# Patient Record
Sex: MALE | Race: WHITE | NOT HISPANIC OR LATINO
[De-identification: names, ages, dates, MRNs, and addresses within clinical notes are randomized per-mention and may not be internally consistent; named-entity substitution may affect disease eponyms.]

---

## 2017-03-20 ENCOUNTER — APPOINTMENT (OUTPATIENT)
Dept: PULMONOLOGY | Facility: CLINIC | Age: 72
End: 2017-03-20

## 2017-03-20 VITALS
WEIGHT: 226 LBS | BODY MASS INDEX: 31.64 KG/M2 | SYSTOLIC BLOOD PRESSURE: 138 MMHG | HEART RATE: 98 BPM | DIASTOLIC BLOOD PRESSURE: 78 MMHG | OXYGEN SATURATION: 97 % | HEIGHT: 71 IN

## 2017-03-20 DIAGNOSIS — R05 COUGH: ICD-10-CM

## 2017-03-20 DIAGNOSIS — Z86.59 PERSONAL HISTORY OF OTHER MENTAL AND BEHAVIORAL DISORDERS: ICD-10-CM

## 2017-03-20 DIAGNOSIS — Z86.39 PERSONAL HISTORY OF OTHER ENDOCRINE, NUTRITIONAL AND METABOLIC DISEASE: ICD-10-CM

## 2017-03-20 DIAGNOSIS — F25.9 SCHIZOAFFECTIVE DISORDER, UNSPECIFIED: ICD-10-CM

## 2017-03-20 DIAGNOSIS — I25.10 ATHEROSCLEROTIC HEART DISEASE OF NATIVE CORONARY ARTERY W/OUT ANGINA PECTORIS: ICD-10-CM

## 2017-03-20 DIAGNOSIS — I10 ESSENTIAL (PRIMARY) HYPERTENSION: ICD-10-CM

## 2017-03-20 DIAGNOSIS — G47.33 OBSTRUCTIVE SLEEP APNEA (ADULT) (PEDIATRIC): ICD-10-CM

## 2017-05-11 PROBLEM — I10 HTN (HYPERTENSION), BENIGN: Status: RESOLVED | Noted: 2017-05-11 | Resolved: 2017-05-11

## 2017-05-11 PROBLEM — F25.9 SCHIZOAFFECTIVE DISORDER: Status: RESOLVED | Noted: 2017-05-11 | Resolved: 2017-05-11

## 2017-05-11 PROBLEM — G47.33 SLEEP APNEA, OBSTRUCTIVE: Status: RESOLVED | Noted: 2017-05-11 | Resolved: 2017-05-11

## 2017-05-11 PROBLEM — Z86.39 HISTORY OF TYPE 2 DIABETES MELLITUS: Status: RESOLVED | Noted: 2017-05-11 | Resolved: 2017-05-11

## 2017-05-11 PROBLEM — I25.10 CAD (CORONARY ARTERY DISEASE): Status: RESOLVED | Noted: 2017-05-11 | Resolved: 2017-05-11

## 2017-05-11 PROBLEM — Z86.59 HISTORY OF DEPRESSION: Status: RESOLVED | Noted: 2017-05-11 | Resolved: 2017-05-11

## 2017-05-11 PROBLEM — Z86.39 HISTORY OF HYPERCHOLESTEROLEMIA: Status: RESOLVED | Noted: 2017-05-11 | Resolved: 2017-05-11

## 2017-05-11 RX ORDER — AMLODIPINE BESYLATE 5 MG/1
5 TABLET ORAL
Qty: 30 | Refills: 0 | Status: ACTIVE | COMMUNITY
Start: 2016-10-17

## 2017-05-11 RX ORDER — LEVOFLOXACIN 500 MG/1
500 TABLET, FILM COATED ORAL
Qty: 10 | Refills: 0 | Status: DISCONTINUED | COMMUNITY
Start: 2017-01-20

## 2017-05-11 RX ORDER — PREDNISOLONE ACETATE 10 MG/ML
1 SUSPENSION/ DROPS OPHTHALMIC
Qty: 5 | Refills: 0 | Status: DISCONTINUED | COMMUNITY
Start: 2016-10-04

## 2017-05-11 RX ORDER — CARVEDILOL 25 MG/1
25 TABLET, FILM COATED ORAL
Qty: 60 | Refills: 0 | Status: ACTIVE | COMMUNITY
Start: 2016-10-05

## 2017-05-11 RX ORDER — LIDOCAINE 5 G/100G
5 OINTMENT TOPICAL
Qty: 50 | Refills: 0 | Status: COMPLETED | COMMUNITY
Start: 2017-02-18

## 2017-05-11 RX ORDER — AZITHROMYCIN 250 MG/1
250 TABLET, FILM COATED ORAL
Qty: 6 | Refills: 0 | Status: COMPLETED | COMMUNITY
Start: 2016-11-03

## 2017-05-11 RX ORDER — METFORMIN HYDROCHLORIDE 1000 MG/1
1000 TABLET, COATED ORAL
Qty: 60 | Refills: 0 | Status: ACTIVE | COMMUNITY
Start: 2016-10-17

## 2017-05-11 RX ORDER — MECLIZINE HYDROCHLORIDE 25 MG/1
25 TABLET ORAL
Qty: 90 | Refills: 0 | Status: DISCONTINUED | COMMUNITY
Start: 2016-10-24

## 2017-05-11 RX ORDER — SERTRALINE HYDROCHLORIDE 100 MG/1
100 TABLET, FILM COATED ORAL
Qty: 30 | Refills: 0 | Status: ACTIVE | COMMUNITY
Start: 2016-09-20

## 2017-05-11 RX ORDER — TRAMADOL HYDROCHLORIDE 50 MG/1
50 TABLET, COATED ORAL
Qty: 120 | Refills: 0 | Status: DISCONTINUED | COMMUNITY
Start: 2016-10-18

## 2017-06-26 ENCOUNTER — APPOINTMENT (OUTPATIENT)
Dept: PULMONOLOGY | Facility: CLINIC | Age: 72
End: 2017-06-26

## 2018-09-24 ENCOUNTER — APPOINTMENT (OUTPATIENT)
Dept: PULMONOLOGY | Facility: CLINIC | Age: 73
End: 2018-09-24
Payer: MEDICARE

## 2018-09-24 VITALS
HEART RATE: 84 BPM | SYSTOLIC BLOOD PRESSURE: 119 MMHG | RESPIRATION RATE: 18 BRPM | BODY MASS INDEX: 30.8 KG/M2 | HEIGHT: 71 IN | DIASTOLIC BLOOD PRESSURE: 70 MMHG | OXYGEN SATURATION: 96 % | WEIGHT: 220 LBS

## 2018-09-24 DIAGNOSIS — G47.30 SLEEP APNEA, UNSPECIFIED: ICD-10-CM

## 2018-09-24 DIAGNOSIS — R53.83 OTHER FATIGUE: ICD-10-CM

## 2018-09-24 PROCEDURE — 94727 GAS DIL/WSHOT DETER LNG VOL: CPT

## 2018-09-24 PROCEDURE — 94729 DIFFUSING CAPACITY: CPT

## 2018-09-24 PROCEDURE — 94060 EVALUATION OF WHEEZING: CPT

## 2018-09-24 PROCEDURE — 99214 OFFICE O/P EST MOD 30 MIN: CPT | Mod: 25

## 2018-09-24 RX ORDER — FLUTICASONE PROPIONATE AND SALMETEROL 50; 250 UG/1; UG/1
250-50 POWDER RESPIRATORY (INHALATION)
Qty: 3 | Refills: 3 | Status: DISCONTINUED | COMMUNITY
Start: 2017-03-20 | End: 2018-09-24

## 2018-09-24 RX ORDER — FLUOXETINE HYDROCHLORIDE 40 MG/1
40 CAPSULE ORAL
Qty: 90 | Refills: 0 | Status: ACTIVE | COMMUNITY
Start: 2018-06-29

## 2018-09-24 RX ORDER — ASPIRIN 81 MG
81 TABLET, DELAYED RELEASE (ENTERIC COATED) ORAL
Refills: 0 | Status: ACTIVE | COMMUNITY

## 2018-09-24 RX ORDER — METOPROLOL SUCCINATE 50 MG/1
50 TABLET, EXTENDED RELEASE ORAL
Qty: 30 | Refills: 0 | Status: ACTIVE | COMMUNITY
Start: 2018-04-18

## 2018-09-24 RX ORDER — VALSARTAN 160 MG/1
160 TABLET, COATED ORAL
Qty: 30 | Refills: 0 | Status: DISCONTINUED | COMMUNITY
Start: 2016-10-28 | End: 2018-09-24

## 2018-09-27 PROBLEM — G47.30 APNEA, SLEEP: Status: ACTIVE | Noted: 2018-09-27

## 2018-09-27 PROBLEM — R53.83 FATIGUE: Status: ACTIVE | Noted: 2018-09-27

## 2018-12-24 ENCOUNTER — APPOINTMENT (OUTPATIENT)
Dept: PULMONOLOGY | Facility: CLINIC | Age: 73
End: 2018-12-24

## 2019-09-27 ENCOUNTER — INPATIENT (INPATIENT)
Facility: HOSPITAL | Age: 74
LOS: 3 days | Discharge: HOME | DRG: 880 | End: 2019-10-01
Attending: STUDENT IN AN ORGANIZED HEALTH CARE EDUCATION/TRAINING PROGRAM | Admitting: STUDENT IN AN ORGANIZED HEALTH CARE EDUCATION/TRAINING PROGRAM
Payer: MEDICARE

## 2019-09-27 VITALS
HEIGHT: 73 IN | HEART RATE: 88 BPM | SYSTOLIC BLOOD PRESSURE: 176 MMHG | OXYGEN SATURATION: 100 % | WEIGHT: 199.96 LBS | RESPIRATION RATE: 16 BRPM | DIASTOLIC BLOOD PRESSURE: 101 MMHG | TEMPERATURE: 98 F

## 2019-09-27 DIAGNOSIS — I25.10 ATHEROSCLEROTIC HEART DISEASE OF NATIVE CORONARY ARTERY WITHOUT ANGINA PECTORIS: ICD-10-CM

## 2019-09-27 DIAGNOSIS — R46.89 OTHER SYMPTOMS AND SIGNS INVOLVING APPEARANCE AND BEHAVIOR: ICD-10-CM

## 2019-09-27 DIAGNOSIS — R63.8 OTHER SYMPTOMS AND SIGNS CONCERNING FOOD AND FLUID INTAKE: ICD-10-CM

## 2019-09-27 DIAGNOSIS — R53.1 WEAKNESS: ICD-10-CM

## 2019-09-27 DIAGNOSIS — G62.9 POLYNEUROPATHY, UNSPECIFIED: ICD-10-CM

## 2019-09-27 DIAGNOSIS — I10 ESSENTIAL (PRIMARY) HYPERTENSION: ICD-10-CM

## 2019-09-27 DIAGNOSIS — C91.90 LYMPHOID LEUKEMIA, UNSPECIFIED NOT HAVING ACHIEVED REMISSION: ICD-10-CM

## 2019-09-27 DIAGNOSIS — E78.5 HYPERLIPIDEMIA, UNSPECIFIED: ICD-10-CM

## 2019-09-27 DIAGNOSIS — Z95.1 PRESENCE OF AORTOCORONARY BYPASS GRAFT: Chronic | ICD-10-CM

## 2019-09-27 DIAGNOSIS — Z91.89 OTHER SPECIFIED PERSONAL RISK FACTORS, NOT ELSEWHERE CLASSIFIED: ICD-10-CM

## 2019-09-27 DIAGNOSIS — E11.9 TYPE 2 DIABETES MELLITUS WITHOUT COMPLICATIONS: ICD-10-CM

## 2019-09-27 LAB
ALBUMIN SERPL ELPH-MCNC: 4.1 G/DL — SIGNIFICANT CHANGE UP (ref 3.3–5)
ALP SERPL-CCNC: 93 U/L — SIGNIFICANT CHANGE UP (ref 40–120)
ALT FLD-CCNC: 12 U/L — SIGNIFICANT CHANGE UP (ref 10–45)
ANION GAP SERPL CALC-SCNC: 11 MMOL/L — SIGNIFICANT CHANGE UP (ref 5–17)
ANISOCYTOSIS BLD QL: SIGNIFICANT CHANGE UP
AST SERPL-CCNC: 14 U/L — SIGNIFICANT CHANGE UP (ref 10–40)
BASOPHILS # BLD AUTO: 0.08 K/UL — SIGNIFICANT CHANGE UP (ref 0–0.2)
BASOPHILS NFR BLD AUTO: 0.7 % — SIGNIFICANT CHANGE UP (ref 0–2)
BILIRUB SERPL-MCNC: 0.7 MG/DL — SIGNIFICANT CHANGE UP (ref 0.2–1.2)
BUN SERPL-MCNC: 12 MG/DL — SIGNIFICANT CHANGE UP (ref 7–23)
CALCIUM SERPL-MCNC: 9.3 MG/DL — SIGNIFICANT CHANGE UP (ref 8.4–10.5)
CHLORIDE SERPL-SCNC: 105 MMOL/L — SIGNIFICANT CHANGE UP (ref 96–108)
CO2 SERPL-SCNC: 25 MMOL/L — SIGNIFICANT CHANGE UP (ref 22–31)
CREAT SERPL-MCNC: 0.67 MG/DL — SIGNIFICANT CHANGE UP (ref 0.5–1.3)
EOSINOPHIL # BLD AUTO: 0.1 K/UL — SIGNIFICANT CHANGE UP (ref 0–0.5)
EOSINOPHIL NFR BLD AUTO: 0.9 % — SIGNIFICANT CHANGE UP (ref 0–6)
ETHANOL SERPL-MCNC: <10 MG/DL — SIGNIFICANT CHANGE UP (ref 0–10)
GLUCOSE SERPL-MCNC: 213 MG/DL — HIGH (ref 70–99)
HCT VFR BLD CALC: 48.3 % — SIGNIFICANT CHANGE UP (ref 39–50)
HGB BLD-MCNC: 15 G/DL — SIGNIFICANT CHANGE UP (ref 13–17)
IMM GRANULOCYTES NFR BLD AUTO: 0.6 % — SIGNIFICANT CHANGE UP (ref 0–1.5)
LG PLATELETS BLD QL AUTO: SLIGHT — SIGNIFICANT CHANGE UP
LYMPHOCYTES # BLD AUTO: 2.16 K/UL — SIGNIFICANT CHANGE UP (ref 1–3.3)
LYMPHOCYTES # BLD AUTO: 20 % — SIGNIFICANT CHANGE UP (ref 13–44)
MACROCYTES BLD QL: SLIGHT — SIGNIFICANT CHANGE UP
MANUAL SMEAR VERIFICATION: SIGNIFICANT CHANGE UP
MCHC RBC-ENTMCNC: 24.8 PG — LOW (ref 27–34)
MCHC RBC-ENTMCNC: 31.1 GM/DL — LOW (ref 32–36)
MCV RBC AUTO: 80 FL — SIGNIFICANT CHANGE UP (ref 80–100)
MICROCYTES BLD QL: SIGNIFICANT CHANGE UP
MONOCYTES # BLD AUTO: 0.75 K/UL — SIGNIFICANT CHANGE UP (ref 0–0.9)
MONOCYTES NFR BLD AUTO: 6.9 % — SIGNIFICANT CHANGE UP (ref 2–14)
NEUTROPHILS # BLD AUTO: 7.64 K/UL — HIGH (ref 1.8–7.4)
NEUTROPHILS NFR BLD AUTO: 70.9 % — SIGNIFICANT CHANGE UP (ref 43–77)
NRBC # BLD: 0 /100 WBCS — SIGNIFICANT CHANGE UP (ref 0–0)
OVALOCYTES BLD QL SMEAR: SLIGHT — SIGNIFICANT CHANGE UP
PLAT MORPH BLD: ABNORMAL
PLATELET # BLD AUTO: 228 K/UL — SIGNIFICANT CHANGE UP (ref 150–400)
POIKILOCYTOSIS BLD QL AUTO: SLIGHT — SIGNIFICANT CHANGE UP
POLYCHROMASIA BLD QL SMEAR: SLIGHT — SIGNIFICANT CHANGE UP
POTASSIUM SERPL-MCNC: 4.1 MMOL/L — SIGNIFICANT CHANGE UP (ref 3.5–5.3)
POTASSIUM SERPL-SCNC: 4.1 MMOL/L — SIGNIFICANT CHANGE UP (ref 3.5–5.3)
PROT SERPL-MCNC: 7.3 G/DL — SIGNIFICANT CHANGE UP (ref 6–8.3)
RBC # BLD: 6.04 M/UL — HIGH (ref 4.2–5.8)
RBC # FLD: 22.1 % — HIGH (ref 10.3–14.5)
RBC BLD AUTO: ABNORMAL
SODIUM SERPL-SCNC: 141 MMOL/L — SIGNIFICANT CHANGE UP (ref 135–145)
SPHEROCYTES BLD QL SMEAR: SLIGHT — SIGNIFICANT CHANGE UP
TSH SERPL-MCNC: 1.25 UIU/ML — SIGNIFICANT CHANGE UP (ref 0.35–4.94)
WBC # BLD: 10.8 K/UL — HIGH (ref 3.8–10.5)
WBC # FLD AUTO: 10.8 K/UL — HIGH (ref 3.8–10.5)

## 2019-09-27 PROCEDURE — 99222 1ST HOSP IP/OBS MODERATE 55: CPT | Mod: GC

## 2019-09-27 PROCEDURE — 93010 ELECTROCARDIOGRAM REPORT: CPT

## 2019-09-27 PROCEDURE — 90792 PSYCH DIAG EVAL W/MED SRVCS: CPT

## 2019-09-27 PROCEDURE — 99285 EMERGENCY DEPT VISIT HI MDM: CPT

## 2019-09-27 RX ORDER — DEXTROSE 50 % IN WATER 50 %
25 SYRINGE (ML) INTRAVENOUS ONCE
Refills: 0 | Status: DISCONTINUED | OUTPATIENT
Start: 2019-09-27 | End: 2019-10-01

## 2019-09-27 RX ORDER — INSULIN LISPRO 100/ML
VIAL (ML) SUBCUTANEOUS
Refills: 0 | Status: DISCONTINUED | OUTPATIENT
Start: 2019-09-27 | End: 2019-10-01

## 2019-09-27 RX ORDER — ASPIRIN/CALCIUM CARB/MAGNESIUM 324 MG
81 TABLET ORAL DAILY
Refills: 0 | Status: DISCONTINUED | OUTPATIENT
Start: 2019-09-27 | End: 2019-10-01

## 2019-09-27 RX ORDER — GLUCAGON INJECTION, SOLUTION 0.5 MG/.1ML
1 INJECTION, SOLUTION SUBCUTANEOUS ONCE
Refills: 0 | Status: DISCONTINUED | OUTPATIENT
Start: 2019-09-27 | End: 2019-10-01

## 2019-09-27 RX ORDER — METOPROLOL TARTRATE 50 MG
100 TABLET ORAL DAILY
Refills: 0 | Status: DISCONTINUED | OUTPATIENT
Start: 2019-09-28 | End: 2019-10-01

## 2019-09-27 RX ORDER — CLOPIDOGREL BISULFATE 75 MG/1
75 TABLET, FILM COATED ORAL DAILY
Refills: 0 | Status: DISCONTINUED | OUTPATIENT
Start: 2019-09-27 | End: 2019-10-01

## 2019-09-27 RX ORDER — DEXTROSE 50 % IN WATER 50 %
12.5 SYRINGE (ML) INTRAVENOUS ONCE
Refills: 0 | Status: DISCONTINUED | OUTPATIENT
Start: 2019-09-27 | End: 2019-10-01

## 2019-09-27 RX ORDER — ATORVASTATIN CALCIUM 80 MG/1
80 TABLET, FILM COATED ORAL AT BEDTIME
Refills: 0 | Status: DISCONTINUED | OUTPATIENT
Start: 2019-09-27 | End: 2019-10-01

## 2019-09-27 RX ORDER — AMLODIPINE BESYLATE 2.5 MG/1
5 TABLET ORAL DAILY
Refills: 0 | Status: DISCONTINUED | OUTPATIENT
Start: 2019-09-27 | End: 2019-10-01

## 2019-09-27 RX ORDER — QUETIAPINE FUMARATE 200 MG/1
25 TABLET, FILM COATED ORAL ONCE
Refills: 0 | Status: COMPLETED | OUTPATIENT
Start: 2019-09-27 | End: 2019-09-27

## 2019-09-27 RX ORDER — SODIUM CHLORIDE 9 MG/ML
1000 INJECTION, SOLUTION INTRAVENOUS
Refills: 0 | Status: DISCONTINUED | OUTPATIENT
Start: 2019-09-27 | End: 2019-10-01

## 2019-09-27 RX ORDER — DEXTROSE 50 % IN WATER 50 %
15 SYRINGE (ML) INTRAVENOUS ONCE
Refills: 0 | Status: DISCONTINUED | OUTPATIENT
Start: 2019-09-27 | End: 2019-10-01

## 2019-09-27 RX ORDER — QUETIAPINE FUMARATE 200 MG/1
50 TABLET, FILM COATED ORAL DAILY
Refills: 0 | Status: DISCONTINUED | OUTPATIENT
Start: 2019-09-28 | End: 2019-09-28

## 2019-09-27 RX ORDER — QUETIAPINE FUMARATE 200 MG/1
50 TABLET, FILM COATED ORAL ONCE
Refills: 0 | Status: COMPLETED | OUTPATIENT
Start: 2019-09-27 | End: 2019-09-27

## 2019-09-27 RX ORDER — GABAPENTIN 400 MG/1
600 CAPSULE ORAL THREE TIMES A DAY
Refills: 0 | Status: DISCONTINUED | OUTPATIENT
Start: 2019-09-27 | End: 2019-10-01

## 2019-09-27 RX ORDER — ACETAMINOPHEN 500 MG
650 TABLET ORAL ONCE
Refills: 0 | Status: COMPLETED | OUTPATIENT
Start: 2019-09-27 | End: 2019-09-27

## 2019-09-27 RX ADMIN — GABAPENTIN 600 MILLIGRAM(S): 400 CAPSULE ORAL at 22:53

## 2019-09-27 RX ADMIN — QUETIAPINE FUMARATE 25 MILLIGRAM(S): 200 TABLET, FILM COATED ORAL at 20:41

## 2019-09-27 RX ADMIN — Medication 650 MILLIGRAM(S): at 11:45

## 2019-09-27 RX ADMIN — Medication 4: at 22:52

## 2019-09-27 RX ADMIN — Medication 650 MILLIGRAM(S): at 13:08

## 2019-09-27 RX ADMIN — QUETIAPINE FUMARATE 50 MILLIGRAM(S): 200 TABLET, FILM COATED ORAL at 11:45

## 2019-09-27 RX ADMIN — Medication 81 MILLIGRAM(S): at 17:52

## 2019-09-27 RX ADMIN — CLOPIDOGREL BISULFATE 75 MILLIGRAM(S): 75 TABLET, FILM COATED ORAL at 17:53

## 2019-09-27 RX ADMIN — ATORVASTATIN CALCIUM 80 MILLIGRAM(S): 80 TABLET, FILM COATED ORAL at 22:53

## 2019-09-27 NOTE — ED ADULT TRIAGE NOTE - CHIEF COMPLAINT QUOTE
Patient presents to the ED with c/o SI, states he will miss chemo therapy on purpose and has already "stopped eating." Also states " I live alone, no one takes care of me, I need to go to a nursing home, because I am going to die." Speaking in complete sentences without difficulty. NAD Noted

## 2019-09-27 NOTE — PHYSICAL THERAPY INITIAL EVALUATION ADULT - FOLLOWS COMMANDS/ANSWERS QUESTIONS, REHAB EVAL
able to follow single-step instructions/100% of the time/patient has difficulty with multi step commands, requiring VCing for safety/sequencing and repetition for task

## 2019-09-27 NOTE — OCCUPATIONAL THERAPY INITIAL EVALUATION ADULT - LEVEL OF INDEPENDENCE: DRESS LOWER BODY, OT EVAL
maximum assist (25% patients effort)/seated on EOB to attempt to doff socks, patient noted throwing self back to bed, however able to self correct balance. Patient catastrophizing, stating he just cannot do LB dressing.

## 2019-09-27 NOTE — H&P ADULT - NSHPLABSRESULTS_GEN_ALL_CORE
OVERNIGHT EVENTS:    SUBJECTIVE / INTERVAL HPI: Patient seen and examined at bedside.     VITAL SIGNS:  Vital Signs Last 24 Hrs  T(C): 36.6 (27 Sep 2019 12:47), Max: 36.8 (27 Sep 2019 08:30)  T(F): 97.9 (27 Sep 2019 12:47), Max: 98.2 (27 Sep 2019 08:30)  HR: 80 (27 Sep 2019 12:47) (79 - 88)  BP: 179/85 (27 Sep 2019 12:47) (133/77 - 179/85)  BP(mean): --  RR: 18 (27 Sep 2019 12:47) (16 - 18)  SpO2: 98% (27 Sep 2019 12:47) (97% - 100%)    LABS:                        15.0   10.80 )-----------( 228      ( 27 Sep 2019 08:16 )             48.3     09-27    141  |  105  |  12  ----------------------------<  213<H>  4.1   |  25  |  0.67    Ca    9.3      27 Sep 2019 08:16    TPro  7.3  /  Alb  4.1  /  TBili  0.7  /  DBili  x   /  AST  14  /  ALT  12  /  AlkPhos  93  09-27        CAPILLARY BLOOD GLUCOSE          RADIOLOGY & ADDITIONAL TESTS: Reviewed.

## 2019-09-27 NOTE — ED ADULT NURSE NOTE - OBJECTIVE STATEMENT
Pt states he does not want to go back where he lives. He lies in bed and does not eat, or takes his medications. Pt states he has been having suicidal thoughts with no specific plan for "couple months now." Hearing voices, forgot what voices told him. No HI. Pt states "I want to be with people that I can interact with."

## 2019-09-27 NOTE — PHYSICAL THERAPY INITIAL EVALUATION ADULT - LEVEL OF INDEPENDENCE: GAIT, REHAB EVAL
minx 1 with SC 5 ft bedside with ncrease swaying and other UE in mid guard position ready seeking for support. Amb 30 ft with RW CG-Min x 1.

## 2019-09-27 NOTE — PHYSICAL THERAPY INITIAL EVALUATION ADULT - ADDITIONAL COMMENTS
Pt lives at home alone in what appears t be a senior living type of facility though SUZIE Rios and CM Artie aware of patient report. Pt has elevator access, denies LICHA. PTA: pt states he has had weight loss (unclear #lbs) 2/2 unable to care for himself and has no help with meal prep. Pt reports amb with SC though had multiple falls (reports 3 falls though unclear time frames). States he did not want to use a RW previously. Pt reprots wearing eyeglasses corrective. States has neuropathy bilat LE below knee and finger tips

## 2019-09-27 NOTE — ED BEHAVIORAL HEALTH ASSESSMENT NOTE - PAST PSYCHOTROPIC MEDICATION
As of 2016 note pt was on   Ativan 0.5 mg po prn anxiety  Abilify 30 mg po qdaily  Trilafon 4 mg po qAM (Of note: Pt has been on higher doses in the past i.e. 4 mg po qdaily and 8 mg po qhs.)

## 2019-09-27 NOTE — H&P ADULT - PROBLEM SELECTOR PLAN 3
- Pt w/ CLL reportedly dx in 2012. Pt states he follows with Dr. Gallo, oncologist who started in him on Rituxan last week.   - Spoke with Dr. Woodson who is covering for Dr. Gallo who reports that pt is due for his next infusion of Rituxan on 10/1 and does not require inpatient therapy. Also, notes that there were plans to start pt on ibrutinib 420 mg but it is not clear if pt has started it yet

## 2019-09-27 NOTE — H&P ADULT - NSICDXPASTMEDICALHX_GEN_ALL_CORE_FT
PAST MEDICAL HISTORY:  Chronic leukemia in remission     DM (diabetes mellitus) Type 2    Hypertension     Sleep apnea, unspecified type

## 2019-09-27 NOTE — OCCUPATIONAL THERAPY INITIAL EVALUATION ADULT - BATHING, PREVIOUS LEVEL OF FUNCTION, OT EVAL
Called patient and spoke with patient wife whom is on HIPPA and informed she verbalized understanding shower chair/needs device

## 2019-09-27 NOTE — PHYSICAL THERAPY INITIAL EVALUATION ADULT - IMPAIRMENTS CONTRIBUTING TO GAIT DEVIATIONS, PT EVAL
decreased sensation/+fall risk, pt requires assist with initiating task./decreased strength/impaired balance/cognition

## 2019-09-27 NOTE — OCCUPATIONAL THERAPY INITIAL EVALUATION ADULT - GENERAL OBSERVATIONS, REHAB EVAL
R hand dominant, patient cleared for OT by SATNAM Mcnair. Patient received semi-supine, +1:1 supervision, +heplock.

## 2019-09-27 NOTE — PHYSICAL THERAPY INITIAL EVALUATION ADULT - GENERAL OBSERVATIONS, REHAB EVAL
Rec'd pt supine in bed, in no acute distress, though expressed feeling anxious and that is why he is talk loud, pt denies POOJA REID and Blanca LARSEN/MARGIE lara

## 2019-09-27 NOTE — ED BEHAVIORAL HEALTH ASSESSMENT NOTE - OTHER PAST PSYCHIATRIC HISTORY (INCLUDE DETAILS REGARDING ONSET, COURSE OF ILLNESS, INPATIENT/OUTPATIENT TREATMENT)
Multiple prior inpt psychiatric admissions since he was in his 20's.  Last inpt admits ([facility]): 11/18/13-12/3/13; 10/30/14-11/6/14.  Last outpt tx/rx: no recent treatment   No hx of SI/SA, HI/HA, drugs/EtOH or cigarettes.

## 2019-09-27 NOTE — OCCUPATIONAL THERAPY INITIAL EVALUATION ADULT - MD ORDER
Per chart, Per chart, 75 y/o M w/ pmhx of CLL (receiving rituxan started about 1 week ago next tx due 10/1), schizoaffective disorder?, HTN, DM, CAD s/p CABG in 2004 and 2 stents placed 6 months ago, HLD, peripheral neuropathy presenting with complaints feelings of increased depression and states he is unable to take care of himself anymore. Pt reports he has been feeling lonely, not wanting to get out of bed and has had decreased appetite stating he has lost 20 lbs.

## 2019-09-27 NOTE — PHYSICAL THERAPY INITIAL EVALUATION ADULT - MODALITIES TREATMENT COMMENTS
smile symmetrical, eyes open/close intact and raise eyebrows intact. Vision testing deferred by pt. smile symmetrical, eyes open/close intact and raise eyebrows intact. Vision testing deferred by pt. LTG (4-7 days): 1) increase bed mobility to CG, 2) increase transfers to CG. 3) increase amb with approp AD 50 ft with CG.

## 2019-09-27 NOTE — ED BEHAVIORAL HEALTH ASSESSMENT NOTE - OTHER
focused on inability to care for himself. pt does not appear internally preoccupied. Possibly over endorsing symptoms to gain admission No current outpt psychiatric provider unable to assess On stretcher somewhat childlike Dramatic

## 2019-09-27 NOTE — H&P ADULT - HISTORY OF PRESENT ILLNESS
Pt is a 75 y/o M w/ pmhx of CLL (receiving rituxan started about 1 week ago next tx due 10/1), schizoaffective disorder?, HTN, DM, CAD s/p CABG in 2004 and 2 stents placed 6 months ago, HLD, peripheral neuropathy presenting with complaints feelings of increased depression and states he is unable to take care of himself anymore. Pt reports he has been feeling lonely, not wanting to get out of bed and has had decreased appetite stating he has lost 20 lbs. He states he does not want to go back to his home because there is no one there to help take care of him. He reports he has no family or friends and is requesting to be placed in an assisted living facility. At baseline, he states he uses a cane to get around, but has been having more difficulty getting around. He denies any chest pain, SOB, fever, chills, abd pain, N/V/D. He does mention he has some chronic left shoulder pain that's due to a rotator cuff tear years ago.    In ED, Pt is a 75 y/o M w/ pmhx of CLL (receiving rituxan started about 1 week ago next tx due 10/1), schizoaffective disorder?, HTN, DM, CAD s/p CABG in 2004 and 2 stents placed 6 months ago, HLD, peripheral neuropathy presenting with complaints feelings of increased depression and states he is unable to take care of himself anymore. Pt reports he has been feeling lonely, not wanting to get out of bed and has had decreased appetite stating he has lost 20 lbs. He states he does not want to go back to his home because there is no one there to help take care of him. He reports he has no family or friends and is requesting to be placed in an assisted living facility. At baseline, he states he uses a cane to get around, but has been having more difficulty getting around. He denies any chest pain, SOB, fever, chills, abd pain, N/V/D. He does mention he has some chronic left shoulder pain that's due to a rotator cuff tear years ago.    In ED, T 98.2, HR 79, /98, RR 18, O2 100% on RA. Labs wbc 10.8, EKG NSR w/ T wave abnormalities in Pt is a 75 y/o M w/ pmhx of CLL (receiving rituxan started about 1 week ago next tx due 10/1), schizoaffective disorder?, HTN, DM, CAD s/p CABG in 2004 and 2 stents placed 6 months ago, HLD, peripheral neuropathy presenting with complaints feelings of increased depression and states he is unable to take care of himself anymore. Pt reports he has been feeling lonely, not wanting to get out of bed and has had decreased appetite stating he has lost 20 lbs. Pt had called EMS to bring him into the hospital. He states he does not want to go back to his home because there is no one there to help take care of him. He reports he has no family or friends and is requesting to be placed in an assisted living facility or nursing home. At baseline, he states he uses a cane to get around, but has been having more difficulty getting around. He denies any chest pain, SOB, fever, chills, abd pain, N/V/D. He does mention he has some chronic left shoulder pain that's due to a rotator cuff tear years ago.     In ED, T 98.2, HR 79, /98, RR 18, O2 100% on RA. Labs wbc 10.8, EKG NSR w/ T wave abnormalities in lateral leads. Pt assessed by psych and given Seroquel 50 mg.

## 2019-09-27 NOTE — PHYSICAL THERAPY INITIAL EVALUATION ADULT - GROSSLY INTACT, SENSORY
except numbness finger tips bilat UE and below feet decreae sensaton and plantar of feet. states not new h/o neuropathy and c/o falls Jaxon PA aware./Grossly Intact/Left LE/Right LE/Right UE/Left UE

## 2019-09-27 NOTE — OCCUPATIONAL THERAPY INITIAL EVALUATION ADULT - VISUAL ASSESSMENT: TRACKING
Despite patient reports of being unable to see the therapist, patient able to complete visual tracking in all planes w/ no nystagmus noted .

## 2019-09-27 NOTE — OCCUPATIONAL THERAPY INITIAL EVALUATION ADULT - STANDING BALANCE: DYNAMIC, REHAB EVAL
Patient ambulated approximately 15 ft w/ SC for support. No LOB noted. Patient agitated throughout stating he is unable to complete tasks, and is "horrible" at walking./fair balance

## 2019-09-27 NOTE — ED PROVIDER NOTE - OBJECTIVE STATEMENT
73 y/o male with hx of DM, HTN, CLL, SUSY, schizoaffective d/o c/o depression with SI. pt states he has been feeling this way for a while. pt notes he does not like his current living situation and feels all alone. Pt admits to hearing voices but states he does not know what they are saying. pt requesting admission or placement in nursing home. Pt notes decrease appetite and insomnia. pt reports lost 20lbs but does not known over how long of a period. no ha or dizziness. no cp, sob, abd pain, n/v/d. no further complaints. Pt states last chemo for CLL was either monday or tuesday.

## 2019-09-27 NOTE — ED PROVIDER NOTE - CLINICAL SUMMARY MEDICAL DECISION MAKING FREE TEXT BOX
depression and SI and requesting placement in nursing facility. VSS. ecg no ischemic changes, labs noted. pt evaluated in ED by psych. given pt currently being tx for CLL and ? gait issues requesting medicine admission. pt evaluated in ED by PT and recommend AURY. pt evaluated by  and case mgt. will admit to medicine

## 2019-09-27 NOTE — ED ADULT NURSE NOTE - NSIMPLEMENTINTERV_GEN_ALL_ED
Implemented All Fall with Harm Risk Interventions:  [place] to call system. Call bell, personal items and telephone within reach. Instruct patient to call for assistance. Room bathroom lighting operational. Non-slip footwear when patient is off stretcher. Physically safe environment: no spills, clutter or unnecessary equipment. Stretcher in lowest position, wheels locked, appropriate side rails in place. Provide visual cue, wrist band, yellow gown, etc. Monitor gait and stability. Monitor for mental status changes and reorient to person, place, and time. Review medications for side effects contributing to fall risk. Reinforce activity limits and safety measures with patient and family. Provide visual clues: red socks.

## 2019-09-27 NOTE — H&P ADULT - ASSESSMENT
Pt is a 73 y/o M w/ pmhx of CLL (receiving rituxan started about 1 week ago next tx due 10/1), schizoaffective disorder?, HTN, DM, CAD s/p CABG in 2004 and 2 stents placed 6 months ago, HLD, peripheral neuropathy presenting with complaints feelings of increased depression and states he is unable to take care of himself anymore. Pt admitted for AURY placement.

## 2019-09-27 NOTE — ED BEHAVIORAL HEALTH ASSESSMENT NOTE - RISK ASSESSMENT
Pt with no history of suicidal ideation or attempts, has no firearms at home, thus is at low risk for self harm if discharged.

## 2019-09-27 NOTE — ED BEHAVIORAL HEALTH ASSESSMENT NOTE - SUMMARY
Pt is a 75yo SWM, domiciled alone with dx of Chronic Schizophrenia (vs Schizoaffective D/O?), multiple prior inpt psych admits incl [facility] (none since 2014), not currently in outpatient psychiatric treatment, hx/o SUSY, CAD s/p CABG, Type 2 DM, HTN, diabetic neuropathy, obesity, spinal stenosis, CLL (undergoing chemo therapy with Dr. Gallo [phone]), was BIBEMS after pt called 911 asking to be taken to the hospital. Pt is focused on inability to care for himself, requesting NH placement. He is possibly over endorsing psychiatric symptoms to gain admission. Pt reports ongoing depression, passive SI, and AH. Pt does not appear internally preoccupied. He is agreeable to restart Seroquel. Pt presents with primary concern of seeking placement.  psychiatry will continue to follow pt while he is admitted to medicine.   Currently he contracts for safety.

## 2019-09-27 NOTE — OCCUPATIONAL THERAPY INITIAL EVALUATION ADULT - GROSSLY INTACT, SENSORY
Patient reports history of neuropathy in BLE, radiating throughout LE. patient reports "pins and needles" throughout BUE.

## 2019-09-27 NOTE — OCCUPATIONAL THERAPY INITIAL EVALUATION ADULT - MANUAL MUSCLE TESTING RESULTS, REHAB EVAL
Patient unable to maintain BUE shoulder flexion/extension, however based on functional observation patient BUE shoulder flexion >3+/5. BUE elbows 5/5,  >3/5 patient noted w/ decreased effort. Patient refusing to smile, or complete cheek puff, however patient tongue protrusion in midline and eyebrow elevation grossly intact.

## 2019-09-27 NOTE — H&P ADULT - NSHPPHYSICALEXAM_GEN_ALL_CORE
VITAL SIGNS:  T(C): 36.6 (09-27-19 @ 12:47), Max: 36.8 (09-27-19 @ 08:30)  T(F): 97.9 (09-27-19 @ 12:47), Max: 98.2 (09-27-19 @ 08:30)  HR: 80 (09-27-19 @ 12:47) (79 - 88)  BP: 179/85 (09-27-19 @ 12:47) (133/77 - 179/85)  BP(mean): --  RR: 18 (09-27-19 @ 12:47) (16 - 18)  SpO2: 98% (09-27-19 @ 12:47) (97% - 100%)  Wt(kg): --    PHYSICAL EXAM:    Constitutional: WDWN resting comfortably in bed; NAD  Head: NC/AT  Eyes: PERRL, EOMI, anicteric sclera  ENT: no nasal discharge; uvula midline, no oropharyngeal erythema or exudates; MMM  Neck: supple; no JVD or thyromegaly  Respiratory: CTA B/L; no W/R/R, no retractions  Cardiac: +S1/S2; RRR; no M/R/G; PMI non-displaced  Gastrointestinal: abdomen soft, NT/ND; no rebound or guarding; +BSx4  Back: spine midline, no bony tenderness or step-offs; no CVAT B/L  Extremities: WWP, no clubbing or cyanosis; no peripheral edema  Musculoskeletal: NROM x4; no joint swelling, tenderness or erythema  Vascular: 2+ radial, DP/PT pulses B/L  Dermatologic: skin warm, dry and intact; no rashes, wounds. Multiple scars on b/l LE.  Lymphatic: no submandibular or cervical LAD  Neurologic: AAOx3; CNII-XII grossly intact; no focal deficits  Psychiatric: affect and characteristics of appearance, verbalizations, behaviors are appropriate

## 2019-09-27 NOTE — H&P ADULT - PROBLEM SELECTOR PLAN 10
1) PCP Contacted on Admission: (Y/N) --> Name & Phone #: Dr. Gallo, oncologist  2) Date of Contact with PCP: 9/27/19  3) PCP Contacted at Discharge: (Y/N, N/A)  4) Summary of Handoff Given to PCP:   5) Post-Discharge Appointment Date and Location:

## 2019-09-27 NOTE — ED ADULT NURSE REASSESSMENT NOTE - NS ED NURSE REASSESS COMMENT FT1
Pt remains on constant observation. Pt currently calm and cooperative but repeatedly asks to be admitted or be placed in a nursing home or assisted living facility.
Patient awaiting bed at this time, denies medical complaints. Yelling out that he wants his clothes to go with him upstairs. Medication administered to assist with agitation. NAD noted.

## 2019-09-27 NOTE — OCCUPATIONAL THERAPY INITIAL EVALUATION ADULT - ADDITIONAL COMMENTS
Patient resistant to answer questions, reports not functioning well at this time, difficulty walking w/ SC. Patient reports owning a shower chair, however reports he does not shower and use the chair. Patient reports he has difficulty w/ all tasks and would like to move to assistive living at this time.

## 2019-09-27 NOTE — ED BEHAVIORAL HEALTH ASSESSMENT NOTE - DESCRIPTION
Never , no children. 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 Cooperative and in good behavioral control. Speaking in a loud voice. No agitation however SUSY, CLL, CAD s/p CABG, Type 2 DM, HTN, diabetic neuropathy, obesity, spinal stenosis

## 2019-09-27 NOTE — H&P ADULT - PROBLEM SELECTOR PLAN 8
F: none  E: replete prn  N: diabetic diet    PPx: F: none  E: replete prn  N: diabetic diet    PPx:  Dispo: RMF then AURY - on Crestor 40 mg QD

## 2019-09-27 NOTE — ED ADULT NURSE NOTE - ACTIVATING EVENTS/STRESSORS
Current or pending social isolation/Non-compliant or not receiving treatment/Hopeless about or dissatisfied with provider or treatment/Inadequate social supports

## 2019-09-27 NOTE — PATIENT PROFILE ADULT - VISION (WITH CORRECTIVE LENSES IF THE PATIENT USUALLY WEARS THEM):
Partially impaired: cannot see medication labels or newsprint, but can see obstacles in path, and the surrounding layout; can count fingers at arm's length/wears glasses,eyes watery,burning,itching

## 2019-09-27 NOTE — OCCUPATIONAL THERAPY INITIAL EVALUATION ADULT - VISUAL ACUITY
Patient reports his eyes are watery, extremely affecting his vision, states he has a hard time seeing the therapist. Patient wears glasses.

## 2019-09-27 NOTE — H&P ADULT - PROBLEM SELECTOR PLAN 6
- On Toprol  mg QD, norvasc 5 mg QD - Pt reports hx of b/l peripheral neuropathy  - on gabapentin 600 mg TID - Pt reports hx of b/l peripheral neuropathy likely 2/2 to diabetes  - on gabapentin 600 mg TID

## 2019-09-27 NOTE — H&P ADULT - PROBLEM SELECTOR PLAN 1
Pt had initially presented to hospital due to thoughts of hurting himself, fe Pt had initially presented to hospital due to thoughts of hurting himself, feelings of depression, not wanting to get out of bed or eat. - Pt w/ hx of schizoaffective disorder? had initially presented to hospital due to thoughts of hurting himself, feelings of depression, not wanting to get out of bed or eat  - He states he wants to be placed in an assisted living or nursing home  - f/u psych recs but most likely pt can follow up with [facility] outpatient after being placed in [facility]  - Pt on seroquel 200 mg BID - Pt w/ hx of schizoaffective disorder? had initially presented to hospital due to thoughts of hurting himself, feelings of depression, not wanting to get out of bed or eat. He called EMS to bring him to hospital  - He states he is unable to take care of himself, does not want to go back home and wants to be placed in an assisted living or nursing home  - f/u psych recs but most likely pt can follow up with [facility] outpatient after being placed in [facility]  - Pt on seroquel 200 mg BID - Pt w/ hx of schizoaffective disorder? had initially presented to hospital due to thoughts of hurting himself, feelings of depression, not wanting to get out of bed or eat. He called EMS to bring him to hospital  - He states he is unable to take care of himself, does not want to go back home and wants to be placed in an assisted living or nursing home  - f/u psych recs but most likely pt can follow up with [facility] outpatient after being placed in [facility]  - Pt on seroquel 200 mg BID at home. Will start on seroquel 50 mg here per psych

## 2019-09-27 NOTE — OCCUPATIONAL THERAPY INITIAL EVALUATION ADULT - NS ASR FOLLOW COMMAND OT EVAL
Patient resistant to follow commands, able to follow 1 step tasks however required encouragement throughout to participate in evaluation./able to follow single-step instructions/75% of the time

## 2019-09-27 NOTE — ED PROVIDER NOTE - PHYSICAL EXAMINATION
mild swelling to LUE with bruising present to bicep region. no bony tenderness. no erythema. FROM. distal NVI. mild swelling to LUE (pt reports chronic) with bruising present to bicep region. no bony tenderness. no erythema. FROM. distal NVI.

## 2019-09-27 NOTE — OCCUPATIONAL THERAPY INITIAL EVALUATION ADULT - SITTING BALANCE: DYNAMIC
poor plus/Patient w/ decreased balance to complete seated ADLs, noted throwing self back onto bed, however patient able to correct posture w/ cues and inconsistently demonstrating decreased dynamic seated balance.

## 2019-09-27 NOTE — H&P ADULT - PROBLEM SELECTOR PLAN 2
- Pt reports generalized weakness with complaints of not being able to take care of himself  - States it is hard for him to get around with a cane  - Likely due to overall deconditioning, CLL, no evidence of any infection, strength is good on exam.  - PT recommends AURY, appreciate OT recs.   - Plan for pt to go to AURY

## 2019-09-27 NOTE — ED BEHAVIORAL HEALTH ASSESSMENT NOTE - HPI (INCLUDE ILLNESS QUALITY, SEVERITY, DURATION, TIMING, CONTEXT, MODIFYING FACTORS, ASSOCIATED SIGNS AND SYMPTOMS)
Pt is a 73yo SWM, domiciled alone with dx of Chronic Schizophrenia (vs Schizoaffective D/O?), multiple prior inpt psych admits incl [facility]ris (none since 2014), not currently in outpatient psychiatric treatment, hx/o SUSY,  CAD s/p CABG, Type 2 DM, HTN, diabetic neuropathy, obesity, spinal stenosis, CLL (undergoing chemo therapy with Dr. Gallo [phone]), was BIBEMS after pt called 911 asking to be taken to the hospital. He spoke loudly and c/o of conditions in his living situation stating that "they don’t help him", he "can’t take care of himself", and he "needs to be in a nursing home". Pt did not want writer to contact the home because “then you will make me go back there.” Pt stated that he is depressed and hasn’t eaten in days, has difficulty sleeping, and is lonely. Pt stated he has been having suicidal thoughts but reported that he does not have a plan or intent. He denies prior SA's. Pt appears fearful of potential discharge and is possibly over endorsing psychiatric symptoms. Pt denies HI and past or current [place]. Pt stated “I am hearing voices” however he did not answer any follow up questions about the  and instead returned to c/o about his living situation and his need for care. Pt stated that because he has neuropathy he can’t get to the grocery store or to his medical appointments. Pt stated that he doesn’t have a psychiatrist and that his , Dr. Corbett, gives him medication. He stated he can’t remember what he medication he is on and that he does not take his medications regularly. Pt thought he might be on Seroquel and Klonopin but stated “I don’t remember. You’ll have to try things out and see what works.” Pt stated he needs help to feel “calm.”  Pt is receiving chemotherapy with Dr. Gallo [phone] (this was confirmed. Pt was last seen there for Ritaxin treatment and was scheduled to return on 10/1.   Pt is perseverative on the need to go to the nursing home. "doctor, I can not take care of myself, please admit me".

## 2019-09-27 NOTE — OCCUPATIONAL THERAPY INITIAL EVALUATION ADULT - PLANNED THERAPY INTERVENTIONS, OT EVAL
ADL retraining/balance training/bed mobility training/motor coordination training/strengthening/cognitive, visual perceptual/fine motor coordination training/neuromuscular re-education/ROM/transfer training

## 2019-09-27 NOTE — OCCUPATIONAL THERAPY INITIAL EVALUATION ADULT - FINE MOTOR COORDINATION EXAM
Patient BUE fine motor coordination intact, however patient reports he is unable to do anything at this time, demonstrates poor effort./Right UE/Left UE

## 2019-09-27 NOTE — ED PROVIDER NOTE - CARE PLAN
Principal Discharge DX:	Suicidal behavior Principal Discharge DX:	Suicidal behavior  Secondary Diagnosis:	Weakness

## 2019-09-27 NOTE — PHYSICAL THERAPY INITIAL EVALUATION ADULT - TRANSFER SAFETY CONCERNS NOTED: SIT/STAND, REHAB EVAL
decreased sequencing ability/with SC pt min x 1 requiring increased assist with VCing for sequencing and initiating task.

## 2019-09-27 NOTE — H&P ADULT - PROBLEM SELECTOR PLAN 5
- On metformin 1000 mg BID, glimepiride 4 mg, Januvia 100 mg, and Toujeo insulin - On metformin 1000 mg BID, glimepiride 4 mg, Januvia 100 mg, and Toujeo insulin  - on sliding scale while in hospital

## 2019-09-27 NOTE — PHYSICAL THERAPY INITIAL EVALUATION ADULT - PATIENT/FAMILY/SIGNIFICANT OTHER GOALS STATEMENT, PT EVAL
did not offer PT/functional mobility related goal. Though expressed he would like a "friend" and "talk to someone" SUZIE Vicente and MARGIE lara

## 2019-09-27 NOTE — ED BEHAVIORAL HEALTH ASSESSMENT NOTE - DETAILS
undergoing chemo will sign out to weekend team Discussed with POOJA Coates Pt states that he can not live independently and will die if sent back home. No SI or intent to harm self however can't get out of bed

## 2019-09-27 NOTE — ED ADULT NURSE NOTE - PMH
Chronic leukemia in remission    DM (diabetes mellitus)  Type 2  Hypertension    Sleep apnea, unspecified type

## 2019-09-27 NOTE — ED PROVIDER NOTE - PROGRESS NOTE DETAILS
[place]: labs grossly wnl, failed PT eval. evaluated by psych. given multiple comorbidities and PT failure, will admit medicine for further care.

## 2019-09-27 NOTE — H&P ADULT - PROBLEM SELECTOR PLAN 9
1) PCP Contacted on Admission: (Y/N) --> Name & Phone #: Dr. Gallo, oncologist  2) Date of Contact with PCP: 9/27/19  3) PCP Contacted at Discharge: (Y/N, N/A)  4) Summary of Handoff Given to PCP:   5) Post-Discharge Appointment Date and Location: F: none  E: replete prn  N: diabetic diet    PPx:  Dispo: [facility] then [facility]

## 2019-09-27 NOTE — ED PROVIDER NOTE - ATTENDING CONTRIBUTION TO CARE
74M PMH DM, HTN, CLL (chemo 3-4d ago), SUSY, CABG, schizoaffective p/w feeling sad. Lives at home. States he has been feeling sad for a long time and came today to the ER bec "I couldn't tae it anymore." Also feels suicidal, that "I don't want to live anymore." HAS no specific plan. States he is not eating or sleeping and just feels alone. Denies HA, SOB/CP, URI symptoms, vision changes, weakness/numbness, abd pain, urinary complaints. Denies SI/HI, toxic ingestions. Vitals wnl, exam as above.  ddx: Likely psych related.   Labs, psych consult, reassess.

## 2019-09-27 NOTE — ED BEHAVIORAL HEALTH ASSESSMENT NOTE - VIOLENCE PROTECTIVE FACTORS:
Date of Discharge:  04/04/2018



Consultants:  

1.Andres Rucker MD, with General Surgery.

2.Jamia Barragan MD, with ENT.



Procedures:  On 04/04/2018, endoscopy by Dr. Rucker.



Findings:  Diverticula found in the biliopancreatic portion of the duodenum.  Moderate gastritis at t
he anastomosis.  Marginal ulceration found at the anastomosis, duodenitis, esophagitis, jejunostomy u
lcer.



Admitting Diagnoses:  

1.Syncopal episode, likely vasovagal.

2.Epistaxis.

3.Essential hypertension.

4.Generalized anxiety disorder.

5.History of breast cancer.

6.Gastroesophageal reflux disease.

7.Hypothyroidism.

8.Failure to thrive.



Discharge Diagnoses:  

1.Syncopal episode.  Vasovagal.  Now recurrent.

2.Epistaxis, resolved.  Continue nasal spray.  The patient will follow up with Dr. Barragan, ENT, for
 scope.

3.Essential hypertension, stable.

4.Metabolic acidosis, improved.

5.Hypokalemia, replaced.

6.Hypomagnesemia, replaced.

7.Diarrhea, resolved.

8.Acute kidney injury, resolved.

9.Acute blood loss anemia.

10.Acute colitis, rectosigmoid.

11.Esophagitis.

12.Gastrointestinal bleed with melenic stools, status post EGD.

13.Malnutrition, failure to thrive.  BMI 19.

14.Hypothyroidism.

15.Gastroesophageal reflux disease.

16.Generalized anxiety disorder.



Hospital Course:  The patient is an 88-year-old female, who came in with syncopal episode.  The patie
nt had been taking more of her blood pressure medications than prescribed.  The patient lives alone a
nd does not have Home Health.  The patient actually does have home health and was found in her bed wi
th nosebleed.  The patient was brought into the ER.  She denies any trauma.  The patient's syncopal e
pisode was thought to be vasovagal.  Orthostatic vital signs were negative.  Carotid ultrasound did n
ot show any hemodynamically significant stenosis.  The patient worked well with physical therapy and 
did not have any further epistaxis.  Dr. Barragan was consulted, who evaluated the patient and recomme
nded a scope in her office.  She did not have any recurrence of her epistaxis and nasal saline spray 
was used.  The patient did have some mild electrolyte abnormalities, which were corrected.  She also 
did have a drop in her hemoglobin, and therefore, Dr. Rucker with Surgery was consulted.  GI was brandon
vailable.  The patient went for scope, as mentioned above.  She was found to have some esophagitis an
d the patient was continued on PPI.  She was also found to have some UTI with urine culture growing K
lebsiella and Escherichia coli.  Her CT scan showed some colitis in the rectosigmoid area.  Her antib
iotics were adjusted.  The patient did well overall.  Her white count normalized.  She was able to im
prove on her diet.  The patient was then cleared for discharge from consultants standpoint and was re
ferred to nursing home by Social Work upon family request.  The patient was then [facility] to [facility] in a fair condition.



Activity:  Fall precautions.



Medications:  As per medication reconciliation list.



Diet:  Heart healthy.



Followup:  Follow up with primary care physician in 1 week.  Follow up with Dr. Rucker, surgeon, in 
2 weeks.  Follow up with ENT, Dr. Barragan, if nosebleed recurs, return to ER for worsening condition.




Time Spent:  Total time spent discharging the patient was 37 minutes.



Discharge Physical Examination:  General:  Awake, alert, and oriented x3.  No acute distress.  Elderl
y female, cachectic, BMI 19. 

CV:  S1, S2.  No murmurs. 

Respiratory:  Moving air well bilaterally. 

Gastrointestinal:  Abdomen is soft, nontender, nondistended.  Positive bowel sounds. 

Extremities:  No clubbing, cyanosis, or edema. 

Neurologic:  Nonfocal.





SA/MODL

DD:  04/04/2018 13:29:18Voice ID:  [id number]

DT:  04/05/2018 02:41:18Report ID:  [id number]
Sobriety/Residential stability/Engagement in treatment

## 2019-09-28 DIAGNOSIS — F20.9 SCHIZOPHRENIA, UNSPECIFIED: ICD-10-CM

## 2019-09-28 PROCEDURE — 99233 SBSQ HOSP IP/OBS HIGH 50: CPT | Mod: GC

## 2019-09-28 RX ORDER — QUETIAPINE FUMARATE 200 MG/1
75 TABLET, FILM COATED ORAL DAILY
Refills: 0 | Status: DISCONTINUED | OUTPATIENT
Start: 2019-09-29 | End: 2019-10-01

## 2019-09-28 RX ORDER — MECLIZINE HCL 12.5 MG
25 TABLET ORAL ONCE
Refills: 0 | Status: COMPLETED | OUTPATIENT
Start: 2019-09-28 | End: 2019-09-28

## 2019-09-28 RX ADMIN — Medication 2: at 17:51

## 2019-09-28 RX ADMIN — CLOPIDOGREL BISULFATE 75 MILLIGRAM(S): 75 TABLET, FILM COATED ORAL at 11:10

## 2019-09-28 RX ADMIN — AMLODIPINE BESYLATE 5 MILLIGRAM(S): 2.5 TABLET ORAL at 07:12

## 2019-09-28 RX ADMIN — GABAPENTIN 600 MILLIGRAM(S): 400 CAPSULE ORAL at 22:35

## 2019-09-28 RX ADMIN — Medication 100 MILLIGRAM(S): at 07:11

## 2019-09-28 RX ADMIN — Medication 4: at 09:18

## 2019-09-28 RX ADMIN — Medication 2: at 12:20

## 2019-09-28 RX ADMIN — QUETIAPINE FUMARATE 50 MILLIGRAM(S): 200 TABLET, FILM COATED ORAL at 11:10

## 2019-09-28 RX ADMIN — ATORVASTATIN CALCIUM 80 MILLIGRAM(S): 80 TABLET, FILM COATED ORAL at 22:35

## 2019-09-28 RX ADMIN — GABAPENTIN 600 MILLIGRAM(S): 400 CAPSULE ORAL at 13:46

## 2019-09-28 RX ADMIN — Medication 4: at 22:35

## 2019-09-28 RX ADMIN — Medication 25 MILLIGRAM(S): at 23:54

## 2019-09-28 RX ADMIN — GABAPENTIN 600 MILLIGRAM(S): 400 CAPSULE ORAL at 07:26

## 2019-09-28 RX ADMIN — Medication 81 MILLIGRAM(S): at 11:10

## 2019-09-28 NOTE — DIETITIAN INITIAL EVALUATION ADULT. - PROBLEM SELECTOR PLAN 1
- Pt w/ hx of schizoaffective disorder? had initially presented to hospital due to thoughts of hurting himself, feelings of depression, not wanting to get out of bed or eat. He called EMS to bring him to hospital  - He states he is unable to take care of himself, does not want to go back home and wants to be placed in an assisted living or nursing home  - f/u psych recs but most likely pt can follow up with lexi outpatient after being placed in [facility]  - Pt on seroquel 200 mg BID at home. Will start on seroquel 50 mg here per psych

## 2019-09-28 NOTE — PROGRESS NOTE BEHAVIORAL HEALTH - OTHER
unable to assess On stretcher somewhat childlike Dramatic focused on inability to care for himself. pt does not appear internally preoccupied. Possibly over endorsing symptoms to gain admission

## 2019-09-28 NOTE — DIETITIAN INITIAL EVALUATION ADULT. - MALNUTRITION
Pt does not currently meet criteria for malnutrition, though at risk. Pt presents w/suspected moderate malnutrition, please see chart note

## 2019-09-28 NOTE — DIETITIAN INITIAL EVALUATION ADULT. - OTHER INFO
73 yo/male with PMHx CLL (currently on chemo), schizoaffective disorder, HTN, DM, CAD s/p CABG, HLD, neuropathy, presented from home w/inability to care for self, depression, and failure to thrive. Pt seen in room, awake, alert. He reports poor intake at home recently 2/2 inability to get to the store to buy food 2/2 weakness. Was only eating peanut butter and chips. Unable to gauge compliance w/DM meds. Reports not drinking Glucerna 2/2 diarrhea. Reports having a much better appetite currently w/intake of >50% of breakfast. No further complaints of N/V but did have loose stool on 9/27, pt believes possibly 2/2 chemo side effect. NKFA. Denies difficulty chewing or swallowing. Skin intact pressure-wise. Reports pain in B/L shoulders. He does endorse 20# weight loss x 1 month 2/2 poor PO intake, though no physical s/s malnutrition appreciated, and unclear of accuracy. Pt very focused on getting further chemo treatment while in hospital and would like to speak w/oncologist. Encouraged PO intake; not appropriate for diet counseling at this time. 73 yo/male with PMHx CLL (currently on chemo), schizoaffective disorder, HTN, DM, CAD s/p CABG, HLD, neuropathy, presented from home w/inability to care for self, depression, and failure to thrive. Pt seen in room, awake, alert. He reports poor intake at home recently 2/2 inability to get to the store to buy food 2/2 weakness. Was only eating peanut butter and chips. Unable to gauge compliance w/DM meds. Reports not drinking Glucerna 2/2 diarrhea. Reports having a much better appetite currently w/intake of >50% of breakfast. No further complaints of N/V but did have loose stool on 9/27, pt believes possibly 2/2 chemo side effect. NKFA. Denies difficulty chewing or swallowing. Skin intact pressure-wise. Reports pain in B/L shoulders. He does endorse 20# weight loss x 1 month 2/2 poor PO intake (unclear complete accuracy), though no physical s/s malnutrition appreciated. Pt very focused on getting further chemo treatment while in hospital and would like to speak w/oncologist. Encouraged PO intake; not appropriate for diet counseling at this time.

## 2019-09-28 NOTE — DIETITIAN INITIAL EVALUATION ADULT. - ADD RECOMMEND
1. Recommend addition of DASH diet restriction 2. Monitor lytes and replete prn. POC BG q6hrs 3. Pain and bowel regimens per team 4. Provide nutrition education as appropriate

## 2019-09-28 NOTE — DIETITIAN INITIAL EVALUATION ADULT. - PROBLEM SELECTOR PLAN 5
- On metformin 1000 mg BID, glimepiride 4 mg, Januvia 100 mg, and Toujeo insulin  - on sliding scale while in hospital

## 2019-09-28 NOTE — DIETITIAN INITIAL EVALUATION ADULT. - ENERGY NEEDS
Height 73"; #; #; 109%IBW  BMI 26.4  ActualBW used for calculations as pt between % of IBW. Needs estimated for maintenance in older adults; increased for suspected unintentional wt loss, at risk for malnutrition Height 73"; #; #; 109%IBW  BMI 26.4  ActualBW used for calculations as pt between % of IBW. Needs estimated for maintenance in older adults; increased for suspected unintentional wt loss, suspected moderate malnutrition

## 2019-09-28 NOTE — DIETITIAN INITIAL EVALUATION ADULT. - SIGNS/SYMPTOMS
AEB possibly unintentional wt loss, oncology nutrition guidelines AEB reported unintentional wt loss, oncology nutrition guidelines

## 2019-09-28 NOTE — PROGRESS NOTE BEHAVIORAL HEALTH - SUMMARY
Pt is a 75yo SWM, domiciled alone with dx of Chronic Schizophrenia (vs Schizoaffective D/O?), multiple prior inpt psych admits incl [facility] (none since 2014), not currently in outpatient psychiatric treatment, hx/o SUSY, CAD s/p CABG, Type 2 DM, HTN, diabetic neuropathy, obesity, spinal stenosis, CLL (undergoing chemo therapy with Dr. Gallo [phone]), was BIBEMS after pt called 911 asking to be taken to the hospital. Pt is focused on inability to care for himself, requesting NH placement.   He is possibly over endorsing psychiatric symptoms to gain admission. Pt reports ongoing depression, passive SI, and AH at admission.    No major mood episode or acute psychosis noted at this time.  No evidence of acute risk to self or others.   psychiatry will continue to follow pt.     Chronic Paranoid Schizophrenia  Schizoaffective Disorder    Recommendations:  1. Observation status as per primary team  2. Social intervention for not being able to care for self  3. Increase seroquel 50mg daily to 75mg daily

## 2019-09-29 LAB
ANION GAP SERPL CALC-SCNC: 11 MMOL/L — SIGNIFICANT CHANGE UP (ref 5–17)
BUN SERPL-MCNC: 9 MG/DL — SIGNIFICANT CHANGE UP (ref 7–23)
CALCIUM SERPL-MCNC: 9.2 MG/DL — SIGNIFICANT CHANGE UP (ref 8.4–10.5)
CHLORIDE SERPL-SCNC: 104 MMOL/L — SIGNIFICANT CHANGE UP (ref 96–108)
CO2 SERPL-SCNC: 24 MMOL/L — SIGNIFICANT CHANGE UP (ref 22–31)
CREAT SERPL-MCNC: 0.69 MG/DL — SIGNIFICANT CHANGE UP (ref 0.5–1.3)
GLUCOSE SERPL-MCNC: 223 MG/DL — HIGH (ref 70–99)
HCT VFR BLD CALC: 51.8 % — HIGH (ref 39–50)
HGB BLD-MCNC: 15.6 G/DL — SIGNIFICANT CHANGE UP (ref 13–17)
MAGNESIUM SERPL-MCNC: 1.6 MG/DL — SIGNIFICANT CHANGE UP (ref 1.6–2.6)
MCHC RBC-ENTMCNC: 24.9 PG — LOW (ref 27–34)
MCHC RBC-ENTMCNC: 30.1 GM/DL — LOW (ref 32–36)
MCV RBC AUTO: 82.7 FL — SIGNIFICANT CHANGE UP (ref 80–100)
NRBC # BLD: 0 /100 WBCS — SIGNIFICANT CHANGE UP (ref 0–0)
PLATELET # BLD AUTO: 221 K/UL — SIGNIFICANT CHANGE UP (ref 150–400)
POTASSIUM SERPL-MCNC: 4.3 MMOL/L — SIGNIFICANT CHANGE UP (ref 3.5–5.3)
POTASSIUM SERPL-SCNC: 4.3 MMOL/L — SIGNIFICANT CHANGE UP (ref 3.5–5.3)
RBC # BLD: 6.26 M/UL — HIGH (ref 4.2–5.8)
RBC # FLD: 22 % — HIGH (ref 10.3–14.5)
SODIUM SERPL-SCNC: 139 MMOL/L — SIGNIFICANT CHANGE UP (ref 135–145)
WBC # BLD: 11.96 K/UL — HIGH (ref 3.8–10.5)
WBC # FLD AUTO: 11.96 K/UL — HIGH (ref 3.8–10.5)

## 2019-09-29 PROCEDURE — 99232 SBSQ HOSP IP/OBS MODERATE 35: CPT | Mod: GC

## 2019-09-29 RX ORDER — MAGNESIUM OXIDE 400 MG ORAL TABLET 241.3 MG
400 TABLET ORAL ONCE
Refills: 0 | Status: COMPLETED | OUTPATIENT
Start: 2019-09-29 | End: 2019-09-29

## 2019-09-29 RX ORDER — MAGNESIUM SULFATE 500 MG/ML
1 VIAL (ML) INJECTION ONCE
Refills: 0 | Status: DISCONTINUED | OUTPATIENT
Start: 2019-09-29 | End: 2019-09-29

## 2019-09-29 RX ADMIN — Medication 100 MILLIGRAM(S): at 06:01

## 2019-09-29 RX ADMIN — Medication 2: at 12:50

## 2019-09-29 RX ADMIN — QUETIAPINE FUMARATE 75 MILLIGRAM(S): 200 TABLET, FILM COATED ORAL at 11:08

## 2019-09-29 RX ADMIN — CLOPIDOGREL BISULFATE 75 MILLIGRAM(S): 75 TABLET, FILM COATED ORAL at 11:08

## 2019-09-29 RX ADMIN — Medication 4: at 08:45

## 2019-09-29 RX ADMIN — GABAPENTIN 600 MILLIGRAM(S): 400 CAPSULE ORAL at 13:33

## 2019-09-29 RX ADMIN — Medication 8: at 22:35

## 2019-09-29 RX ADMIN — GABAPENTIN 600 MILLIGRAM(S): 400 CAPSULE ORAL at 22:35

## 2019-09-29 RX ADMIN — ATORVASTATIN CALCIUM 80 MILLIGRAM(S): 80 TABLET, FILM COATED ORAL at 22:35

## 2019-09-29 RX ADMIN — MAGNESIUM OXIDE 400 MG ORAL TABLET 400 MILLIGRAM(S): 241.3 TABLET ORAL at 09:26

## 2019-09-29 RX ADMIN — Medication 81 MILLIGRAM(S): at 11:08

## 2019-09-29 RX ADMIN — Medication 6: at 17:27

## 2019-09-29 RX ADMIN — AMLODIPINE BESYLATE 5 MILLIGRAM(S): 2.5 TABLET ORAL at 06:01

## 2019-09-29 RX ADMIN — GABAPENTIN 600 MILLIGRAM(S): 400 CAPSULE ORAL at 06:01

## 2019-09-29 NOTE — PROGRESS NOTE BEHAVIORAL HEALTH - SUMMARY
Pt is a 75yo SWM, domiciled alone with dx of Chronic Schizophrenia (vs Schizoaffective D/O?), multiple prior inpt psych admits incl [facility] (none since 2014), not currently in outpatient psychiatric treatment, hx/o SUSY, CAD s/p CABG, Type 2 DM, HTN, diabetic neuropathy, obesity, spinal stenosis, CLL (undergoing chemo therapy with Dr. Gallo [phone]), was BIBEMS after pt called 911 asking to be taken to the hospital. Pt is focused on inability to care for himself, requesting NH placement.   He is possibly over endorsing psychiatric symptoms to gain admission. Pt reports ongoing depression, passive SI, and AH at admission.    No major mood episode or acute psychosis noted at this time.  No evidence of acute risk to self or others.   psychiatry will continue to follow pt.     Chronic Paranoid Schizophrenia  Schizoaffective Disorder    Recommendations:  1. Observation status as per primary team  2. Social intervention for not being able to care for self  3. Continue Seroquel 75mg daily

## 2019-09-29 NOTE — PROGRESS NOTE BEHAVIORAL HEALTH - OTHER
somewhat childlike focused on inability to care for himself. pt does not appear internally preoccupied. Possibly over endorsing symptoms to gain admission unable to assess On stretcher

## 2019-09-30 ENCOUNTER — TRANSCRIPTION ENCOUNTER (OUTPATIENT)
Age: 74
End: 2019-09-30

## 2019-09-30 LAB
ANION GAP SERPL CALC-SCNC: 12 MMOL/L — SIGNIFICANT CHANGE UP (ref 5–17)
BUN SERPL-MCNC: 13 MG/DL — SIGNIFICANT CHANGE UP (ref 7–23)
CALCIUM SERPL-MCNC: 8.9 MG/DL — SIGNIFICANT CHANGE UP (ref 8.4–10.5)
CHLORIDE SERPL-SCNC: 103 MMOL/L — SIGNIFICANT CHANGE UP (ref 96–108)
CO2 SERPL-SCNC: 23 MMOL/L — SIGNIFICANT CHANGE UP (ref 22–31)
CREAT SERPL-MCNC: 0.65 MG/DL — SIGNIFICANT CHANGE UP (ref 0.5–1.3)
GLUCOSE SERPL-MCNC: 201 MG/DL — HIGH (ref 70–99)
HCT VFR BLD CALC: 47.5 % — SIGNIFICANT CHANGE UP (ref 39–50)
HGB BLD-MCNC: 14.4 G/DL — SIGNIFICANT CHANGE UP (ref 13–17)
MAGNESIUM SERPL-MCNC: 1.7 MG/DL — SIGNIFICANT CHANGE UP (ref 1.6–2.6)
MCHC RBC-ENTMCNC: 25.2 PG — LOW (ref 27–34)
MCHC RBC-ENTMCNC: 30.3 GM/DL — LOW (ref 32–36)
MCV RBC AUTO: 83.2 FL — SIGNIFICANT CHANGE UP (ref 80–100)
NRBC # BLD: 0 /100 WBCS — SIGNIFICANT CHANGE UP (ref 0–0)
PLATELET # BLD AUTO: 196 K/UL — SIGNIFICANT CHANGE UP (ref 150–400)
POTASSIUM SERPL-MCNC: 4.3 MMOL/L — SIGNIFICANT CHANGE UP (ref 3.5–5.3)
POTASSIUM SERPL-SCNC: 4.3 MMOL/L — SIGNIFICANT CHANGE UP (ref 3.5–5.3)
RBC # BLD: 5.71 M/UL — SIGNIFICANT CHANGE UP (ref 4.2–5.8)
RBC # FLD: 21.7 % — HIGH (ref 10.3–14.5)
SODIUM SERPL-SCNC: 138 MMOL/L — SIGNIFICANT CHANGE UP (ref 135–145)
WBC # BLD: 11.44 K/UL — HIGH (ref 3.8–10.5)
WBC # FLD AUTO: 11.44 K/UL — HIGH (ref 3.8–10.5)

## 2019-09-30 PROCEDURE — 99233 SBSQ HOSP IP/OBS HIGH 50: CPT | Mod: GC

## 2019-09-30 PROCEDURE — 99232 SBSQ HOSP IP/OBS MODERATE 35: CPT

## 2019-09-30 RX ORDER — QUETIAPINE FUMARATE 200 MG/1
100 TABLET, FILM COATED ORAL ONCE
Refills: 0 | Status: COMPLETED | OUTPATIENT
Start: 2019-09-30 | End: 2019-09-30

## 2019-09-30 RX ORDER — ENOXAPARIN SODIUM 100 MG/ML
40 INJECTION SUBCUTANEOUS ONCE
Refills: 0 | Status: COMPLETED | OUTPATIENT
Start: 2019-09-30 | End: 2019-09-30

## 2019-09-30 RX ADMIN — ATORVASTATIN CALCIUM 80 MILLIGRAM(S): 80 TABLET, FILM COATED ORAL at 21:31

## 2019-09-30 RX ADMIN — Medication 4: at 17:52

## 2019-09-30 RX ADMIN — AMLODIPINE BESYLATE 5 MILLIGRAM(S): 2.5 TABLET ORAL at 06:17

## 2019-09-30 RX ADMIN — CLOPIDOGREL BISULFATE 75 MILLIGRAM(S): 75 TABLET, FILM COATED ORAL at 12:13

## 2019-09-30 RX ADMIN — GABAPENTIN 600 MILLIGRAM(S): 400 CAPSULE ORAL at 06:17

## 2019-09-30 RX ADMIN — GABAPENTIN 600 MILLIGRAM(S): 400 CAPSULE ORAL at 13:31

## 2019-09-30 RX ADMIN — QUETIAPINE FUMARATE 75 MILLIGRAM(S): 200 TABLET, FILM COATED ORAL at 12:12

## 2019-09-30 RX ADMIN — Medication 81 MILLIGRAM(S): at 12:09

## 2019-09-30 RX ADMIN — Medication 100 MILLIGRAM(S): at 06:17

## 2019-09-30 RX ADMIN — Medication 4: at 08:54

## 2019-09-30 RX ADMIN — Medication 6: at 12:24

## 2019-09-30 RX ADMIN — GABAPENTIN 600 MILLIGRAM(S): 400 CAPSULE ORAL at 21:31

## 2019-09-30 RX ADMIN — ENOXAPARIN SODIUM 40 MILLIGRAM(S): 100 INJECTION SUBCUTANEOUS at 12:24

## 2019-09-30 NOTE — PROGRESS NOTE BEHAVIORAL HEALTH - NSBHCHARTREVIEWIMAGING_PSY_A_CORE FT
09-30    138  |  103  |  13  ----------------------------<  201<H>  4.3   |  23  |  0.65    Ca    8.9      30 Sep 2019 06:23  Mg     1.7     09-30 CBC Full  -  ( 30 Sep 2019 06:23 )  WBC Count : 11.44 K/uL  RBC Count : 5.71 M/uL  Hemoglobin : 14.4 g/dL  Hematocrit : 47.5 %  Platelet Count - Automated : 196 K/uL  Mean Cell Volume : 83.2 fl  Mean Cell Hemoglobin : 25.2 pg  Mean Cell Hemoglobin Concentration : 30.3 gm/dL  Auto Neutrophil # : x  Auto Lymphocyte # : x  Auto Monocyte # : x  Auto Eosinophil # : x  Auto Basophil # : x  Auto Neutrophil % : x  Auto Lymphocyte % : x  Auto Monocyte % : x  Auto Eosinophil % : x  Auto Basophil % : x

## 2019-09-30 NOTE — DISCHARGE NOTE PROVIDER - NSDCCPCAREPLAN_GEN_ALL_CORE_FT
PRINCIPAL DISCHARGE DIAGNOSIS  Diagnosis: Suicidal behavior  Assessment and Plan of Treatment: You came in with feelings of hopelessness and feeling down. You were evaluated by psychiatry that determined that inpatient psychiatry was not needed. We treated you with a medicine called seroquel while here, that you take at home as well. You should follow up with your outpatient PCP and psychiatrist for more assistance.

## 2019-09-30 NOTE — DISCHARGE NOTE PROVIDER - CARE PROVIDER_API CALL
Abhi Gallo (MD)  Hematology; Internal Medicine; Medical Oncology  [address]  [place] [address]  Phone: [phone]  Fax: [phone]  Follow Up Time: 1 week

## 2019-09-30 NOTE — CONSULT NOTE ADULT - SUBJECTIVE AND OBJECTIVE BOX
Patient is a 74y old  Male who presents with a chief complaint of Psych (29 Sep 2019 09:32)       HPI:  Pt is a 73 y/o M w/ pmhx of CLL (receiving rituxan started about 1 week ago next tx due 10/1), schizoaffective disorder?, HTN, DM, CAD s/p CABG in 2004 and 2 stents placed 6 months ago, HLD, peripheral neuropathy presenting with complaints feelings of increased depression and states he is unable to take care of himself anymore. Pt reports he has been feeling lonely, not wanting to get out of bed and has had decreased appetite stating he has lost 20 lbs. Pt had called EMS to bring him into the hospital. He states he does not want to go back to his home because there is no one there to help take care of him. He reports he has no family or friends and is requesting to be placed in an assisted living facility or nursing home. At baseline, he states he uses a cane to get around, but has been having more difficulty getting around. He denies any chest pain, SOB, fever, chills, abd pain, N/V/D. He does mention he has some chronic left shoulder pain that's due to a rotator cuff tear years ago.     In ED, T 98.2, HR 79, /98, RR 18, O2 100% on RA. Labs wbc 10.8, EKG NSR w/ T wave abnormalities in lateral leads. Pt assessed by psych and given Seroquel 50 mg. (27 Sep 2019 13:16)      PAST MEDICAL & SURGICAL HISTORY:  DM (diabetes mellitus): Type 2  Chronic leukemia in remission  Sleep apnea, unspecified type  Hypertension  S/P CABG (coronary artery bypass graft)      MEDICATIONS  (STANDING):  amLODIPine   Tablet 5 milliGRAM(s) Oral daily  aspirin enteric coated 81 milliGRAM(s) Oral daily  atorvastatin 80 milliGRAM(s) Oral at bedtime  clopidogrel Tablet 75 milliGRAM(s) Oral daily  dextrose 5%. 1000 milliLiter(s) (50 mL/Hr) IV Continuous <Continuous>  dextrose 50% Injectable 12.5 Gram(s) IV Push once  dextrose 50% Injectable 25 Gram(s) IV Push once  dextrose 50% Injectable 25 Gram(s) IV Push once  gabapentin 600 milliGRAM(s) Oral three times a day  insulin lispro (HumaLOG) corrective regimen sliding scale   SubCutaneous Before meals and at bedtime  metoprolol succinate  milliGRAM(s) Oral daily  QUEtiapine 75 milliGRAM(s) Oral daily    MEDICATIONS  (PRN):  dextrose 40% Gel 15 Gram(s) Oral once PRN Blood Glucose LESS THAN 70 milliGRAM(s)/deciliter  glucagon  Injectable 1 milliGRAM(s) IntraMuscular once PRN Glucose LESS THAN 70 milligrams/deciliter      Social History:            -  Occupation: X           -  Home Living Status: lives alone in [facility] in [place], elevator accessible apartment building           -  Prior Home Care Services:  X    Functional Level Prior to Admission:           - ADL's:  admits to difficulty with self care           - ambulatory status: walked with a cane, h/o mechanical falls    FAMILY HISTORY:  No pertinent family history in first degree relatives      CBC Full  -  ( 30 Sep 2019 06:23 )  WBC Count : 11.44 K/uL  RBC Count : 5.71 M/uL  Hemoglobin : 14.4 g/dL  Hematocrit : 47.5 %  Platelet Count - Automated : 196 K/uL  Mean Cell Volume : 83.2 fl  Mean Cell Hemoglobin : 25.2 pg  Mean Cell Hemoglobin Concentration : 30.3 gm/dL  Auto Neutrophil # : x  Auto Lymphocyte # : x  Auto Monocyte # : x  Auto Eosinophil # : x  Auto Basophil # : x  Auto Neutrophil % : x  Auto Lymphocyte % : x  Auto Monocyte % : x  Auto Eosinophil % : x  Auto Basophil % : x      09-30    138  |  103  |  13  ----------------------------<  201<H>  4.3   |  23  |  0.65    Ca    8.9      30 Sep 2019 06:23  Mg     1.7     09-30              Radiology:              Vital Signs Last 24 Hrs  T(C): 36.4 (30 Sep 2019 08:45), Max: 36.7 (29 Sep 2019 16:32)  T(F): 97.5 (30 Sep 2019 08:45), Max: 98.1 (29 Sep 2019 16:32)  HR: 73 (30 Sep 2019 08:45) (72 - 78)  BP: 125/68 (30 Sep 2019 08:45) (121/66 - 133/73)  BP(mean): --  RR: 18 (30 Sep 2019 08:45) (16 - 18)  SpO2: 98% (30 Sep 2019 08:45) (95% - 98%)    REVIEW OF SYSTEMS:    CONSTITUTIONAL:  fatigue  EYES: No eye pain, visual disturbances, or discharge  ENMT:  No difficulty hearing, tinnitus, vertigo; No sinus or throat pain  NECK: No pain or stiffness  BREASTS: No pain, masses, or nipple discharge  RESPIRATORY: No cough, wheezing, chills or hemoptysis; No shortness of breath  CARDIOVASCULAR: No chest pain, palpitations, dizziness, or leg swelling  GASTROINTESTINAL: No abdominal or epigastric pain. No nausea, vomiting, or hematemesis; No diarrhea or constipation. No melena or hematochezia.  GENITOURINARY: No dysuria, frequency, hematuria, or incontinence  NEUROLOGICAL: No headaches, memory loss, loss of strength, numbness, or tremors  SKIN: No itching, burning, rashes, or lesions   LYMPH NODES: No enlarged glands  ENDOCRINE: No heat or cold intolerance; No hair loss  MUSCULOSKELETAL: No joint pain or swelling; No muscle, back, or extremity pain  PSYCHIATRIC: No depression, anxiety, mood swings, or difficulty sleeping  HEME/LYMPH: No easy bruising, or bleeding gums  ALLERGY AND IMMUNOLOGIC: No hives or eczema  VASCULAR: no swelling, erythema      Physical Exam: obese 75 yo  gentleman lying in semi Yousif's position, "mood is better"    Head: normocephalic, atraumatic    Eyes: PERRLA, EOMI, no nystagmus, sclera anicteric    ENT: nasal discharge, uvula midline, no oropharyngeal erythema/exudate    Neck: supple, negative JVD, negative carotid bruits, no thyromegaly    Chest: CTA bilaterally, neg wheeze/ rhonchi/ rales/ crackles/ egophany    Cardiovascular: regular rate and rhythm, neg murmurs/rubs/gallops    Abdomen: soft, obese, non tender, negative rebound/guarding, normal bowel sounds    Extremities: WWP, neg cyanosis/clubbing/edema, negative calf tenderness to palpation, negative Idalia's sign, well healed LE scars ( h/o falls)    :     Neurologic Exam:    Alert and oriented to person, place, date/year, speech fluent w/o dysarthria, recent and remote memory intact, repetition intact, comprehension intact    Cranial Nerves:     II:                       pupils equal, round and reactive to light, visual fields intact   III/ IV/VI:             extraocular movements intact, neg nystagmus, neg ptosis  V:                       facial sensation intact, V1-3 normal  VII:                     face symmetric, no droop, normal eye closure and smile  VIII:                    hearing intact to finger rub bilaterally  IX/ X:                 soft palate rise symmetrical  XI:                      head turning, shoulder shrug normal  XII:                     tongue midline    Motor Exam:    Upper Extremities:     RIght:    no focal weakness               negative drift    Left :      no focal weakness               negative drift    Lower Extremities:                 Right:      no focal weakness                 Left:       no focal weakness                   Sensory:   dec adrian MAGANA's    DTR:            = biceps/     triceps/     brachioradialis                      = patella/   medial hamstring/ankle                      neg clonus                      neg Babinski                        Gait:  not tested        PM&R Impression:    1) deconditioned  2) no focal weakness  3) DM peripheral neuropathy        Recommendations:    1) Physical therapy focusing on therapeutic exercises, bed mobility/transfer out of bed evaluation, progressive ambulation with assistive devices prn.    2) Anticipated Disposition Plan/Recs: subacute rehab placement

## 2019-09-30 NOTE — PROGRESS NOTE BEHAVIORAL HEALTH - NSBHCHARTREVIEWLAB_PSY_A_CORE FT
CBC Full  -  ( 29 Sep 2019 06:14 )  WBC Count : 11.96 K/uL  RBC Count : 6.26 M/uL  Hemoglobin : 15.6 g/dL  Hematocrit : 51.8 %  Platelet Count - Automated : 221 K/uL  Mean Cell Volume : 82.7 fl  Mean Cell Hemoglobin : 24.9 pg  Mean Cell Hemoglobin Concentration : 30.1 gm/dL  Auto Neutrophil # : x  Auto Lymphocyte # : x  Auto Monocyte # : x  Auto Eosinophil # : x  Auto Basophil # : x  Auto Neutrophil % : x  Auto Lymphocyte % : x  Auto Monocyte % : x  Auto Eosinophil % : x  Auto Basophil % : x
CBC Full  -  ( 28 Sep 2019 10:48 )  WBC Count : 10.43 K/uL  RBC Count : 5.69 M/uL  Hemoglobin : 14.3 g/dL  Hematocrit : 46.1 %  Platelet Count - Automated : 244 K/uL  Mean Cell Volume : 81.0 fl  Mean Cell Hemoglobin : 25.1 pg  Mean Cell Hemoglobin Concentration : 31.0 gm/dL  Auto Neutrophil # : x    09-28    134<L>  |  100  |  8   ----------------------------<  276<H>  4.0   |  25  |  0.67    Ca    9.0      28 Sep 2019 10:48  Phos  4.0     09-28  Mg     1.6     09-28    TPro  7.3  /  Alb  4.1  /  TBili  0.7  /  DBili  x   /  AST  14  /  ALT  12  /  AlkPhos  93  09-27
CBC Full  -  ( 30 Sep 2019 06:23 )  WBC Count : 11.44 K/uL  RBC Count : 5.71 M/uL  Hemoglobin : 14.4 g/dL  Hematocrit : 47.5 %  Platelet Count - Automated : 196 K/uL  Mean Cell Volume : 83.2 fl  Mean Cell Hemoglobin : 25.2 pg  Mean Cell Hemoglobin Concentration : 30.3 gm/dL  Auto Neutrophil # : x  Auto Lymphocyte # : x  Auto Monocyte # : x  Auto Eosinophil # : x  Auto Basophil # : x  Auto Neutrophil % : x  Auto Lymphocyte % : x  Auto Monocyte % : x  Auto Eosinophil % : x  Auto Basophil % : x

## 2019-09-30 NOTE — PROGRESS NOTE BEHAVIORAL HEALTH - SUMMARY
Pt is a 73yo SWM, domiciled alone with dx of Chronic Schizophrenia (vs Schizoaffective D/O?), multiple prior inpt psych admits incl [facility] (none since 2014), not currently in outpatient psychiatric treatment, hx/o SUSY, CAD s/p CABG, Type 2 DM, HTN, diabetic neuropathy, obesity, spinal stenosis, CLL (undergoing chemo therapy with Dr. Gallo [phone]), was BIBEMS after pt called 911 asking to be taken to the hospital. Pt is focused on inability to care for himself, requesting NH placement.   He is possibly over endorsing psychiatric symptoms to gain admission. Pt reports ongoing depression, passive SI, and AH at admission.    No major mood episode or acute psychosis noted at this time.  No evidence of acute risk to self or others.    Chronic Paranoid Schizophrenia  Schizoaffective Disorder    Recommendations:  1. Observation status as per primary team  2. Social intervention for not being able to care for self  3. Continue Seroquel 75mg daily Pt is a 73yo SWM, domiciled alone with dx of Chronic Schizophrenia (vs Schizoaffective D/O?), multiple prior inpt psych admits incl [facility] (none since 2014), not currently in outpatient psychiatric treatment, hx/o SUSY, CAD s/p CABG, Type 2 DM, HTN, diabetic neuropathy, obesity, spinal stenosis, CLL (undergoing chemo therapy with Dr. Gallo [phone]), was BIBEMS after pt called 911 asking to be taken to the hospital. Patient currently presents with baseline irritability, no acute psychiatric symptoms. He is requesting placing to assisted living or a nursing home. He is not appropriate for a psychiatric admission.   Plan:  -patient is not appropriate for inpatient psychiatric hospitalization Pt is a 73yo SWM, domiciled alone with dx of Chronic Schizophrenia (vs Schizoaffective D/O?), multiple prior inpt psych admits incl [facility] (none since 2014), not currently in outpatient psychiatric treatment, hx/o SUSY, CAD s/p CABG, Type 2 DM, HTN, diabetic neuropathy, obesity, spinal stenosis, CLL (undergoing chemo therapy with Dr. Gallo [phone]), was BIBEMS after pt called 911 asking to be taken to the hospital. Patient currently presents with baseline irritability, no acute psychiatric symptoms. He is requesting placing to assisted living or a nursing home. He is not appropriate for a psychiatric admission.   Plan:  -patient is not appropriate for inpatient psychiatric hospitalization  -continue seroquel 75mg po daily

## 2019-09-30 NOTE — DISCHARGE NOTE PROVIDER - NSFOLLOWUPCLINICS_GEN_ALL_ED_FT
[facility]  Psychiatry  [address] [place] [id number]  Phone: [phone]  Fax:   Follow Up Time:

## 2019-09-30 NOTE — DISCHARGE NOTE PROVIDER - HOSPITAL COURSE
75 y/o M w/ pmhx of CLL,  schizoaffective disorder, HTN, DM, CAD s/p CABG in 2004 and 2 stents placed 6 months ago, HLD, peripheral neuropathy presenting with complaints feelings of increased depression and states he is unable to take care of himself anymore and admitted for suicidal ideations.         1. Suicidal behavior: hx of schizoaffective disorder and was evaluated by psych in the ED and said either SAD or schizophrenia. He had initially presented to hospital due to thoughts of hurting himself, feelings of depression, not wanting to get out of bed or eat. He called EMS to bring him to hospital 73 y/o M w/ pmhx of CLL,  schizoaffective disorder, HTN, DM, CAD s/p CABG in 2004 and 2 stents placed 6 months ago, HLD, peripheral neuropathy presenting with complaints feelings of increased depression and states he is unable to take care of himself anymore and admitted for suicidal ideations.         1. Suicidal behavior: hx of schizoaffective disorder and was evaluated by psych in the ED and said either SAD or schizophrenia. He had initially presented to hospital due to thoughts of hurting himself, feelings of depression, not wanting to get out of bed or eat. He called EMS to bring him to hospital. He was evaluated by PT who said that he does not need AURY.         2. Weakness: Patient reports generalized weakness, with complaints of not being able to take care of himself. Patient is likely deconditioned, no evidence of infection, no evidence of infection, strength intact.     3. CLL- Patient follows up with oncologist Dr. Gallo, who is on rituxan for treatment. He has his chemotherapy scheduled for outpatient on Friday, October 4.     4. CAD- continued with home medications     5. Diabetes: Sliding scale while inpatient         Inpatient Treatment Course: Patient was brought in by EMS after feeling hopeless and not being able to take care of himself. He wishes to be placed in a facility. Jose Manuel saw him and cleared him for no inpatient services, and PT cleared him to go home.         New Medications: N/A     New Exams to follow: N/ A     New Labs to follow: N/A

## 2019-09-30 NOTE — CONSULT NOTE ADULT - ASSESSMENT
per Internal Medicine    Pt is a 75 y/o M w/ pmhx of CLL (receiving rituxan started about 1 week ago next tx due 10/1), schizoaffective disorder?, HTN, DM, CAD s/p CABG in 2004 and 2 stents placed 6 months ago, HLD, peripheral neuropathy presenting with complaints feelings of increased depression and states he is unable to take care of himself anymore.     Problem/Plan - 1:  ·  Problem: Suicidal behavior.  Plan: - Pt w/ hx of schizoaffective disorder and was evaluated by psych in the ED and said either SAD or schizophrenia. He had initially presented to hospital due to thoughts of hurting himself, feelings of depression, not wanting to get out of bed or eat. He called EMS to bring him to hospital  - He states he is unable to take care of himself, does not want to go back home and wants to be placed in an assisted living or nursing home  - psych recs but most likely pt can follow up with [facility] outpatient after being placed in [facility]  - Pt on seroquel 200 mg BID at home. Will start on seroquel 75 mg here per psych.     Problem/Plan - 2:  ·  Problem: Weakness.  Plan: - Pt reports generalized weakness with complaints of not being able to take care of himself  - States it is hard for him to get around with a cane  - Likely due to overall deconditioning, CLL, no evidence of any infection, strength is good on exam.  - PT recommends AURY, appreciate OT recs.   - re-evaluation by PT as pt seems to be able to walk around.     Problem/Plan - 3:  ·  Problem: CLL (chronic lymphocytic leukemia).  Plan: - Pt w/ CLL reportedly dx in 2012. Pt states he follows with Dr. Gallo, oncologist who started in him on Rituxan last week.   - Spoke with Dr. Woodson who is covering for Dr. Gallo who reports that pt is due for his next infusion of Rituxan on 10/1 and does not require inpatient therapy. Also, notes that there were plans to start pt on ibrutinib 420 mg but it is not clear if pt has started it yet  - f/u outpatient for Rituxan thearpy.     Problem/Plan - 4:  ·  Problem: CAD (coronary artery disease).  Plan: - Pt s/p CABG in 2004 and 2 stents 6 months ago  - Pt on asa 81 mg and plavix 75 mg.     Problem/Plan - 5:  ·  Problem: DM (diabetes mellitus).  Plan: - On metformin 1000 mg BID, glimepiride 4 mg, Januvia 100 mg, and Toujeo insulin  - on sliding scale while in hospital.     Problem/Plan - 6:  Problem: Peripheral neuropathy. Plan: - Pt reports hx of b/l peripheral neuropathy likely 2/2 to diabetes  - on gabapentin 600 mg TID.    Problem/Plan - 7:  ·  Problem: Hypertension.  Plan: - On Toprol  mg QD, norvasc 5 mg QD.     Problem/Plan - 8:  ·  Problem: Hyperlipidemia.  Plan: - on Crestor 40 mg QD.     Problem/Plan - 9:  ·  Problem: Nutrition, metabolism, and development symptoms.  Plan: F: none  E: replete prn  N: diabetic diet    PPx:

## 2019-09-30 NOTE — PROGRESS NOTE BEHAVIORAL HEALTH - OTHER
unable to assess On stretcher somewhat childlike focused on inability to care for himself. pt does not appear internally preoccupied. Possibly over endorsing symptoms to gain admission

## 2019-09-30 NOTE — DISCHARGE NOTE NURSING/CASE MANAGEMENT/SOCIAL WORK - PATIENT PORTAL LINK FT
You can access the FollowMyHealth Patient Portal offered by [facility] by registering at the following website: [email or web address]. By joining [employer]’s FollowMyHealth portal, you will also be able to view your health information using other applications (apps) compatible with our system.

## 2019-10-01 VITALS
HEART RATE: 83 BPM | SYSTOLIC BLOOD PRESSURE: 125 MMHG | DIASTOLIC BLOOD PRESSURE: 71 MMHG | OXYGEN SATURATION: 96 % | RESPIRATION RATE: 18 BRPM | TEMPERATURE: 98 F

## 2019-10-01 PROCEDURE — 99232 SBSQ HOSP IP/OBS MODERATE 35: CPT

## 2019-10-01 PROCEDURE — 84100 ASSAY OF PHOSPHORUS: CPT

## 2019-10-01 PROCEDURE — 80307 DRUG TEST PRSMV CHEM ANLYZR: CPT

## 2019-10-01 PROCEDURE — 97116 GAIT TRAINING THERAPY: CPT

## 2019-10-01 PROCEDURE — 83036 HEMOGLOBIN GLYCOSYLATED A1C: CPT

## 2019-10-01 PROCEDURE — 82962 GLUCOSE BLOOD TEST: CPT

## 2019-10-01 PROCEDURE — 36415 COLL VENOUS BLD VENIPUNCTURE: CPT

## 2019-10-01 PROCEDURE — 99285 EMERGENCY DEPT VISIT HI MDM: CPT | Mod: 25

## 2019-10-01 PROCEDURE — 97162 PT EVAL MOD COMPLEX 30 MIN: CPT

## 2019-10-01 PROCEDURE — 85025 COMPLETE CBC W/AUTO DIFF WBC: CPT

## 2019-10-01 PROCEDURE — 80053 COMPREHEN METABOLIC PANEL: CPT

## 2019-10-01 PROCEDURE — 93005 ELECTROCARDIOGRAM TRACING: CPT

## 2019-10-01 PROCEDURE — 84443 ASSAY THYROID STIM HORMONE: CPT

## 2019-10-01 PROCEDURE — 80048 BASIC METABOLIC PNL TOTAL CA: CPT

## 2019-10-01 PROCEDURE — 99239 HOSP IP/OBS DSCHRG MGMT >30: CPT

## 2019-10-01 PROCEDURE — 83735 ASSAY OF MAGNESIUM: CPT

## 2019-10-01 PROCEDURE — 85027 COMPLETE CBC AUTOMATED: CPT

## 2019-10-01 PROCEDURE — 86803 HEPATITIS C AB TEST: CPT

## 2019-10-01 RX ADMIN — Medication 100 MILLIGRAM(S): at 05:00

## 2019-10-01 RX ADMIN — AMLODIPINE BESYLATE 5 MILLIGRAM(S): 2.5 TABLET ORAL at 05:00

## 2019-10-01 RX ADMIN — Medication 81 MILLIGRAM(S): at 12:33

## 2019-10-01 RX ADMIN — GABAPENTIN 600 MILLIGRAM(S): 400 CAPSULE ORAL at 12:33

## 2019-10-01 RX ADMIN — Medication 2: at 08:52

## 2019-10-01 RX ADMIN — CLOPIDOGREL BISULFATE 75 MILLIGRAM(S): 75 TABLET, FILM COATED ORAL at 12:33

## 2019-10-01 RX ADMIN — GABAPENTIN 600 MILLIGRAM(S): 400 CAPSULE ORAL at 05:00

## 2019-10-01 RX ADMIN — QUETIAPINE FUMARATE 100 MILLIGRAM(S): 200 TABLET, FILM COATED ORAL at 04:50

## 2019-10-01 RX ADMIN — QUETIAPINE FUMARATE 75 MILLIGRAM(S): 200 TABLET, FILM COATED ORAL at 12:33

## 2019-10-01 NOTE — PROGRESS NOTE ADULT - PROBLEM SELECTOR PLAN 9
F: none  E: replete prn  N: diabetic diet    PPx:  Dispo: [facility] then [facility]

## 2019-10-01 NOTE — PROGRESS NOTE BEHAVIORAL HEALTH - AXIS III
HTN, CAD s/p CABG, DM2, SUSY, CLL, spinal stenosis, obesity, diabetic neuropathy

## 2019-10-01 NOTE — PROGRESS NOTE BEHAVIORAL HEALTH - NSBHFUPINTERVALCCFT_PSY_A_CORE
"I need to be in an adult home"
"I feel better with the Seroquel"
"I feel better"
"I want to go to assisted living or a nursing home"

## 2019-10-01 NOTE — PROGRESS NOTE ADULT - PROBLEM SELECTOR PLAN 7
- On Toprol  mg QD, norvasc 5 mg QD

## 2019-10-01 NOTE — PROGRESS NOTE ADULT - SUBJECTIVE AND OBJECTIVE BOX
Physical Medicine and Rehabilitation Progress Note:    Patient is a 74y old  Male who presents with a chief complaint of Psych (30 Sep 2019 15:24)      HPI:  Pt is a 73 y/o M w/ pmhx of CLL (receiving rituxan started about 1 week ago next tx due 10/1), schizoaffective disorder?, HTN, DM, CAD s/p CABG in 2004 and 2 stents placed 6 months ago, HLD, peripheral neuropathy presenting with complaints feelings of increased depression and states he is unable to take care of himself anymore. Pt reports he has been feeling lonely, not wanting to get out of bed and has had decreased appetite stating he has lost 20 lbs. Pt had called EMS to bring him into the hospital. He states he does not want to go back to his home because there is no one there to help take care of him. He reports he has no family or friends and is requesting to be placed in an assisted living facility or nursing home. At baseline, he states he uses a cane to get around, but has been having more difficulty getting around. He denies any chest pain, SOB, fever, chills, abd pain, N/V/D. He does mention he has some chronic left shoulder pain that's due to a rotator cuff tear years ago.     In ED, T 98.2, HR 79, /98, RR 18, O2 100% on RA. Labs wbc 10.8, EKG NSR w/ T wave abnormalities in lateral leads. Pt assessed by psych and given Seroquel 50 mg. (27 Sep 2019 13:16)                            14.4   11.44 )-----------( 196      ( 30 Sep 2019 06:23 )             47.5       09-30    138  |  103  |  13  ----------------------------<  201<H>  4.3   |  23  |  0.65    Ca    8.9      30 Sep 2019 06:23  Mg     1.7     09-30      Vital Signs Last 24 Hrs  T(C): 36.7 (01 Oct 2019 08:22), Max: 36.8 (01 Oct 2019 04:56)  T(F): 98 (01 Oct 2019 08:22), Max: 98.3 (01 Oct 2019 04:56)  HR: 83 (01 Oct 2019 08:22) (74 - 88)  BP: 125/71 (01 Oct 2019 08:22) (125/71 - 126/70)  BP(mean): --  RR: 18 (01 Oct 2019 08:22) (18 - 18)  SpO2: 96% (01 Oct 2019 08:22) (95% - 96%)    MEDICATIONS  (STANDING):  amLODIPine   Tablet 5 milliGRAM(s) Oral daily  aspirin enteric coated 81 milliGRAM(s) Oral daily  atorvastatin 80 milliGRAM(s) Oral at bedtime  clopidogrel Tablet 75 milliGRAM(s) Oral daily  dextrose 5%. 1000 milliLiter(s) (50 mL/Hr) IV Continuous <Continuous>  dextrose 50% Injectable 12.5 Gram(s) IV Push once  dextrose 50% Injectable 25 Gram(s) IV Push once  dextrose 50% Injectable 25 Gram(s) IV Push once  gabapentin 600 milliGRAM(s) Oral three times a day  insulin lispro (HumaLOG) corrective regimen sliding scale   SubCutaneous Before meals and at bedtime  metoprolol succinate  milliGRAM(s) Oral daily  QUEtiapine 75 milliGRAM(s) Oral daily    MEDICATIONS  (PRN):  dextrose 40% Gel 15 Gram(s) Oral once PRN Blood Glucose LESS THAN 70 milliGRAM(s)/deciliter  glucagon  Injectable 1 milliGRAM(s) IntraMuscular once PRN Glucose LESS THAN 70 milligrams/deciliter    Currently Undergoing Physical Therapy at bedside.    Functional Status Assessment:      Therapeutic Interventions      Bed Mobility  Bed Mobility Training Rolling/Turning: independent  Bed Mobility Training Scooting: independent  Bed Mobility Training Supine-to-Sit: independent  Bed Mobility Training Limitations: cognitive, decreased safety awareness    Sit-Stand Transfer Training  Transfer Training Sit-to-Stand Transfer: supervision  Transfer Training Stand-to-Sit Transfer: supervision  Sit-to-Stand Transfer Training Transfer Safety Analysis: cognitive, decreased safety awareness    Toilet Transfer Training  Toilet Transfer Training Rehab Potential: good, to achieve stated therapy goals  Toilet Transfer Training Symptoms Noted During/After Treatment: none  Transfer Training Toilet Transfer: supervision  Toilet Transfer Training Transfer Safety Analysis: cognitive, decreased safety awareness    Gait Training  Gait Training Rehab Potential: good, to achieve stated therapy goals  Gait Training Symptoms Noted During/After Treatment: none  Gait Training: supervsion;  no AD;  25 feet  Gait Analysis: steady, shuffling gait, patient refused to participate further in therapy, screaming "go away" and slammed door, however upon observation to ambulating to bathroom, fairly steady, dec step clearance;  cognitive, decreased safety awareness            PM&R Impression: as above    Disposition Plan Recommendations: d/c home
SUBJECTIVE / INTERVAL HPI: Patient seen and examined at bedside. No acute events overnight. Patient was agigtated and walking the hallway screaming he wants to talk to social work. This afternoon also extremely agitated that no one understand his situation, and was asking repeatedly for social work to appeal his discharge. Patient denies any chest pain or SOB. Says his only problem is being able to take care of himself at home.     VITAL SIGNS:  Vital Signs Last 24 Hrs  T(C): 36.7 (01 Oct 2019 08:22), Max: 36.8 (01 Oct 2019 04:56)  T(F): 98 (01 Oct 2019 08:22), Max: 98.3 (01 Oct 2019 04:56)  HR: 83 (01 Oct 2019 08:22) (74 - 88)  BP: 125/71 (01 Oct 2019 08:22) (125/71 - 126/70)  BP(mean): --  RR: 18 (01 Oct 2019 08:22) (18 - 18)  SpO2: 96% (01 Oct 2019 08:22) (95% - 96%)    REVIEW OF SYSTEMS:  Negative unless indicated above.     PHYSICAL EXAM:  When asked for physical exam, "patient refused and said to come again later."    MEDICATIONS:  MEDICATIONS  (STANDING):  amLODIPine   Tablet 5 milliGRAM(s) Oral daily  aspirin enteric coated 81 milliGRAM(s) Oral daily  atorvastatin 80 milliGRAM(s) Oral at bedtime  clopidogrel Tablet 75 milliGRAM(s) Oral daily  dextrose 5%. 1000 milliLiter(s) (50 mL/Hr) IV Continuous <Continuous>  dextrose 50% Injectable 12.5 Gram(s) IV Push once  dextrose 50% Injectable 25 Gram(s) IV Push once  dextrose 50% Injectable 25 Gram(s) IV Push once  gabapentin 600 milliGRAM(s) Oral three times a day  insulin lispro (HumaLOG) corrective regimen sliding scale   SubCutaneous Before meals and at bedtime  metoprolol succinate  milliGRAM(s) Oral daily  QUEtiapine 75 milliGRAM(s) Oral daily    MEDICATIONS  (PRN):  dextrose 40% Gel 15 Gram(s) Oral once PRN Blood Glucose LESS THAN 70 milliGRAM(s)/deciliter  glucagon  Injectable 1 milliGRAM(s) IntraMuscular once PRN Glucose LESS THAN 70 milligrams/deciliter      ALLERGIES:  Allergies    No Known Allergies    Intolerances        LABS:                        14.4   11.44 )-----------( 196      ( 30 Sep 2019 06:23 )             47.5     09-30    138  |  103  |  13  ----------------------------<  201<H>  4.3   |  23  |  0.65    Ca    8.9      30 Sep 2019 06:23  Mg     1.7     09-30          CAPILLARY BLOOD GLUCOSE      POCT Blood Glucose.: 148 mg/dL (01 Oct 2019 12:36)      RADIOLOGY & ADDITIONAL TESTS: Reviewed.
SUBJECTIVE / INTERVAL HPI: Patient seen and examined at bedside. No acute events overnight. Patient was eating breakfast this morning in no acute distress, states that he feels better since coming into the hospital. Denies any suicidal or homicidal ideation.     VITAL SIGNS:  Vital Signs Last 24 Hrs  T(C): 36.4 (28 Sep 2019 05:02), Max: 37 (27 Sep 2019 20:33)  T(F): 97.5 (28 Sep 2019 05:02), Max: 98.6 (27 Sep 2019 20:33)  HR: 79 (28 Sep 2019 05:02) (79 - 99)  BP: 161/79 (28 Sep 2019 05:02) (133/77 - 179/85)  BP(mean): --  RR: 16 (28 Sep 2019 05:02) (16 - 18)  SpO2: 96% (28 Sep 2019 05:02) (96% - 98%)    REVIEW OF SYSTEMS:    CONSTITUTIONAL: No weakness, fevers or chills.   EYES/ENT: No visual changes;  No vertigo or throat pain   NECK: No pain or stiffness  RESPIRATORY: No cough, wheezing, hemoptysis; No shortness of breath  CARDIOVASCULAR: No chest pain or palpitations  GASTROINTESTINAL: No abdominal or epigastric pain. No nausea, vomiting, or hematemesis; No diarrhea or constipation. No melena or hematochezia.  GENITOURINARY: No dysuria, frequency or hematuria  NEUROLOGICAL: No numbness or weakness  SKIN: No itching, burning, rashes, or lesions   All other review of systems is negative unless indicated above.    PHYSICAL EXAM:  General: WDWN  HEENT: NC/AT; PERRL, clear conjunctiva  Neck: supple, no JVD,   Cardiovascular: +S1/S2; RRR, no M/R/G  Respiratory: CTA b/l; no W/R/R  Gastrointestinal: soft, NT/ND; +BSx4  Extremities: WWP; 2+ peripheral pulses; no edema   Neurological: AAOx3; no focal deficits, loud speech     MEDICATIONS:  MEDICATIONS  (STANDING):  amLODIPine   Tablet 5 milliGRAM(s) Oral daily  aspirin enteric coated 81 milliGRAM(s) Oral daily  atorvastatin 80 milliGRAM(s) Oral at bedtime  clopidogrel Tablet 75 milliGRAM(s) Oral daily  dextrose 5%. 1000 milliLiter(s) (50 mL/Hr) IV Continuous <Continuous>  dextrose 50% Injectable 12.5 Gram(s) IV Push once  dextrose 50% Injectable 25 Gram(s) IV Push once  dextrose 50% Injectable 25 Gram(s) IV Push once  gabapentin 600 milliGRAM(s) Oral three times a day  insulin lispro (HumaLOG) corrective regimen sliding scale   SubCutaneous Before meals and at bedtime  metoprolol succinate  milliGRAM(s) Oral daily  QUEtiapine 50 milliGRAM(s) Oral daily    MEDICATIONS  (PRN):  dextrose 40% Gel 15 Gram(s) Oral once PRN Blood Glucose LESS THAN 70 milliGRAM(s)/deciliter  glucagon  Injectable 1 milliGRAM(s) IntraMuscular once PRN Glucose LESS THAN 70 milligrams/deciliter      ALLERGIES:  Allergies    No Known Allergies    Intolerances        LABS:                        15.0   10.80 )-----------( 228      ( 27 Sep 2019 08:16 )             48.3     09-27    141  |  105  |  12  ----------------------------<  213<H>  4.1   |  25  |  0.67    Ca    9.3      27 Sep 2019 08:16    TPro  7.3  /  Alb  4.1  /  TBili  0.7  /  DBili  x   /  AST  14  /  ALT  12  /  AlkPhos  93  09-27        CAPILLARY BLOOD GLUCOSE      POCT Blood Glucose.: 203 mg/dL (28 Sep 2019 09:15)      RADIOLOGY & ADDITIONAL TESTS: Reviewed.
SUBJECTIVE / INTERVAL HPI: Patient seen and examined at bedside. No acute events overnight. This morning, patient was extremely agitated, pacing up and down the hallway saying that he does not want to leave the hospital, he wants to stay here and get chemotherapy done. He does not want to go to rehab either. He was very upset.     VITAL SIGNS:  Vital Signs Last 24 Hrs  T(C): 36.4 (30 Sep 2019 08:45), Max: 36.7 (29 Sep 2019 16:32)  T(F): 97.5 (30 Sep 2019 08:45), Max: 98.1 (29 Sep 2019 16:32)  HR: 73 (30 Sep 2019 08:45) (72 - 78)  BP: 125/68 (30 Sep 2019 08:45) (121/66 - 133/73)  BP(mean): --  RR: 18 (30 Sep 2019 08:45) (16 - 18)  SpO2: 98% (30 Sep 2019 08:45) (95% - 98%)    REVIEW OF SYSTEMS:  Negative unless indicated above.     PHYSICAL EXAM:  Did not participate for a physical exam     MEDICATIONS:  MEDICATIONS  (STANDING):  amLODIPine   Tablet 5 milliGRAM(s) Oral daily  aspirin enteric coated 81 milliGRAM(s) Oral daily  atorvastatin 80 milliGRAM(s) Oral at bedtime  clopidogrel Tablet 75 milliGRAM(s) Oral daily  dextrose 5%. 1000 milliLiter(s) (50 mL/Hr) IV Continuous <Continuous>  dextrose 50% Injectable 12.5 Gram(s) IV Push once  dextrose 50% Injectable 25 Gram(s) IV Push once  dextrose 50% Injectable 25 Gram(s) IV Push once  enoxaparin Injectable 40 milliGRAM(s) SubCutaneous once  gabapentin 600 milliGRAM(s) Oral three times a day  insulin lispro (HumaLOG) corrective regimen sliding scale   SubCutaneous Before meals and at bedtime  metoprolol succinate  milliGRAM(s) Oral daily  QUEtiapine 75 milliGRAM(s) Oral daily    MEDICATIONS  (PRN):  dextrose 40% Gel 15 Gram(s) Oral once PRN Blood Glucose LESS THAN 70 milliGRAM(s)/deciliter  glucagon  Injectable 1 milliGRAM(s) IntraMuscular once PRN Glucose LESS THAN 70 milligrams/deciliter      ALLERGIES:  Allergies    No Known Allergies    Intolerances        LABS:                        14.4   11.44 )-----------( 196      ( 30 Sep 2019 06:23 )             47.5     09-30    138  |  103  |  13  ----------------------------<  201<H>  4.3   |  23  |  0.65    Ca    8.9      30 Sep 2019 06:23  Mg     1.7     09-30          CAPILLARY BLOOD GLUCOSE      POCT Blood Glucose.: 202 mg/dL (30 Sep 2019 08:23)      RADIOLOGY & ADDITIONAL TESTS: Reviewed.
OVERNIGHT EVENTS: NAEO    SUBJECTIVE / INTERVAL HPI: Patient seen and examined at bedside. Pt reports that he is doing well and wants to be placed at nursing home. Asks about his chemotherapy treatments. Otherwise, pt is doing well and is without complaints.     VITAL SIGNS:  Vital Signs Last 24 Hrs  T(C): 36.3 (29 Sep 2019 09:27), Max: 36.7 (28 Sep 2019 16:36)  T(F): 97.3 (29 Sep 2019 09:27), Max: 98.1 (28 Sep 2019 16:36)  HR: 74 (29 Sep 2019 09:27) (69 - 82)  BP: 155/80 (29 Sep 2019 09:27) (146/74 - 163/82)  BP(mean): --  RR: 15 (29 Sep 2019 09:27) (14 - 18)  SpO2: 98% (29 Sep 2019 09:27) (96% - 99%)    PHYSICAL EXAM:    General: WDWN  HEENT: NC/AT; PERRL, anicteric sclera; MMM  Neck: supple  Cardiovascular: +S1/S2; RRR  Respiratory: CTA B/L; no W/R/R  Gastrointestinal: soft, NT, mildly distended, +BSx4  Extremities: WWP; no edema, clubbing or cyanosis  Vascular: 2+ radial, DP/PT pulses B/L  Neurological: AAOx3; no focal deficits, loud speech    MEDICATIONS:  MEDICATIONS  (STANDING):  amLODIPine   Tablet 5 milliGRAM(s) Oral daily  aspirin enteric coated 81 milliGRAM(s) Oral daily  atorvastatin 80 milliGRAM(s) Oral at bedtime  clopidogrel Tablet 75 milliGRAM(s) Oral daily  dextrose 5%. 1000 milliLiter(s) (50 mL/Hr) IV Continuous <Continuous>  dextrose 50% Injectable 12.5 Gram(s) IV Push once  dextrose 50% Injectable 25 Gram(s) IV Push once  dextrose 50% Injectable 25 Gram(s) IV Push once  gabapentin 600 milliGRAM(s) Oral three times a day  insulin lispro (HumaLOG) corrective regimen sliding scale   SubCutaneous Before meals and at bedtime  metoprolol succinate  milliGRAM(s) Oral daily  QUEtiapine 75 milliGRAM(s) Oral daily    MEDICATIONS  (PRN):  dextrose 40% Gel 15 Gram(s) Oral once PRN Blood Glucose LESS THAN 70 milliGRAM(s)/deciliter  glucagon  Injectable 1 milliGRAM(s) IntraMuscular once PRN Glucose LESS THAN 70 milligrams/deciliter      ALLERGIES:  Allergies    No Known Allergies    Intolerances        LABS:                        15.6   11.96 )-----------( 221      ( 29 Sep 2019 06:14 )             51.8     09-29    139  |  104  |  9   ----------------------------<  223<H>  4.3   |  24  |  0.69    Ca    9.2      29 Sep 2019 06:14  Phos  4.0     09-28  Mg     1.6     09-29          CAPILLARY BLOOD GLUCOSE      POCT Blood Glucose.: 211 mg/dL (29 Sep 2019 08:28)      RADIOLOGY & ADDITIONAL TESTS: Reviewed.

## 2019-10-01 NOTE — PROGRESS NOTE ADULT - PROBLEM SELECTOR PLAN 1
- Pt w/ hx of schizoaffective disorder and was evaluated by psych in the ED and said either SAD or schizophrenia. He had initially presented to hospital due to thoughts of hurting himself, feelings of depression, not wanting to get out of bed or eat. He called EMS to bring him to hospital  - He states he is unable to take care of himself, does not want to go back home and wants to be placed in an assisted living or nursing home  - psych recs but most likely pt can follow up with lexi outpatient after being placed in [facility]  - Pt on seroquel 200 mg BID at home. Will start on seroquel 75 mg here per psych
- Pt w/ hx of schizoaffective disorder and was evaluated by psych in the ED and said either SAD or schizophrenia. He had initially presented to hospital due to thoughts of hurting himself, feelings of depression, not wanting to get out of bed or eat. He called EMS to bring him to hospital  - He states he is unable to take care of himself, does not want to go back home and wants to be placed in an assisted living or nursing home  - psych said pt can follow up with lexi   - Pt on seroquel 200 mg BID at home. Will start on seroquel 75 mg here per psych  - Psych states that suicidal statements are within the secondary gain of wanting to stay in the hospital
- Pt w/ hx of schizoaffective disorder and was evauluated by psych in the ED and said either SAD or schizophrenia. He had initially presented to hospital due to thoughts of hurting himself, feelings of depression, not wanting to get out of bed or eat. He called EMS to bring him to hospital  - He states he is unable to take care of himself, does not want to go back home and wants to be placed in an assisted living or nursing home  - psych recs but most likely pt can follow up with lexi outpatient after being placed in [facility]  - Pt on seroquel 200 mg BID at home. Will start on seroquel 50 mg here per psych
- Pt w/ hx of schizoaffective disorder and was evaluated by psych in the ED and said either SAD or schizophrenia. He had initially presented to hospital due to thoughts of hurting himself, feelings of depression, not wanting to get out of bed or eat. He called EMS to bring him to hospital  - He states he is unable to take care of himself, does not want to go back home and wants to be placed in an assisted living or nursing home  - psych recs but most likely pt can follow up with lexi outpatient after being placed in [facility]  - Pt on seroquel 200 mg BID at home. Will start on seroquel 50 mg here per psych

## 2019-10-01 NOTE — PROGRESS NOTE ADULT - PROBLEM SELECTOR PLAN 2
- Pt reports generalized weakness with complaints of not being able to take care of himself  - States it is hard for him to get around with a cane  - Likely due to overall deconditioning, CLL, no evidence of any infection, strength is good on exam.  - PT recommends [facility]URY, appreciate OT recs.   - re-evaluation by PT as pt seems to be able to walk around
- Pt reports generalized weakness with complaints of not being able to take care of himself  - States it is hard for him to get around with a cane  - Likely due to overall deconditioning, CLL, no evidence of any infection, strength is good on exam.  - PT recommends [facility], appreciate OT recs.   - Plan for pt to go to [facility]
- Pt reports generalized weakness with complaints of not being able to take care of himself  - States it is hard for him to get around with a cane  - Likely due to overall deconditioning, CLL, no evidence of any infection, strength is good on exam.  - PT says safe for home discharge
- Pt reports generalized weakness with complaints of not being able to take care of himself  - States it is hard for him to get around with a cane  - Likely due to overall deconditioning, CLL, no evidence of any infection, strength is good on exam.  - PT recommends AURY, appreciate OT recs.   - re-evaluation by PT as pt seems to be able to walk around  - Plan for pt to go to [facility]

## 2019-10-01 NOTE — PROGRESS NOTE ADULT - ASSESSMENT
Pt is a 73 y/o M w/ pmhx of CLL (receiving rituxan started about 1 week ago next tx due 10/1), schizoaffective disorder?, HTN, DM, CAD s/p CABG in 2004 and 2 stents placed 6 months ago, HLD, peripheral neuropathy presenting with complaints feelings of increased depression and states he is unable to take care of himself anymore. Pt admitted for AURY placement.
Pt is a 75 y/o M w/ pmhx of CLL (receiving rituxan started about 1 week ago next tx due 10/1), schizoaffective disorder?, HTN, DM, CAD s/p CABG in 2004 and 2 stents placed 6 months ago, HLD, peripheral neuropathy presenting with complaints feelings of increased depression and states he is unable to take care of himself anymore. PT determined that he can go home.
per Internal Medicine    Pt is a 73 y/o M w/ pmhx of CLL (receiving rituxan started about 1 week ago next tx due 10/1), schizoaffective disorder?, HTN, DM, CAD s/p CABG in 2004 and 2 stents placed 6 months ago, HLD, peripheral neuropathy presenting with complaints feelings of increased depression and states he is unable to take care of himself anymore.     Problem/Plan - 1:  ·  Problem: Suicidal behavior.  Plan: - Pt w/ hx of schizoaffective disorder and was evaluated by psych in the ED and said either SAD or schizophrenia. He had initially presented to hospital due to thoughts of hurting himself, feelings of depression, not wanting to get out of bed or eat. He called EMS to bring him to hospital  - He states he is unable to take care of himself, does not want to go back home and wants to be placed in an assisted living or nursing home  - psych said pt can follow up with pysch   - Pt on seroquel 200 mg BID at home. Will start on seroquel 75 mg here per psych  - Psych states that suicidal statements are within the secondary gain of wanting to stay in the hospital.     Problem/Plan - 2:  ·  Problem: Weakness.  Plan: - Pt reports generalized weakness with complaints of not being able to take care of himself  - States it is hard for him to get around with a cane  - Likely due to overall deconditioning, CLL, no evidence of any infection, strength is good on exam.  - PT says safe for home discharge.     Problem/Plan - 3:  ·  Problem: CLL (chronic lymphocytic leukemia).  Plan: - Pt w/ CLL reportedly dx in 2012. Pt states he follows with Dr. Gallo, oncologist who started in him on Rituxan last week.    - f/u outpatient for Rituxan therapy, next dose is Friday, October 4th.     Problem/Plan - 4:  ·  Problem: CAD (coronary artery disease).  Plan: - Pt s/p CABG in 2004 and 2 stents 6 months ago  - Pt on asa 81 mg and plavix 75 mg.     Problem/Plan - 5:  ·  Problem: DM (diabetes mellitus).  Plan: - On metformin 1000 mg BID, glimepiride 4 mg, Januvia 100 mg, and Toujeo insulin  - on sliding scale while in hospital.     Problem/Plan - 6:  Problem: Peripheral neuropathy. Plan: - Pt reports hx of b/l peripheral neuropathy likely 2/2 to diabetes  - on gabapentin 600 mg TID.    Problem/Plan - 7:  ·  Problem: Hypertension.  Plan: - On Toprol  mg QD, norvasc 5 mg QD.     Problem/Plan - 8:  ·  Problem: Hyperlipidemia.  Plan: - on Crestor 40 mg QD.     Problem/Plan - 9:  ·  Problem: Nutrition, metabolism, and development symptoms.  Plan: F: none  E: replete prn  N: diabetic diet
Pt is a 75 y/o M w/ pmhx of CLL (receiving rituxan started about 1 week ago next tx due 10/1), schizoaffective disorder?, HTN, DM, CAD s/p CABG in 2004 and 2 stents placed 6 months ago, HLD, peripheral neuropathy presenting with complaints feelings of increased depression and states he is unable to take care of himself anymore. Pt admitted for AURY placement.
Pt is a 75 y/o M w/ pmhx of CLL (receiving rituxan started about 1 week ago next tx due 10/1), schizoaffective disorder?, HTN, DM, CAD s/p CABG in 2004 and 2 stents placed 6 months ago, HLD, peripheral neuropathy presenting with complaints feelings of increased depression and states he is unable to take care of himself anymore. Pt admitted for AURY placement.

## 2019-10-01 NOTE — PROGRESS NOTE BEHAVIORAL HEALTH - RISK ASSESSMENT
low: no history of SI/SA, future oriented  His prior reports of SI were in context of the secondary gain of staying in the hospital
low: no history of SI/SA, future oriented  His prior reports of SI were in context of the secondary gain of staying in the hospital

## 2019-10-01 NOTE — PROGRESS NOTE BEHAVIORAL HEALTH - NSBHATTESTSEENBY_PSY_A_CORE
attending Psychiatrist without NP/Trainee
Trainee with telephonic supervision from Attending Psychiatrist

## 2019-10-01 NOTE — PROGRESS NOTE BEHAVIORAL HEALTH - NSBHADMITCOUNSEL_PSY_A_CORE
importance of adherence to chosen treatment/diagnostic results/impressions and/or recommended studies
diagnostic results/impressions and/or recommended studies/importance of adherence to chosen treatment

## 2019-10-01 NOTE — PROGRESS NOTE ADULT - PROBLEM SELECTOR PLAN 3
- Pt w/ CLL reportedly dx in 2012. Pt states he follows with Dr. Gallo, oncologist who started in him on Rituxan last week.   - Spoke with Dr. Woodson who is covering for Dr. Gallo who reports that pt is due for his next infusion of Rituxan on 10/1 and does not require inpatient therapy. Also, notes that there were plans to start pt on ibrutinib 420 mg but it is not clear if pt has started it yet  - f/u outpatient for Rituxan thearpy
- Pt w/ CLL reportedly dx in 2012. Pt states he follows with Dr. Gallo, oncologist who started in him on Rituxan last week.    - f/u outpatient for Rituxan therapy, next dose is Friday, October 4th
- Pt w/ CLL reportedly dx in 2012. Pt states he follows with Dr. Gallo, oncologist who started in him on Rituxan last week.   - Spoke with Dr. Woodson who is covering for Dr. Gallo who reports that pt is due for his next infusion of Rituxan on 10/1 and does not require inpatient therapy. Also, notes that there were plans to start pt on ibrutinib 420 mg but it is not clear if pt has started it yet
- Pt w/ CLL reportedly dx in 2012. Pt states he follows with Dr. Gallo, oncologist who started in him on Rituxan last week.   - Spoke with Dr. Woodson who is covering for Dr. Gallo who reports that pt is due for his next infusion of Rituxan on 10/1 and does not require inpatient therapy. Also, notes that there were plans to start pt on ibrutinib 420 mg but it is not clear if pt has started it yet  - f/u outpatient for Rituxan thearpy

## 2019-10-01 NOTE — PROGRESS NOTE ADULT - PROBLEM SELECTOR PLAN 6
- Pt reports hx of b/l peripheral neuropathy likely 2/2 to diabetes  - on gabapentin 600 mg TID

## 2019-10-01 NOTE — PROGRESS NOTE BEHAVIORAL HEALTH - NSBHCHARTREVIEWVS_PSY_A_CORE FT
Vital Signs Last 24 Hrs  T(C): 36.7 (01 Oct 2019 08:22), Max: 36.8 (01 Oct 2019 04:56)  T(F): 98 (01 Oct 2019 08:22), Max: 98.3 (01 Oct 2019 04:56)  HR: 83 (01 Oct 2019 08:22) (74 - 88)  BP: 125/71 (01 Oct 2019 08:22) (125/71 - 126/70)  BP(mean): --  RR: 18 (01 Oct 2019 08:22) (18 - 18)  SpO2: 96% (01 Oct 2019 08:22) (95% - 96%)
Vital Signs Last 24 Hrs  T(C): 36.3 (29 Sep 2019 09:27), Max: 36.7 (28 Sep 2019 16:36)  T(F): 97.3 (29 Sep 2019 09:27), Max: 98.1 (28 Sep 2019 16:36)  HR: 74 (29 Sep 2019 09:27) (69 - 77)  BP: 155/80 (29 Sep 2019 09:27) (155/80 - 163/82)  BP(mean): --  RR: 15 (29 Sep 2019 09:27) (15 - 18)  SpO2: 98% (29 Sep 2019 09:27) (96% - 99%)
Vital Signs Last 24 Hrs  T(C): 36.6 (28 Sep 2019 10:10), Max: 37 (27 Sep 2019 20:33)  T(F): 97.9 (28 Sep 2019 10:10), Max: 98.6 (27 Sep 2019 20:33)  HR: 82 (28 Sep 2019 10:10) (79 - 99)  BP: 146/74 (28 Sep 2019 10:10) (146/74 - 179/85)  BP(mean): --  RR: 14 (28 Sep 2019 10:10) (14 - 18)  SpO2: 97% (28 Sep 2019 10:10) (96% - 98%)
Vital Signs Last 24 Hrs  T(C): 36.4 (30 Sep 2019 08:45), Max: 36.7 (29 Sep 2019 16:32)  T(F): 97.5 (30 Sep 2019 08:45), Max: 98.1 (29 Sep 2019 16:32)  HR: 73 (30 Sep 2019 08:45) (72 - 78)  BP: 125/68 (30 Sep 2019 08:45) (121/66 - 133/73)  BP(mean): --  RR: 18 (30 Sep 2019 08:45) (16 - 18)  SpO2: 98% (30 Sep 2019 08:45) (95% - 98%)

## 2019-10-01 NOTE — PROGRESS NOTE BEHAVIORAL HEALTH - SUMMARY
Pt is a 73yo SWM, domiciled alone with dx of Chronic Schizophrenia (vs Schizoaffective D/O?), multiple prior inpt psych admits incl [facility] (none since 2014), not currently in outpatient psychiatric treatment, hx/o SUSY, CAD s/p CABG, Type 2 DM, HTN, diabetic neuropathy, obesity, spinal stenosis, CLL (undergoing chemo therapy with Dr. Gallo [phone]), was BIBEMS after pt called 911 asking to be taken to the hospital. Patient currently presents with baseline irritability, no acute psychiatric symptoms. He is requesting placing to assisted living or a nursing home. He is not appropriate for a psychiatric admission.   Plan:  -patient is not appropriate for inpatient psychiatric hospitalization  -Increase Seroquel to 100mg po hs to address irritability

## 2019-10-01 NOTE — PROGRESS NOTE BEHAVIORAL HEALTH - PROBLEM SELECTOR PLAN 1
Increase Seroquel to 100 mg qhs  SW to set up SW in the community. Pt will benefit from supportive living placement as he often presents to ED with c/o being lonely and needing more support.

## 2019-10-01 NOTE — PROGRESS NOTE ADULT - PROBLEM SELECTOR PLAN 4
- Pt s/p CABG in 2004 and 2 stents 6 months ago  - Pt on asa 81 mg and plavix 75 mg

## 2019-10-01 NOTE — PROGRESS NOTE ADULT - PROBLEM SELECTOR PROBLEM 3
CLL (chronic lymphocytic leukemia)

## 2019-10-01 NOTE — PROGRESS NOTE BEHAVIORAL HEALTH - NSBHFUPINTERVALHXFT_PSY_A_CORE
Patient seen and bedside. Pt continues to perseverate on wish to be placed in supportive housing. "I need to be in a group home". Pt requests to speak with SW about placement options.   There is no evidence of acute psychotic or mood symptoms. No SI/HI
On interview patient reports that he is feeling better with addition of Seroquel to his med regimen, no longer having anxiety. Does note some anxiety about upcoming discussion with social work team about his disposition from the hospital. Otherwise denying any SI/HI/AVH. Reports having "hopeful thoughts." Sleeping and eating well.
Pt seen ,chart reviewed, case discussed with team.    As long as writer introduced herself, patient announced loudly "I feel better! comfortable! and safe!"  He reports that he was on Seroquel 100mg and now he is on a lower dose.  Although he is feeling better, he doesn't want to go back to where he were, meaning he felt like he was dying.      He requests to stay in the hospital for chemo, not being sent to his home because he can't take care of himself.  He requests assisted living or nursing home placement.
Patient seen and bedside. He presents at baseline, irritable but redirectible.  He reports feeling frustrated that he cannot be placed to assisted living or a nursing home as he wants. He states that he is tired of living by himself. He feels lonely as his neighbors don't speak to him and he does not have any family. He feels frustrated with his HHA who don't help him with his grocery shopping and cooking/cleaning ("they only sit on the couch and play on the phone all day long"). He reports normal sleep and appetite in the hospital. He denies any AVH. He denies Si. He states that he would like to go to [facility] where "there are better social workers and can place me in a nursing home".

## 2019-10-03 DIAGNOSIS — C91.10 CHRONIC LYMPHOCYTIC LEUKEMIA OF B-CELL TYPE NOT HAVING ACHIEVED REMISSION: ICD-10-CM

## 2019-10-03 DIAGNOSIS — F25.9 SCHIZOAFFECTIVE DISORDER, UNSPECIFIED: ICD-10-CM

## 2019-10-03 DIAGNOSIS — E11.40 TYPE 2 DIABETES MELLITUS WITH DIABETIC NEUROPATHY, UNSPECIFIED: ICD-10-CM

## 2019-10-03 DIAGNOSIS — Z79.82 LONG TERM (CURRENT) USE OF ASPIRIN: ICD-10-CM

## 2019-10-03 DIAGNOSIS — R45.851 SUICIDAL IDEATIONS: ICD-10-CM

## 2019-10-03 DIAGNOSIS — Z95.1 PRESENCE OF AORTOCORONARY BYPASS GRAFT: ICD-10-CM

## 2019-10-03 DIAGNOSIS — I10 ESSENTIAL (PRIMARY) HYPERTENSION: ICD-10-CM

## 2019-10-03 DIAGNOSIS — E44.0 MODERATE PROTEIN-CALORIE MALNUTRITION: ICD-10-CM

## 2019-10-03 DIAGNOSIS — E78.5 HYPERLIPIDEMIA, UNSPECIFIED: ICD-10-CM

## 2019-10-03 DIAGNOSIS — G47.33 OBSTRUCTIVE SLEEP APNEA (ADULT) (PEDIATRIC): ICD-10-CM

## 2019-10-07 RX ORDER — ACYCLOVIR SODIUM 500 MG
1 VIAL (EA) INTRAVENOUS
Qty: 0 | Refills: 0 | DISCHARGE
Start: 2019-10-07

## 2019-10-08 ENCOUNTER — INPATIENT (INPATIENT)
Facility: HOSPITAL | Age: 74
LOS: 2 days | Discharge: PSYCHIATRIC OR SUBSTANCE USE FACILITY | End: 2019-10-11
Attending: INTERNAL MEDICINE | Admitting: INTERNAL MEDICINE
Payer: MEDICARE

## 2019-10-08 VITALS
DIASTOLIC BLOOD PRESSURE: 81 MMHG | HEART RATE: 94 BPM | OXYGEN SATURATION: 98 % | TEMPERATURE: 98 F | RESPIRATION RATE: 16 BRPM | SYSTOLIC BLOOD PRESSURE: 179 MMHG

## 2019-10-08 DIAGNOSIS — F20.9 SCHIZOPHRENIA, UNSPECIFIED: ICD-10-CM

## 2019-10-08 DIAGNOSIS — F43.22 ADJUSTMENT DISORDER WITH ANXIETY: ICD-10-CM

## 2019-10-08 DIAGNOSIS — C91.10 CHRONIC LYMPHOCYTIC LEUKEMIA OF B-CELL TYPE NOT HAVING ACHIEVED REMISSION: ICD-10-CM

## 2019-10-08 DIAGNOSIS — I25.10 ATHEROSCLEROTIC HEART DISEASE OF NATIVE CORONARY ARTERY WITHOUT ANGINA PECTORIS: ICD-10-CM

## 2019-10-08 DIAGNOSIS — Z95.1 PRESENCE OF AORTOCORONARY BYPASS GRAFT: Chronic | ICD-10-CM

## 2019-10-08 DIAGNOSIS — E11.9 TYPE 2 DIABETES MELLITUS WITHOUT COMPLICATIONS: ICD-10-CM

## 2019-10-08 DIAGNOSIS — Z29.9 ENCOUNTER FOR PROPHYLACTIC MEASURES, UNSPECIFIED: ICD-10-CM

## 2019-10-08 DIAGNOSIS — I10 ESSENTIAL (PRIMARY) HYPERTENSION: ICD-10-CM

## 2019-10-08 LAB
ALBUMIN SERPL ELPH-MCNC: 4.2 G/DL — SIGNIFICANT CHANGE UP (ref 3.3–5)
ALP SERPL-CCNC: 106 U/L — SIGNIFICANT CHANGE UP (ref 40–120)
ALT FLD-CCNC: 11 U/L — SIGNIFICANT CHANGE UP (ref 4–41)
AMPHET UR-MCNC: NEGATIVE — SIGNIFICANT CHANGE UP
ANION GAP SERPL CALC-SCNC: 16 MMO/L — HIGH (ref 7–14)
ANISOCYTOSIS BLD QL: SLIGHT — SIGNIFICANT CHANGE UP
APAP SERPL-MCNC: < 15 UG/ML — LOW (ref 15–25)
APPEARANCE UR: CLEAR — SIGNIFICANT CHANGE UP
AST SERPL-CCNC: 15 U/L — SIGNIFICANT CHANGE UP (ref 4–40)
BARBITURATES UR SCN-MCNC: NEGATIVE — SIGNIFICANT CHANGE UP
BASOPHILS # BLD AUTO: 0.04 K/UL — SIGNIFICANT CHANGE UP (ref 0–0.2)
BASOPHILS NFR BLD AUTO: 0.4 % — SIGNIFICANT CHANGE UP (ref 0–2)
BASOPHILS NFR SPEC: 0 % — SIGNIFICANT CHANGE UP (ref 0–2)
BENZODIAZ UR-MCNC: NEGATIVE — SIGNIFICANT CHANGE UP
BILIRUB SERPL-MCNC: 0.8 MG/DL — SIGNIFICANT CHANGE UP (ref 0.2–1.2)
BILIRUB UR-MCNC: NEGATIVE — SIGNIFICANT CHANGE UP
BLASTS # FLD: 0 % — SIGNIFICANT CHANGE UP (ref 0–0)
BLOOD UR QL VISUAL: NEGATIVE — SIGNIFICANT CHANGE UP
BUN SERPL-MCNC: 11 MG/DL — SIGNIFICANT CHANGE UP (ref 7–23)
CALCIUM SERPL-MCNC: 9.3 MG/DL — SIGNIFICANT CHANGE UP (ref 8.4–10.5)
CANNABINOIDS UR-MCNC: NEGATIVE — SIGNIFICANT CHANGE UP
CHLORIDE SERPL-SCNC: 103 MMOL/L — SIGNIFICANT CHANGE UP (ref 98–107)
CO2 SERPL-SCNC: 19 MMOL/L — LOW (ref 22–31)
COCAINE METAB.OTHER UR-MCNC: NEGATIVE — SIGNIFICANT CHANGE UP
COLOR SPEC: YELLOW — SIGNIFICANT CHANGE UP
CREAT SERPL-MCNC: 0.72 MG/DL — SIGNIFICANT CHANGE UP (ref 0.5–1.3)
EOSINOPHIL # BLD AUTO: 0 K/UL — SIGNIFICANT CHANGE UP (ref 0–0.5)
EOSINOPHIL NFR BLD AUTO: 0 % — SIGNIFICANT CHANGE UP (ref 0–6)
EOSINOPHIL NFR FLD: 0 % — SIGNIFICANT CHANGE UP (ref 0–6)
ETHANOL BLD-MCNC: < 10 MG/DL — SIGNIFICANT CHANGE UP
GIANT PLATELETS BLD QL SMEAR: PRESENT — SIGNIFICANT CHANGE UP
GLUCOSE SERPL-MCNC: 311 MG/DL — HIGH (ref 70–99)
GLUCOSE UR-MCNC: >1000 — HIGH
HCT VFR BLD CALC: 51.5 % — HIGH (ref 39–50)
HGB BLD-MCNC: 15.5 G/DL — SIGNIFICANT CHANGE UP (ref 13–17)
HYPOCHROMIA BLD QL: SLIGHT — SIGNIFICANT CHANGE UP
IMM GRANULOCYTES NFR BLD AUTO: 0.4 % — SIGNIFICANT CHANGE UP (ref 0–1.5)
KETONES UR-MCNC: NEGATIVE — SIGNIFICANT CHANGE UP
LEUKOCYTE ESTERASE UR-ACNC: NEGATIVE — SIGNIFICANT CHANGE UP
LYMPHOCYTES # BLD AUTO: 0.44 K/UL — LOW (ref 1–3.3)
LYMPHOCYTES # BLD AUTO: 3.9 % — LOW (ref 13–44)
LYMPHOCYTES NFR SPEC AUTO: 0.9 % — LOW (ref 13–44)
MCHC RBC-ENTMCNC: 24.6 PG — LOW (ref 27–34)
MCHC RBC-ENTMCNC: 30.1 % — LOW (ref 32–36)
MCV RBC AUTO: 81.9 FL — SIGNIFICANT CHANGE UP (ref 80–100)
METAMYELOCYTES # FLD: 0 % — SIGNIFICANT CHANGE UP (ref 0–1)
METHADONE UR-MCNC: NEGATIVE — SIGNIFICANT CHANGE UP
MICROCYTES BLD QL: SLIGHT — SIGNIFICANT CHANGE UP
MONOCYTES # BLD AUTO: 0.32 K/UL — SIGNIFICANT CHANGE UP (ref 0–0.9)
MONOCYTES NFR BLD AUTO: 2.8 % — SIGNIFICANT CHANGE UP (ref 2–14)
MONOCYTES NFR BLD: 0 % — LOW (ref 2–9)
MYELOCYTES NFR BLD: 0 % — SIGNIFICANT CHANGE UP (ref 0–0)
NEUTROPHIL AB SER-ACNC: 96.5 % — HIGH (ref 43–77)
NEUTROPHILS # BLD AUTO: 10.48 K/UL — HIGH (ref 1.8–7.4)
NEUTROPHILS NFR BLD AUTO: 92.5 % — HIGH (ref 43–77)
NEUTS BAND # BLD: 0 % — SIGNIFICANT CHANGE UP (ref 0–6)
NITRITE UR-MCNC: NEGATIVE — SIGNIFICANT CHANGE UP
NRBC # FLD: 0 K/UL — SIGNIFICANT CHANGE UP (ref 0–0)
OPIATES UR-MCNC: NEGATIVE — SIGNIFICANT CHANGE UP
OTHER - HEMATOLOGY %: 0 — SIGNIFICANT CHANGE UP
OXYCODONE UR-MCNC: NEGATIVE — SIGNIFICANT CHANGE UP
PCP UR-MCNC: NEGATIVE — SIGNIFICANT CHANGE UP
PH UR: 6 — SIGNIFICANT CHANGE UP (ref 5–8)
PLATELET # BLD AUTO: 270 K/UL — SIGNIFICANT CHANGE UP (ref 150–400)
PLATELET COUNT - ESTIMATE: NORMAL — SIGNIFICANT CHANGE UP
PMV BLD: 9.7 FL — SIGNIFICANT CHANGE UP (ref 7–13)
POIKILOCYTOSIS BLD QL AUTO: SLIGHT — SIGNIFICANT CHANGE UP
POLYCHROMASIA BLD QL SMEAR: SLIGHT — SIGNIFICANT CHANGE UP
POTASSIUM SERPL-MCNC: 4.2 MMOL/L — SIGNIFICANT CHANGE UP (ref 3.5–5.3)
POTASSIUM SERPL-SCNC: 4.2 MMOL/L — SIGNIFICANT CHANGE UP (ref 3.5–5.3)
PROMYELOCYTES # FLD: 0 % — SIGNIFICANT CHANGE UP (ref 0–0)
PROT SERPL-MCNC: 7.4 G/DL — SIGNIFICANT CHANGE UP (ref 6–8.3)
PROT UR-MCNC: 20 — SIGNIFICANT CHANGE UP
RBC # BLD: 6.29 M/UL — HIGH (ref 4.2–5.8)
RBC # FLD: 21.4 % — HIGH (ref 10.3–14.5)
RBC CASTS # UR COMP ASSIST: SIGNIFICANT CHANGE UP (ref 0–?)
SALICYLATES SERPL-MCNC: < 5 MG/DL — LOW (ref 15–30)
SODIUM SERPL-SCNC: 138 MMOL/L — SIGNIFICANT CHANGE UP (ref 135–145)
SP GR SPEC: 1.01 — SIGNIFICANT CHANGE UP (ref 1–1.04)
TSH SERPL-MCNC: 1.12 UIU/ML — SIGNIFICANT CHANGE UP (ref 0.27–4.2)
UROBILINOGEN FLD QL: NORMAL — SIGNIFICANT CHANGE UP
VARIANT LYMPHS # BLD: 2.6 % — SIGNIFICANT CHANGE UP
WBC # BLD: 11.32 K/UL — HIGH (ref 3.8–10.5)
WBC # FLD AUTO: 11.32 K/UL — HIGH (ref 3.8–10.5)
WBC UR QL: SIGNIFICANT CHANGE UP (ref 0–?)

## 2019-10-08 PROCEDURE — 99285 EMERGENCY DEPT VISIT HI MDM: CPT

## 2019-10-08 PROCEDURE — 99223 1ST HOSP IP/OBS HIGH 75: CPT

## 2019-10-08 RX ORDER — ENOXAPARIN SODIUM 100 MG/ML
40 INJECTION SUBCUTANEOUS DAILY
Refills: 0 | Status: DISCONTINUED | OUTPATIENT
Start: 2019-10-08 | End: 2019-10-11

## 2019-10-08 RX ORDER — INSULIN GLARGINE 100 [IU]/ML
0 INJECTION, SOLUTION SUBCUTANEOUS
Qty: 0 | Refills: 0 | DISCHARGE

## 2019-10-08 RX ORDER — DEXTROSE 50 % IN WATER 50 %
25 SYRINGE (ML) INTRAVENOUS ONCE
Refills: 0 | Status: DISCONTINUED | OUTPATIENT
Start: 2019-10-08 | End: 2019-10-11

## 2019-10-08 RX ORDER — DEXTROSE 50 % IN WATER 50 %
15 SYRINGE (ML) INTRAVENOUS ONCE
Refills: 0 | Status: DISCONTINUED | OUTPATIENT
Start: 2019-10-08 | End: 2019-10-11

## 2019-10-08 RX ORDER — AMLODIPINE BESYLATE 2.5 MG/1
5 TABLET ORAL DAILY
Refills: 0 | Status: DISCONTINUED | OUTPATIENT
Start: 2019-10-08 | End: 2019-10-11

## 2019-10-08 RX ORDER — ASPIRIN/CALCIUM CARB/MAGNESIUM 324 MG
81 TABLET ORAL DAILY
Refills: 0 | Status: DISCONTINUED | OUTPATIENT
Start: 2019-10-08 | End: 2019-10-11

## 2019-10-08 RX ORDER — QUETIAPINE FUMARATE 200 MG/1
100 TABLET, FILM COATED ORAL AT BEDTIME
Refills: 0 | Status: DISCONTINUED | OUTPATIENT
Start: 2019-10-08 | End: 2019-10-11

## 2019-10-08 RX ORDER — SODIUM CHLORIDE 9 MG/ML
1000 INJECTION, SOLUTION INTRAVENOUS
Refills: 0 | Status: DISCONTINUED | OUTPATIENT
Start: 2019-10-08 | End: 2019-10-11

## 2019-10-08 RX ORDER — DEXTROSE 50 % IN WATER 50 %
12.5 SYRINGE (ML) INTRAVENOUS ONCE
Refills: 0 | Status: DISCONTINUED | OUTPATIENT
Start: 2019-10-08 | End: 2019-10-11

## 2019-10-08 RX ORDER — GABAPENTIN 400 MG/1
600 CAPSULE ORAL THREE TIMES A DAY
Refills: 0 | Status: DISCONTINUED | OUTPATIENT
Start: 2019-10-08 | End: 2019-10-11

## 2019-10-08 RX ORDER — VALACYCLOVIR 500 MG/1
1 TABLET, FILM COATED ORAL
Qty: 0 | Refills: 0 | DISCHARGE

## 2019-10-08 RX ORDER — METOPROLOL TARTRATE 50 MG
100 TABLET ORAL DAILY
Refills: 0 | Status: DISCONTINUED | OUTPATIENT
Start: 2019-10-08 | End: 2019-10-11

## 2019-10-08 RX ORDER — INSULIN LISPRO 100/ML
VIAL (ML) SUBCUTANEOUS AT BEDTIME
Refills: 0 | Status: DISCONTINUED | OUTPATIENT
Start: 2019-10-08 | End: 2019-10-11

## 2019-10-08 RX ORDER — INSULIN HUMAN 100 [IU]/ML
5 INJECTION, SOLUTION SUBCUTANEOUS ONCE
Refills: 0 | Status: COMPLETED | OUTPATIENT
Start: 2019-10-08 | End: 2019-10-08

## 2019-10-08 RX ORDER — CLOPIDOGREL BISULFATE 75 MG/1
75 TABLET, FILM COATED ORAL DAILY
Refills: 0 | Status: DISCONTINUED | OUTPATIENT
Start: 2019-10-08 | End: 2019-10-11

## 2019-10-08 RX ORDER — QUETIAPINE FUMARATE 200 MG/1
50 TABLET, FILM COATED ORAL EVERY 6 HOURS
Refills: 0 | Status: DISCONTINUED | OUTPATIENT
Start: 2019-10-08 | End: 2019-10-10

## 2019-10-08 RX ORDER — GLUCAGON INJECTION, SOLUTION 0.5 MG/.1ML
1 INJECTION, SOLUTION SUBCUTANEOUS ONCE
Refills: 0 | Status: DISCONTINUED | OUTPATIENT
Start: 2019-10-08 | End: 2019-10-11

## 2019-10-08 RX ORDER — ATORVASTATIN CALCIUM 80 MG/1
80 TABLET, FILM COATED ORAL AT BEDTIME
Refills: 0 | Status: DISCONTINUED | OUTPATIENT
Start: 2019-10-08 | End: 2019-10-11

## 2019-10-08 RX ORDER — INSULIN LISPRO 100/ML
VIAL (ML) SUBCUTANEOUS
Refills: 0 | Status: DISCONTINUED | OUTPATIENT
Start: 2019-10-08 | End: 2019-10-11

## 2019-10-08 RX ORDER — METFORMIN HYDROCHLORIDE 850 MG/1
1000 TABLET ORAL ONCE
Refills: 0 | Status: COMPLETED | OUTPATIENT
Start: 2019-10-08 | End: 2019-10-08

## 2019-10-08 RX ADMIN — QUETIAPINE FUMARATE 100 MILLIGRAM(S): 200 TABLET, FILM COATED ORAL at 21:03

## 2019-10-08 RX ADMIN — ATORVASTATIN CALCIUM 80 MILLIGRAM(S): 80 TABLET, FILM COATED ORAL at 21:02

## 2019-10-08 RX ADMIN — Medication 2 MILLIGRAM(S): at 17:30

## 2019-10-08 RX ADMIN — METFORMIN HYDROCHLORIDE 1000 MILLIGRAM(S): 850 TABLET ORAL at 18:37

## 2019-10-08 RX ADMIN — INSULIN HUMAN 5 UNIT(S): 100 INJECTION, SOLUTION SUBCUTANEOUS at 18:36

## 2019-10-08 NOTE — ED PROVIDER NOTE - PMH
Chronic leukemia in remission    DM (diabetes mellitus)  Type 2  HLD (hyperlipidemia)    Hypertension    Sleep apnea, unspecified type

## 2019-10-08 NOTE — ED BEHAVIORAL HEALTH ASSESSMENT NOTE - OTHER
Provider No current outpt psychiatric provider unable to assess On stretcher somewhat childlike Dramatic focused on inability to care for himself. pt does not appear internally preoccupied. Possibly over endorsing symptoms to gain admission No current outpt psychiatric provider but taking medications as prescribed by PMD Grossly normal but not fully evaluated, will defer to medicine On stretcher - states he ambulates with a cane at baseline focused on inability to care for himself pt does not appear internally preoccupied Appears limited, possibly borderline intellectual functioning Poor historian regarding medical care and recent admission Poor historian regarding medical care and hx of treatments

## 2019-10-08 NOTE — ED BEHAVIORAL HEALTH ASSESSMENT NOTE - NS ED BHA AXIS I PRIMARY CODE FT
Daily, aerobic, dedicated, low impact exercise.      Discuss switching from sertraline to duloxetine with your PCP as duloxetine generally works better for fibromyalgia   F20.9 F43.22

## 2019-10-08 NOTE — H&P ADULT - NSICDXPASTMEDICALHX_GEN_ALL_CORE_FT
PAST MEDICAL HISTORY:  Chronic leukemia in remission     DM (diabetes mellitus) Type 2    HLD (hyperlipidemia)     Hypertension     Sleep apnea, unspecified type

## 2019-10-08 NOTE — H&P ADULT - HISTORY OF PRESENT ILLNESS
75 yo M pmh schizoaffective, DM2, HTN, HDL, CABG x 2 with c/o increased agitation, depression and unable to take care of himself presented with agitation. 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 Today he went for tx where he became very anxious, agitated, he was medicated there with Ativan 1mg IV, and after that he was sent in for further evaluation. Per outpatient documentation, patient finished his last dose chemo today. Patient himself states he is not able to take care of himself anymore. He is afraid to be by himself. Otherwise denied SI/HI. Denies falling, pain, SOB, fever, chills, chest and abdominal discomfort. Denies recent use of alcohol or illicit drug. No evidence of physical injuries.    In the ED, patient was noted VSSAF. Patient was evaluated by psychiatry for increased agitation. He was medicated with ativan and Seroquel.

## 2019-10-08 NOTE — ED BEHAVIORAL HEALTH NOTE - BEHAVIORAL HEALTH NOTE
[facility] – Dr. Cancino ([phone] (personal cell phone- [phone]/SUZIE-Horacio) –  Kyler Elias. [birth year]    Patient has AMBERLY and is emotionally disturbed. He also has “Schizoid disorder.” Patient lives in senior housing. He is being treated for small lymphatic leukemia disorder.  Patient is getting IV chemo therapy (gyzavo) - twice a week which started this week. Patient is meant to get it twice a week 3 weeks a row and then monthly. No PICC or central line.  Over the last 2 days patient has decompensated- his voice is very high strain, dysregulated, not redirectable and he has disturbing other patients. Patient is very apprehensive and unable to maintain his composure. Patient is speaking non-stop and stating thoughts of death and dying- stating he cannot handle this and is going to die anyway. He is scared and afraid to go home. He has no supports in his life. APS is now involved but he is unsure why.

## 2019-10-08 NOTE — ED BEHAVIORAL HEALTH ASSESSMENT NOTE - PAST PSYCHOTROPIC MEDICATION
As of 2016 note pt was on   Ativan 0.5 mg po prn anxiety  Abilify 30 mg po qdaily  Trilafon 4 mg po qAM (Of note: Pt has been on higher doses in the past i.e. 4 mg po qdaily and 8 mg po qhs.) As of 2016 note pt was on   Ativan 0.5 mg po prn anxiety  Abilify 30 mg po qdaily  Trilafon 4 mg po qAM (Of note: Pt has been on higher doses in the past i.e. 4 mg po qdaily and 8 mg po qhs.)  Duloxetine

## 2019-10-08 NOTE — H&P ADULT - PROBLEM SELECTOR PLAN 1
- S/p ativan and seroquel in the ED. Currently mood stable at this time.  - Psych recs seroquel q6h prn and 100mg qhs.  - Continue to f/u further recs.  - Awaiting pharmacy to complete med rec and resume home regimen for anti-psychotic.  - EKG pending. Will need to f/u QTC.

## 2019-10-08 NOTE — ED BEHAVIORAL HEALTH ASSESSMENT NOTE - DIFFERENTIAL
Chronic Paranoid Schizophrenia  Schizoaffective Disorder Schizophrenia vs Schizoaffective d/o - BY HISTORY  r/o Intellectual Disability

## 2019-10-08 NOTE — ED BEHAVIORAL HEALTH ASSESSMENT NOTE - SUMMARY
Pt is a 73yo SWM, domiciled alone with dx of Chronic Schizophrenia (vs Schizoaffective D/O?), multiple prior inpt psych admits incl [facility] (none since 2014), not currently in outpatient psychiatric treatment, hx/o SUSY, CAD s/p CABG, Type 2 DM, HTN, diabetic neuropathy, obesity, spinal stenosis, CLL (undergoing chemo therapy with Dr. Gallo [phone]), was BIBEMS after pt called 911 asking to be taken to the hospital. Pt is focused on inability to care for himself, requesting NH placement. He is possibly over endorsing psychiatric symptoms to gain admission. Pt reports ongoing depression, passive SI, and AH. Pt does not appear internally preoccupied. He is agreeable to restart Seroquel. Pt presents with primary concern of seeking placement.  psychiatry will continue to follow pt while he is admitted to medicine.   Currently he contracts for safety. Pt is a 75yo SWM, domiciled alone in independent living apartment, with dx of AMBERLY and possible hx of Schizophrenia vs Schizoaffective d/o, appearing intellectually limited on exam as well, multiple prior psych hospitalizations, not currently in outpatient psychiatric treatment, no known hx of suicide attempts, unclear hx of manic symptoms, no clear hx of violence or arrests, no known substance abuse, PMH of SUSY, CAD s/p CABG, Type 2 DM, HTN, diabetic neuropathy, obesity, spinal stenosis, CLL (undergoing chemo therapy), was BIBEMS after Dr. Cancino called for pt to come for evaluation.    Pt reports anxiety and depression secondary to his living situation and his perceived lack of support. Pt feels he is unable to care for himself and is requesting admission for placement. There is no clinical indication for psychiatric admission at this time - pt is frustrated and endorses intermittent passive SI but there is no active SI/I/P. Pt has never made a suicide attempt. He is not acutely manic or psychotic. Discussed case with medical provider and recommend social admission for further discussion of placement vs services.

## 2019-10-08 NOTE — ED BEHAVIORAL HEALTH ASSESSMENT NOTE - DETAILS
Pt states that he can not live independently and will die if sent back home. No SI or intent to harm self however can't get out of bed undergoing chemo will sign out to weekend team Discussed with POOJA Coates Pt states that he can not live independently and has thoughts of going to sleep and not waking up. No active SI or intent to harm self. [phone] Dr. Cancino On-call physician at [facility] Dr. Urias was notified

## 2019-10-08 NOTE — H&P ADULT - NSHPLABSRESULTS_GEN_ALL_CORE
(10-08 @ 15:50)                      15.5  11.32 )-----------( 270                 51.5    Neutrophils = 10.48 (92.5%)  Lymphocytes = 0.44 (3.9%)  Eosinophils = 0.00 (0.0%)  Basophils = 0.04 (0.4%)  Monocytes = 0.32 (2.8%)  Bands = 0%    10-08    138  |  103  |  11  ----------------------------<  311<H>  4.2   |  19<L>  |  0.72    Ca    9.3      08 Oct 2019 15:50    TPro  7.4  /  Alb  4.2  /  TBili  0.8  /  DBili  x   /  AST  15  /  ALT  11  /  AlkPhos  106  10-08    Tox:   (10-08 @ 15:50)  Acetaminophen Level, Serum: < 15.0 [15 - 25]  Barbiturates Measurement: --  Benzodiazepines: --  Ethanol, Whole Blood: < 10  Salicylate Level, Serum: < 5.0 [15 - 30]    Urinalysis Basic - ( 08 Oct 2019 18:47 )    Color: YELLOW / Appearance: CLEAR / S.012 / pH: 6.0  Gluc: >1000 / Ketone: NEGATIVE  / Bili: NEGATIVE / Urobili: NORMAL   Blood: NEGATIVE / Protein: 20 / Nitrite: NEGATIVE   Leuk Esterase: NEGATIVE / RBC: 0-2 / WBC 0-2   Sq Epi: x / Non Sq Epi: x / Bacteria: x    EKG: pending

## 2019-10-08 NOTE — ED PROVIDER NOTE - OBJECTIVE STATEMENT
This is a 74 yr old m, pmh, schizoaffective, DM2, HTN, HDL, Cabg x 2 with c/o increased agitation, depression and unable to take care of himself. 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 Today he went for tx where he became very anxious, agitated, he was medicated there with Ativan 1mg IV, and after that he was sent in for further evaluation. PT states he is not able to take care of himself anymore. He is afraid to be by himself. Denies falling, punching or kicking any objects. Denies pain, SOB, fever, chills, chest and abdominal discomfort. Denies recent use of alcohol or illicit drug. No evidence of physical injuries.

## 2019-10-08 NOTE — ED ADULT TRIAGE NOTE - CHIEF COMPLAINT QUOTE
A&OX3 c/o increased anxiety, brought in from [facility] because pt started to become anxious and was yelling pt given Ativan by MD at oncology office, tano hughes/hi

## 2019-10-08 NOTE — H&P ADULT - PROBLEM SELECTOR PLAN 2
- According to outpatient record in chart, patient completed last dose of chemo in clinic earlier today.  - Will need to confirm this information in the am. Consult hematology in the am.

## 2019-10-08 NOTE — ED PROVIDER NOTE - CLINICAL SUMMARY MEDICAL DECISION MAKING FREE TEXT BOX
This is a 74 yr old m, pmh, schizoaffective, DM2, HTN, HDL, Cabg x 2 with c/o increased agitation, depression and unable to take care of himself.   Blood work, ua/ toxicology r/o underlying medial causes.   Finger stick  Psychiatric consult requested- This is a 74 yr old m, pmh, schizoaffective, DM2, HTN, HDL, Cabg x 2 with c/o increased agitation, depression and unable to take care of himself.   Blood work, ua/ toxicology r/o underlying medial causes. - results unremarkable  Finger stick- coverage before meals during ed stay  Psychiatric consult requested- cleared pt.  Pt unable to take care of himself, shopping, cooking, ADL's unsafe discharge, pt will be admitted. This is a 74 yr old m, pmh, schizoaffective, DM2, HTN, HDL, Cabg x 2 with c/o increased agitation, depression and unable to take care of himself.   Blood work, ua/ toxicology r/o underlying medial causes. - results unremarkable  Finger stick- coverage before meals during ed stay  Psychiatric consult requested- cleared pt.  Pt unable to take care of himself, shopping, cooking, ADL's unsafe discharge, pt will be admitted to medicine.

## 2019-10-08 NOTE — ED BEHAVIORAL HEALTH ASSESSMENT NOTE - AXIS III
HTN, CAD s/p CABG, DM2, SUSY, CLL, spinal stenosis, obesity, diabetic neuropathy HTN, CAD s/p CABG, DM2, SUSY, CLL on chemo, spinal stenosis, obesity, diabetic neuropathy

## 2019-10-08 NOTE — ED BEHAVIORAL HEALTH ASSESSMENT NOTE - PSYCHIATRIC ISSUES AND PLAN (INCLUDE STANDING AND PRN MEDICATION)
Schizophrenia vs SAD. Seroquel 50 mg bid. Seroquel 50 mg q 6 hrs prn, Increase dose as tolerated Continue Seroquel 100mg HS from recent admission. Can use Seroquel 50mg PO Q6H PRN for agitation.

## 2019-10-08 NOTE — ED BEHAVIORAL HEALTH ASSESSMENT NOTE - DESCRIPTION
SUSY, CLL, CAD s/p CABG, Type 2 DM, HTN, diabetic neuropathy, obesity, spinal stenosis Never , no children. 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 SUSY, CLL on chemo, CAD s/p CABG, Type 2 DM, HTN, diabetic neuropathy, obesity, spinal stenosis Anxious and loud but in behavioral control. No agitation or aggression. Was given Ativan 2mg IM for anxiety with good effect.     Vital Signs Last 24 Hrs  T(C): 36.5 (08 Oct 2019 14:48), Max: 36.5 (08 Oct 2019 14:48)  T(F): 97.7 (08 Oct 2019 14:48), Max: 97.7 (08 Oct 2019 14:48)  HR: 94 (08 Oct 2019 14:48) (94 - 94)  BP: 179/81 (08 Oct 2019 14:48) (179/81 - 179/81)  BP(mean): --  RR: 16 (08 Oct 2019 14:48) (16 - 16)  SpO2: 98% (08 Oct 2019 14:48) (98% - 98%)

## 2019-10-08 NOTE — ED BEHAVIORAL HEALTH ASSESSMENT NOTE - RISK ASSESSMENT
Pt with no history of suicidal ideation or attempts, has no firearms at home, thus is at low risk for self harm if discharged. Risk factors: Chronic mental illness, mood episode, affective dysregulation, severe anxiety, acute medical problems, recent hospitalization, middle insomnia, not engaged in outpatient psychiatric treatment, hopelessness/despair, strained or limited social supports, housing problems.    Protective factors: No current active SI/HI/I/P or urges to harm self/others, no history of suicide attempts, identifies reasons for living, future orientation, reported compliance with psychiatric medications, abstinence from substances, no access to firearms, help seeking behaviors, and able to engage in safety planning. Low Acute Suicide Risk

## 2019-10-08 NOTE — ED BEHAVIORAL HEALTH ASSESSMENT NOTE - SUICIDE PROTECTIVE FACTORS
Has future plans/Fear of death or the actual act of killing self Identifies reasons for living/Has future plans/Fear of death or the actual act of killing self

## 2019-10-08 NOTE — ED BEHAVIORAL HEALTH ASSESSMENT NOTE - ACTIVATING EVENTS/STRESSORS
Other Non-compliant or not receiving treatment/Current or pending social isolation/Inadequate social supports

## 2019-10-08 NOTE — ED BEHAVIORAL HEALTH ASSESSMENT NOTE - CURRENT MEDICATION
none. Pt states he was taking Seroquel and Klonopin but discontinued meds al weeks ago Pt states he was taking Seroquel and Klonopin but is not sure of dosing

## 2019-10-08 NOTE — ED BEHAVIORAL HEALTH ASSESSMENT NOTE - OTHER PAST PSYCHIATRIC HISTORY (INCLUDE DETAILS REGARDING ONSET, COURSE OF ILLNESS, INPATIENT/OUTPATIENT TREATMENT)
Multiple prior inpt psychiatric admissions since he was in his 20's.  Last inpt admits ([facility]): 11/18/13-12/3/13; 10/30/14-11/6/14.  Last outpt tx/rx: no recent treatment   No hx of SI/SA, HI/HA, drugs/EtOH or cigarettes.

## 2019-10-08 NOTE — ED BEHAVIORAL HEALTH NOTE - BEHAVIORAL HEALTH NOTE
ISTOP Reference #: [id number]    10/04/2019 10/04/2019 diphenoxylate-atropine 2.5-0.025 mg tablet  15 4 Abhi Gallo MD PHD     08/09/2019 08/12/2019 clonazepam 0.5 mg tablet  60 30 [place], [place]     06/06/2019 08/06/2019 tramadol hcl 50 mg tablet  90 30 [place], [place]     06/06/2019 07/11/2019 tramadol hcl 50 mg tablet  90 30 [place], [place]     06/26/2019 06/27/2019 clonazepam 0.5 mg tablet  60 30 [place], [place]     06/06/2019 06/07/2019 tramadol hcl 50 mg tablet  90 30 [place], [place]     04/18/2019 04/19/2019 clonazepam 0.5 mg tablet  60 30 [place], [place]     03/13/2019 03/14/2019 clonazepam 0.5 mg tablet  60 30 [place], [place]     02/15/2019 02/16/2019 oxycodone-acetaminophen 5-325 mg tablet  42 7 Param Doe)     07/31/2019 07/31/2019 lorazepam 0.5 mg tablet  20 10 Prakash Cheek MD     02/09/2019 02/09/2019 oxycodone-acetaminophen 5-325 mg tablet  30 5 Param Doe)     01/31/2019 01/31/2019 oxycodone-acetaminophen 5-325 mg tablet  14 3 Jim Mario MD     12/29/2018 12/29/2018 oxycodone-acetaminophen 5-325 mg tablet  30 5 Jim Mario MD

## 2019-10-08 NOTE — ED ADULT NURSE NOTE - CHIEF COMPLAINT QUOTE
A&OX3 c/o increased anxiety, brought in from [facility] because pt started to become anxious and was yelling pt given Ativan by MD at oncology office, tano hughes/hi

## 2019-10-08 NOTE — H&P ADULT - NSHPREVIEWOFSYSTEMS_GEN_ALL_CORE
I can see her this afternoon  Thanks,  Amara Thakur MD     CONSTITUTIONAL: Weight loss'; No weakness, fevers or chills  EYES/ENT: No visual changes;  No vertigo or throat pain   NECK: No pain or stiffness  RESPIRATORY: No cough, wheezing, hemoptysis; No shortness of breath  CARDIOVASCULAR: No chest pain or palpitations  GASTROINTESTINAL: No abdominal or epigastric pain. No nausea, vomiting, or hematemesis; No diarrhea or constipation. No melena or hematochezia.  GENITOURINARY: No dysuria, frequency or hematuria  NEUROLOGICAL: No numbness or weakness  SKIN: No itching, burning, rashes, or lesions   PSYCH: Depression, anxiety; no hallucination  MSK: no back pain, no rom limitation  All other review of systems is negative unless indicated above.

## 2019-10-08 NOTE — H&P ADULT - NSHPPHYSICALEXAM_GEN_ALL_CORE
T(C): 36.5 (10-08-19 @ 14:48), Max: 36.5 (10-08-19 @ 14:48)  HR: 94 (10-08-19 @ 14:48) (94 - 94)  BP: 179/81 (10-08-19 @ 14:48) (179/81 - 179/81)  RR: 16 (10-08-19 @ 14:48) (16 - 16)  SpO2: 98% (10-08-19 @ 14:48) (98% - 98%)  Wt(kg): --  GENERAL: NAD, well-developed  HEAD:  Atraumatic, Normocephalic  EYES: EOMI, PERRLA, conjunctiva and sclera clear  NECK: Supple, No JVD  CHEST/LUNG: Clear to auscultation bilaterally; No wheeze  HEART: Regular rate and rhythm; No murmurs, rubs, or gallops  ABDOMEN: Soft, Nontender, Nondistended; Bowel sounds present  EXTREMITIES:  2+ Peripheral Pulses, No clubbing, cyanosis, or edema  PSYCH: AAOx3, appears depressed. Agitated at times. No SI/HI  NEUROLOGY: non-focal  SKIN: No rashes or lesions

## 2019-10-08 NOTE — ED BEHAVIORAL HEALTH ASSESSMENT NOTE - CASE SUMMARY
Pt is a 73yo SWM, domiciled alone in independent living apartment, with dx of AMBERLY and possible hx of Schizophrenia vs Schizoaffective d/o, appearing intellectually limited on exam as well, multiple prior psych hospitalizations, not currently in outpatient psychiatric treatment, no known hx of suicide attempts, unclear hx of manic symptoms, no clear hx of violence or arrests, no known substance abuse, PMH of SUSY, CAD s/p CABG, Type 2 DM, HTN, diabetic neuropathy, obesity, spinal stenosis, CLL (undergoing chemo therapy), was BIBEMS after Dr. Cancino called for pt to come for evaluation.    Pt reports anxiety and depression secondary to his living situation and his perceived lack of support. Pt feels he is unable to care for himself and is requesting admission for placement. There is no clinical indication for psychiatric admission at this time - pt is frustrated and endorses intermittent passive SI but there is no active SI/I/P. Pt has never made a suicide attempt. He is not acutely manic or psychotic. Discussed case with medical provider and recommend social admission for further discussion of placement vs services.

## 2019-10-08 NOTE — ED PROVIDER NOTE - PROGRESS NOTE DETAILS
Cabot: Patient signed out to me.  Cleared by psych, to be admitted to medicine for being unable to care for self.

## 2019-10-08 NOTE — H&P ADULT - ASSESSMENT
73 yo M pmh schizoaffective, CLL on chemo, DM2, HTN, HDL, CABG x 2 with c/o increased agitation, depression and unable to take care of himself presented with agitation.

## 2019-10-09 ENCOUNTER — TRANSCRIPTION ENCOUNTER (OUTPATIENT)
Age: 74
End: 2019-10-09

## 2019-10-09 DIAGNOSIS — F20.9 SCHIZOPHRENIA, UNSPECIFIED: ICD-10-CM

## 2019-10-09 DIAGNOSIS — F41.1 GENERALIZED ANXIETY DISORDER: ICD-10-CM

## 2019-10-09 DIAGNOSIS — F25.0 SCHIZOAFFECTIVE DISORDER, BIPOLAR TYPE: ICD-10-CM

## 2019-10-09 DIAGNOSIS — F43.22 ADJUSTMENT DISORDER WITH ANXIETY: ICD-10-CM

## 2019-10-09 LAB
GLUCOSE BLDC GLUCOMTR-MCNC: 160 MG/DL — HIGH (ref 70–99)
GLUCOSE BLDC GLUCOMTR-MCNC: 207 MG/DL — HIGH (ref 70–99)
GLUCOSE BLDC GLUCOMTR-MCNC: 277 MG/DL — HIGH (ref 70–99)
GLUCOSE BLDC GLUCOMTR-MCNC: 295 MG/DL — HIGH (ref 70–99)
GLUCOSE BLDC GLUCOMTR-MCNC: 316 MG/DL — HIGH (ref 70–99)

## 2019-10-09 PROCEDURE — 99233 SBSQ HOSP IP/OBS HIGH 50: CPT

## 2019-10-09 PROCEDURE — 99232 SBSQ HOSP IP/OBS MODERATE 35: CPT

## 2019-10-09 RX ORDER — CLOPIDOGREL BISULFATE 75 MG/1
1 TABLET, FILM COATED ORAL
Qty: 0 | Refills: 0 | DISCHARGE

## 2019-10-09 RX ORDER — CIMETIDINE 200 MG
1 TABLET ORAL
Qty: 0 | Refills: 0 | DISCHARGE

## 2019-10-09 RX ORDER — ASPIRIN/CALCIUM CARB/MAGNESIUM 324 MG
1 TABLET ORAL
Qty: 0 | Refills: 0 | DISCHARGE

## 2019-10-09 RX ORDER — INFLUENZA VIRUS VACCINE 15; 15; 15; 15 UG/.5ML; UG/.5ML; UG/.5ML; UG/.5ML
0.5 SUSPENSION INTRAMUSCULAR ONCE
Refills: 0 | Status: DISCONTINUED | OUTPATIENT
Start: 2019-10-09 | End: 2019-10-11

## 2019-10-09 RX ORDER — ROSUVASTATIN CALCIUM 5 MG/1
1 TABLET ORAL
Qty: 0 | Refills: 0 | DISCHARGE

## 2019-10-09 RX ORDER — HYDROXYZINE HCL 10 MG
1 TABLET ORAL
Qty: 0 | Refills: 0 | DISCHARGE

## 2019-10-09 RX ORDER — QUETIAPINE FUMARATE 200 MG/1
1 TABLET, FILM COATED ORAL
Qty: 0 | Refills: 0 | DISCHARGE

## 2019-10-09 RX ORDER — OLANZAPINE 15 MG/1
2.5 TABLET, FILM COATED ORAL EVERY 6 HOURS
Refills: 0 | Status: DISCONTINUED | OUTPATIENT
Start: 2019-10-09 | End: 2019-10-11

## 2019-10-09 RX ORDER — METOPROLOL TARTRATE 50 MG
1 TABLET ORAL
Qty: 0 | Refills: 0 | DISCHARGE

## 2019-10-09 RX ORDER — AMLODIPINE BESYLATE 2.5 MG/1
1 TABLET ORAL
Qty: 0 | Refills: 0 | DISCHARGE

## 2019-10-09 RX ORDER — CLONAZEPAM 1 MG
1 TABLET ORAL
Qty: 0 | Refills: 0 | DISCHARGE

## 2019-10-09 RX ORDER — QUETIAPINE FUMARATE 200 MG/1
50 TABLET, FILM COATED ORAL EVERY 6 HOURS
Refills: 0 | Status: DISCONTINUED | OUTPATIENT
Start: 2019-10-09 | End: 2019-10-11

## 2019-10-09 RX ORDER — OBINUTUZUMAB 1000 MG/40ML
0 INJECTION, SOLUTION, CONCENTRATE INTRAVENOUS
Qty: 0 | Refills: 0 | DISCHARGE

## 2019-10-09 RX ORDER — QUETIAPINE FUMARATE 200 MG/1
25 TABLET, FILM COATED ORAL
Refills: 0 | Status: DISCONTINUED | OUTPATIENT
Start: 2019-10-09 | End: 2019-10-10

## 2019-10-09 RX ADMIN — GABAPENTIN 600 MILLIGRAM(S): 400 CAPSULE ORAL at 22:40

## 2019-10-09 RX ADMIN — GABAPENTIN 600 MILLIGRAM(S): 400 CAPSULE ORAL at 00:14

## 2019-10-09 RX ADMIN — Medication 1: at 08:08

## 2019-10-09 RX ADMIN — Medication 100 MILLIGRAM(S): at 06:15

## 2019-10-09 RX ADMIN — Medication 3: at 17:10

## 2019-10-09 RX ADMIN — CLOPIDOGREL BISULFATE 75 MILLIGRAM(S): 75 TABLET, FILM COATED ORAL at 11:27

## 2019-10-09 RX ADMIN — QUETIAPINE FUMARATE 100 MILLIGRAM(S): 200 TABLET, FILM COATED ORAL at 22:40

## 2019-10-09 RX ADMIN — GABAPENTIN 600 MILLIGRAM(S): 400 CAPSULE ORAL at 06:15

## 2019-10-09 RX ADMIN — ENOXAPARIN SODIUM 40 MILLIGRAM(S): 100 INJECTION SUBCUTANEOUS at 11:27

## 2019-10-09 RX ADMIN — ATORVASTATIN CALCIUM 80 MILLIGRAM(S): 80 TABLET, FILM COATED ORAL at 22:40

## 2019-10-09 RX ADMIN — Medication 81 MILLIGRAM(S): at 11:27

## 2019-10-09 RX ADMIN — GABAPENTIN 600 MILLIGRAM(S): 400 CAPSULE ORAL at 13:32

## 2019-10-09 RX ADMIN — QUETIAPINE FUMARATE 25 MILLIGRAM(S): 200 TABLET, FILM COATED ORAL at 14:59

## 2019-10-09 RX ADMIN — Medication 2: at 22:40

## 2019-10-09 RX ADMIN — AMLODIPINE BESYLATE 5 MILLIGRAM(S): 2.5 TABLET ORAL at 06:15

## 2019-10-09 RX ADMIN — QUETIAPINE FUMARATE 50 MILLIGRAM(S): 200 TABLET, FILM COATED ORAL at 11:28

## 2019-10-09 NOTE — PROGRESS NOTE ADULT - PROBLEM SELECTOR PLAN 2
- According to outpatient record in chart, patient completed last dose of chemo in clinic 10/8/19  - appreciate oncology input

## 2019-10-09 NOTE — PROGRESS NOTE BEHAVIORAL HEALTH - NSBHFUPINTERVALCCFT_PSY_A_CORE
"I want to go on medication to treat my anxiety and depression." Received Seroquel 50mg PO prn @ 1128 this morning

## 2019-10-09 NOTE — PROGRESS NOTE BEHAVIORAL HEALTH - SUMMARY
Pt is a 73yo SWM, domiciled alone with dx of Chronic Schizophrenia (vs Schizoaffective D/O?), anxiety dos, mdd, si, multiple prior inpt psych admits incl [facility] (none since 2014), not currently in outpatient psychiatric treatment, hx/o SUSY, CAD s/p CABG, Type 2 DM, HTN, diabetic neuropathy, obesity, spinal stenosis, CLL (undergoing chemo therapy with Dr. Gallo [phone]), was BIBEMS after pt called 911 asking to be taken to the hospital. Patient currently presents with baseline irritability, no acute psychiatric symptoms. He is requesting placing to assisted living or a nursing home. He is not appropriate for a psychiatric admission.   Plan:  -patient is not appropriate for inpatient psychiatric hospitalization  -Increase Seroquel to 100mg po hs to address irritability Pt is a 73yo SWM, domiciled alone,  as per chart has h/of ? Schizophrenia (vs Schizoaffective D/O?), anxiety dos, AMBERLY, with  prior inpt psych admits at [facility]  (none since 2014), not currently in outpatient psychiatric treatment, hx/o SUSY, CAD s/p CABG, Type 2 DM, HTN, diabetic neuropathy, obesity, spinal stenosis, CLL (undergoing chemo therapy with Dr. Gallo [phone]), was BIBEMS after pt called 911 asking to be taken to the hospital. Patient currently presents with baseline irritability, no acute psychiatric symptoms. He is requesting placing to assisted living or a nursing home. He is not appropriate for a psychiatric admission as he does not meet criteria for involuntary hospitalization.      Plan:  -Seroquel 25mg po qam and Seroquel 100mg po hs   -Seroquel 50mg po q6h prn for mild agitation  -Zyprexa 2.5mg IM q6h prn for severe agitation.   -Monitor EKG for qtc    Will follow

## 2019-10-09 NOTE — PROGRESS NOTE BEHAVIORAL HEALTH - OTHER
pill rolling noted unable to assess-in bed somewhat childlike focused on placement focused on inability to care for himself, and feeling anxious when around people pt does not appear internally preoccupied, states the voices are his own thoughts Appears limited, possibly borderline intellectual functioning Poor historian regarding medical care and recent admission Poor historian regarding medical care and hx of treatments pt does not appear internally preoccupied, states the voices are his own thoughts, denies CAH Appears limited, seems ?intellectually limited

## 2019-10-09 NOTE — CONSULT NOTE ADULT - SUBJECTIVE AND OBJECTIVE BOX
MEDICAL ONCOLOGY CONSULTATION    CC: CLL/SLL    HPI: Patient is known to outpatient medical oncology center. 74 year old  man with PMH of schizoaffective d/o, T2DM, HTN, HDL, CAD s/p CABG x 2vv, CLL/SLL on ibrutinib p/w   agitation, depression and inability to take care of himself.  Patient states he is not able to take care of himself anymore. He is afraid to be by himself. He states that he needs to be in a nursing home. He denies any suicidal ideation. He denies pain, SOB, fever, chills, emesis or abdominal discomfort. Pt denies recent use of alcohol or illicit drug.     In the ED, patient was noted VSSAF. Patient was evaluated by psychiatry for increased agitation. He was medicated with ativan and Seroquel. Currently pt feels improved    ICU Vital Signs Last 24 Hrs  T(C): 36.7 (09 Oct 2019 13:41), Max: 36.7 (09 Oct 2019 13:41)  T(F): 98 (09 Oct 2019 13:41), Max: 98 (09 Oct 2019 13:41)  HR: 81 (09 Oct 2019 13:41) (69 - 81)  BP: 153/62 (09 Oct 2019 13:41) (153/62 - 172/74)  RR: 18 (09 Oct 2019 13:41) (18 - 18)  SpO2: 99% (09 Oct 2019 13:41) (99% - 100%)    NC/AT; NAD; PERRLA EOMI  supple; obeys simple commands  A and O x 3  MMM  CTA b/l no W  Nl S1 S2 RR; no M  Ab soft; on guarding  no CCE  warm, and dry    LABS             15.5   11.32 )-----------( 270      ( 08 Oct 2019 15:50 )             51.5   10-08    138  |  103  |  11  ----------------------------<  311<H>  4.2   |  19<L>  |  0.72    Ca    9.3      08 Oct 2019 15:50    TPro  7.4  /  Alb  4.2  /  TBili  0.8  /  DBili  x   /  AST  15  /  ALT  11  /  AlkPhos  106  10-08 MEDICAL ONCOLOGY CONSULTATION    CC: CLL/SLL    HPI: Patient is known to outpatient medical oncology center. 74 year old  man with PMH of schizoaffective d/o, T2DM, HTN, HDL, CAD s/p CABG x 2vv, CLL/SLL on ibrutinib p/w   agitation, depression and inability to take care of himself.  Patient states he is not able to take care of himself anymore. He is afraid to be by himself. He states that he needs to be in a nursing home. He denies any suicidal ideation. He denies pain, SOB, fever, chills, emesis or abdominal discomfort. Pt denies recent use of alcohol or illicit drug.     In the ED, patient was noted VSSAF. Patient was evaluated by psychiatry for increased agitation. He was medicated with ativan and Seroquel. Currently pt feels improved    Review of Systems: CONSTITUTIONAL: Weight loss'; No weakness, fevers or chills  EYES/ENT: No visual changes;  No vertigo or throat pain   NECK: No pain or stiffness  RESPIRATORY: No cough, wheezing, hemoptysis; No shortness of breath  CARDIOVASCULAR: No chest pain or palpitations  GASTROINTESTINAL: No abdominal or epigastric pain. No nausea, vomiting, or hematemesis; No diarrhea or constipation. No melena or hematochezia.  GENITOURINARY: No dysuria, frequency or hematuria  NEUROLOGICAL: No numbness or weakness  SKIN: No itching, burning, rashes, or lesions   PSYCH: Depression, anxiety; no hallucination  MSK: no back pain, no rom limitation All other review of systems is negative unless indicated above.	      Allergies and Intolerances:        Allergies:  	No Known Allergies:     Home Medications:  acyclovir 800 mg oral tablet: 1 tab(s) orally 2 times a day for one week as directed  (09 Oct 2019 15:09)  ARIPiprazole 30 mg oral tablet: 1 tab(s) orally once a day (09 Oct 2019 15:09)  famotidine 20 mg oral tablet: 1 tab(s) orally once a day (09 Oct 2019 15:09)  gabapentin 600 mg oral tablet: 1 tab(s) orally 3 times a day (09 Oct 2019 15:05)  glimepiride 4 mg oral tablet: 1 tab(s) orally once a day (09 Oct 2019 15:09)  ibrutinib 420 mg oral tablet: 1 tab(s) orally once a day (09 Oct 2019 15:09)  Januvia 100 mg oral tablet: 1 tab(s) orally once a day (09 Oct 2019 15:09)  Lomotil 2.5 mg-0.025 mg oral tablet: 1 tab(s) orally every 6 hours, As Needed for diarrhea  (09 Oct 2019 15:09)  losartan 100 mg oral tablet: 1 tab(s) orally once a day (09 Oct 2019 15:09)  metFORMIN 1000 mg oral tablet: 1 tab(s) orally 2 times a day (09 Oct 2019 15:09)  perphenazine 8 mg oral tablet: 1 tab(s) orally 2 times a day (09 Oct 2019 15:09)  ProAir HFA 90 mcg/inh inhalation aerosol: 1-2 puff(s) inhaled every 4-6 hours, As Needed and As Directed  (09 Oct 2019 15:09)  QUEtiapine 100 mg oral tablet: 1 tab(s) orally 2 times a day    **last filled 9/27/2019 for a Q#60 (09 Oct 2019 15:08)  QUEtiapine 200 mg oral tablet: 1 tab(s) orally 2 times a day    **last filled 9/19/2019 for a Q#60 (09 Oct 2019 15:08)  rosuvastatin 20 mg oral tablet: 1 tab(s) orally once a day (at bedtime) (09 Oct 2019 15:09)  Toprol-XL 50 mg oral tablet, extended release: 1 tab(s) orally once a day (09 Oct 2019 15:09)    MEDICATIONS  (STANDING):  amLODIPine   Tablet 5 milliGRAM(s) Oral daily  aspirin enteric coated 81 milliGRAM(s) Oral daily  atorvastatin 80 milliGRAM(s) Oral at bedtime  clopidogrel Tablet 75 milliGRAM(s) Oral daily  dextrose 5%. 1000 milliLiter(s) (50 mL/Hr) IV Continuous <Continuous>  dextrose 50% Injectable 12.5 Gram(s) IV Push once  dextrose 50% Injectable 25 Gram(s) IV Push once  dextrose 50% Injectable 25 Gram(s) IV Push once  enoxaparin Injectable 40 milliGRAM(s) SubCutaneous daily  gabapentin 600 milliGRAM(s) Oral three times a day  influenza   Vaccine 0.5 milliLiter(s) IntraMuscular once  insulin lispro (HumaLOG) corrective regimen sliding scale   SubCutaneous three times a day before meals  insulin lispro (HumaLOG) corrective regimen sliding scale   SubCutaneous at bedtime  metoprolol succinate  milliGRAM(s) Oral daily  QUEtiapine 25 milliGRAM(s) Oral <User Schedule>  QUEtiapine 100 milliGRAM(s) Oral at bedtime    MEDICATIONS  (PRN):  dextrose 40% Gel 15 Gram(s) Oral once PRN Blood Glucose LESS THAN 70 milliGRAM(s)/deciliter  glucagon  Injectable 1 milliGRAM(s) IntraMuscular once PRN Glucose LESS THAN 70 milligrams/deciliter  OLANZapine Injectable 2.5 milliGRAM(s) IntraMuscular every 6 hours PRN SEVERE agitation  QUEtiapine 50 milliGRAM(s) Oral every 6 hours PRN agitation  QUEtiapine 50 milliGRAM(s) Oral every 6 hours PRN agitation     Past Medical, Past Surgical, and Family History:  PAST MEDICAL HISTORY:  Chronic leukemia in remission   DM (diabetes mellitus) Type 2  HLD (hyperlipidemia)   Hypertension   Sleep apnea, unspecified type.     PAST SURGICAL HISTORY:  S/P CABG (coronary artery bypass graft).     FAMILY HISTORY:  No pertinent family history in first degree relatives.     No Pertinent Family History in first degree relatives of: Denies.     Social History:  Social History (marital status, living situation, occupation, tobacco use, alcohol and drug use, and sexual history): No smoking, alcohol or drug use.	     Tobacco Screening:  · Core Measure Site	No	    Vital Signs Last 24 Hrs  T(C): 36.7 (09 Oct 2019 13:41), Max: 36.7 (09 Oct 2019 13:41)  T(F): 98 (09 Oct 2019 13:41), Max: 98 (09 Oct 2019 13:41)  HR: 81 (09 Oct 2019 13:41) (69 - 81)  BP: 153/62 (09 Oct 2019 13:41) (153/62 - 172/74)  RR: 18 (09 Oct 2019 13:41) (18 - 18)  SpO2: 99% (09 Oct 2019 13:41) (99% - 100%)    NC/AT; NAD; PERRLA EOMI  supple; obeys simple commands  A and O x 3  MMM  CTA b/l no W  Nl S1 S2 RR; no M  Ab soft; on guarding  no CCE  warm, and dry    LABS             15.5   11.32 )-----------( 270      ( 08 Oct 2019 15:50 )             51.5   10-08    138  |  103  |  11  ----------------------------<  311<H>  4.2   |  19<L>  |  0.72    Ca    9.3      08 Oct 2019 15:50    TPro  7.4  /  Alb  4.2  /  TBili  0.8  /  DBili  x   /  AST  15  /  ALT  11  /  AlkPhos  106  10-08

## 2019-10-09 NOTE — PROGRESS NOTE BEHAVIORAL HEALTH - CASE SUMMARY
Patient seen and evaluated, agree with above assessment and plan, pt. AAOX3, appears concrete, limited and child like. Patient denies AH and VH. Stated that he is feeling safe in the hospital. Reports passive suicidal thoughts however he adamantly denies active SI and HI.  He is future oriented. Denies past SA.  Patient States he is anxious and depressed because of his situation, he does not want to return to his apartment as people "gossip about me". Patient repeatedly stated that he cannot function at home and need more help. Patient is requesting to be placed in a assisted living or a nursing home. Recommend to increase Seroquel as written above, PRNs as above, monitor EKG for qtc, will follow Patient seen and evaluated, agree with above assessment and plan, pt. AAOX3, appears concrete, limited and child like. Patient denies AH and VH. Stated that he is feeling safe in the hospital. Reports passive suicidal thoughts however he adamantly denies active SI and HI.  He is future oriented. Denies past SA.  Patient states he is anxious and depressed because of his situation, he does not want to return to his apartment as people "gossip about me". Patient repeatedly stated that he cannot function at home and need more help. Patient is requesting to be placed in a assisted living or a nursing home. Recommend to increase Seroquel as written above, PRNs as above, monitor EKG for qtc, will follow

## 2019-10-09 NOTE — PROGRESS NOTE ADULT - ASSESSMENT
73 yo M pmh schizoaffective, CLL on chemo, DM2, HTN, HDL, CABG x 2 with presented with agitation and feelings of depression, admitted for further workup and management.

## 2019-10-09 NOTE — CONSULT NOTE ADULT - ASSESSMENT
Full consult to follow 74 year old  man with PMH of schizoaffective d/o, T2DM, HTN, HDL, CAD s/p CABG x 2vv, CLL/SLL on ibrutinib p/w worsening agitation and inability to take care of himself at home    PROBLEM #1 CLL/SLL  -chronic condition and has been well controlled on ibrutinib; pt's adherence to the medication is not reliable due to his psychiatric/social situation  -would hold ibrutinib at this time until his psych/social issues are resolved  -patient will benefit from continuation of the ibrutinib and outpt f/up with med onc office    Dr. Abhi Gallo will see the patient tomorrow; please call with question

## 2019-10-09 NOTE — PROGRESS NOTE ADULT - PROBLEM SELECTOR PLAN 1
- S/p ativan and seroquel in the ED. Currently mood stable at this time.  - Psych recs seroquel q6h prn and 100mg qhs.  - Continue to f/u further recs, appreciate input  - Awaiting pharmacy to complete med rec and resume home regimen for anti-psychotic  - f/u EKG for QTC

## 2019-10-09 NOTE — DISCHARGE NOTE PROVIDER - NSDCCPCAREPLAN_GEN_ALL_CORE_FT
PRINCIPAL DISCHARGE DIAGNOSIS  Diagnosis: Adjustment disorder with anxious mood  Assessment and Plan of Treatment: You were seen by the psychiatry team during hospitalization and your medications were continued. For discharge ____________ ***      SECONDARY DISCHARGE DIAGNOSES  Diagnosis: CLL (chronic lymphocytic leukemia)  Assessment and Plan of Treatment: Continue to follow-up with your hematology team as outpatient for ongoing treatment/care.    Diagnosis: Schizophrenia  Assessment and Plan of Treatment: Continue your medications as directed and follow up with your primary care provider/psychiatrist for further evaluation/management. If you are ever in need of immediate psychiatric assistance you may reach out to the [facility] [phone].    Diagnosis: Type 2 diabetes mellitus without complication, unspecified whether long term insulin use  Assessment and Plan of Treatment: Continue your medication regimen and a consistent carbohydrate diet (Meaning eating the same amount of carbohydrates at the same time each day). Monitor for signs/symptoms of low blood glucose, such as, dizziness, altered mental status, or cool/clammy skin. In addition, monitor for signs/symptoms of high blood glucose, such as, feeling hot, dry, fatigued, or with increased thirst/urination. Make regular podiatry appointments in order to have feet checked for wounds and uncontrolled toe nail growth to prevent infections, as well as, appointments with an ophthalmologist to monitor your vision. PRINCIPAL DISCHARGE DIAGNOSIS  Diagnosis: Adjustment disorder with anxious mood  Assessment and Plan of Treatment: Please follow up with the medical doctors/Pscyhiatrist upon arrival to [facility].  You were seen by the psychiatry team during hospitalization and your medications were continued.    -Continue seroquel 50mg at 8am, 1pm and 100mg qhs.      SECONDARY DISCHARGE DIAGNOSES  Diagnosis: CAD (coronary artery disease)  Assessment and Plan of Treatment: Please follow up with the medical doctors/Pscyhiatrist upon arrival to [facility]. - C/w ASA, plavix and BB. No signs of acute ACS.    Diagnosis: Hypertension  Assessment and Plan of Treatment: Please follow up with the medical doctors/Pscyhiatrist upon arrival to [facility]  - continue home dose norvasc and Betablockler as prescribed    Diagnosis: Schizophrenia  Assessment and Plan of Treatment: Please follow up with the medical doctors/Pscyhiatrist upon arrival to [facility]  Continue with seroquel 50mg at 8am, 1pm and 100mg qhs    Diagnosis: CLL (chronic lymphocytic leukemia)  Assessment and Plan of Treatment: Please follow up with the medical doctors/Pscyhiatrist upon arrival to [facility].  Continue to follow-up with your hematology team as outpatient for ongoing treatment/care.- According to outpatient record in chart, patient completed last dose of chemo in clinic 10/8/19  - seen by oncology here, appreciate input, holding ibrutinib during active psychiatric issues, pt to follow up outpatient with oncology after psychiatric issues are stable for evaluation for resuming ibrutinib.  Dr. Gallo [phone]    Diagnosis: Type 2 diabetes mellitus without complication, unspecified whether long term insulin use  Assessment and Plan of Treatment: Please follow up with the medical doctors/Pscyhiatrist upon arrival to [facility]. Continue your medication regimen and a consistent carbohydrate diet (Meaning eating the same amount of carbohydrates at the same time each day). Monitor for signs/symptoms of low blood glucose, such as, dizziness, altered mental status, or cool/clammy skin. In addition, monitor for signs/symptoms of high blood glucose, such as, feeling hot, dry, fatigued, or with increased thirst/urination. Make regular podiatry appointments in order to have feet checked for wounds and uncontrolled toe nail growth to prevent infections, as well as, appointments with an ophthalmologist to monitor your vision.  Continue your oral regimen as prescribed

## 2019-10-09 NOTE — PROGRESS NOTE ADULT - SUBJECTIVE AND OBJECTIVE BOX
Chief Complaint: Patient is a 74y old  Male who presents with a chief complaint of Agitation (09 Oct 2019 08:26)    INTERVAL HPI/OVERNIGHT EVENTS: No acute overnight events, pt pleasant this AM, denies pain or discomfort, feels safe currently in the hospital, denies SI/HI.     MEDICATIONS  (STANDING):  amLODIPine   Tablet 5 milliGRAM(s) Oral daily  aspirin enteric coated 81 milliGRAM(s) Oral daily  atorvastatin 80 milliGRAM(s) Oral at bedtime  clopidogrel Tablet 75 milliGRAM(s) Oral daily  dextrose 5%. 1000 milliLiter(s) (50 mL/Hr) IV Continuous <Continuous>  dextrose 50% Injectable 12.5 Gram(s) IV Push once  dextrose 50% Injectable 25 Gram(s) IV Push once  dextrose 50% Injectable 25 Gram(s) IV Push once  enoxaparin Injectable 40 milliGRAM(s) SubCutaneous daily  gabapentin 600 milliGRAM(s) Oral three times a day  influenza   Vaccine 0.5 milliLiter(s) IntraMuscular once  insulin lispro (HumaLOG) corrective regimen sliding scale   SubCutaneous three times a day before meals  insulin lispro (HumaLOG) corrective regimen sliding scale   SubCutaneous at bedtime  metoprolol succinate  milliGRAM(s) Oral daily  QUEtiapine 100 milliGRAM(s) Oral at bedtime    MEDICATIONS  (PRN):  dextrose 40% Gel 15 Gram(s) Oral once PRN Blood Glucose LESS THAN 70 milliGRAM(s)/deciliter  glucagon  Injectable 1 milliGRAM(s) IntraMuscular once PRN Glucose LESS THAN 70 milligrams/deciliter  QUEtiapine 50 milliGRAM(s) Oral every 6 hours PRN agitation    Vital Signs Last 24 Hrs  T(C): 36.2 (09 Oct 2019 06:05), Max: 36.7 (08 Oct 2019 15:11)  T(F): 97.2 (09 Oct 2019 06:05), Max: 98 (08 Oct 2019 15:11)  HR: 69 (09 Oct 2019 06:05) (69 - 94)  BP: 155/73 (09 Oct 2019 06:05) (155/73 - 179/81)  BP(mean): --  RR: 18 (09 Oct 2019 06:05) (16 - 18)  SpO2: 99% (09 Oct 2019 06:05) (98% - 100%)    I&O's Detail    CAPILLARY BLOOD GLUCOSE    POCT Blood Glucose.: 160 mg/dL (09 Oct 2019 07:41)  POCT Blood Glucose.: 182 mg/dL (08 Oct 2019 22:37)  POCT Blood Glucose.: 297 mg/dL (08 Oct 2019 18:09)      Physical Exam  Gen: laying in bed in NAD  Neuro: Alert and oriented x3, no focal deficits  Psych: Calm, speaks loudly, denies SI or HI  HEENT: MMM  Pulm: clear bilaterally without wheezing  CV: regular rate, nl s1, s2, no murmurs appreciated  Abd: Soft, nontender, nondistended, normoactive BS  Ext: Warm, no significant edema  Skin: no vesicles, lesions or rashes appreciated      LABS:                        15.5   11.32 )-----------( 270      ( 08 Oct 2019 15:50 )             51.5     10-08    138  |  103  |  11  ----------------------------<  311<H>  4.2   |  19<L>  |  0.72    Ca    9.3      08 Oct 2019 15:50    TPro  7.4  /  Alb  4.2  /  TBili  0.8  /  DBili  x   /  AST  15  /  ALT  11  /  AlkPhos  106  10-08    LIVER FUNCTIONS - ( 08 Oct 2019 15:50 )  Alb: 4.2 g/dL / Pro: 7.4 g/dL / ALK PHOS: 106 u/L / ALT: 11 u/L / AST: 15 u/L / GGT: x     / T. Bili 0.8 mg/dL / D. Bili x           Urinalysis Basic - ( 08 Oct 2019 18:47 )    Color: YELLOW / Appearance: CLEAR / S.012 / pH: 6.0  Gluc: >1000 / Ketone: NEGATIVE  / Bili: NEGATIVE / Urobili: NORMAL   Blood: NEGATIVE / Protein: 20 / Nitrite: NEGATIVE   Leuk Esterase: NEGATIVE / RBC: 0-2 / WBC 0-2   Sq Epi: x / Non Sq Epi: x / Bacteria: x

## 2019-10-09 NOTE — DISCHARGE NOTE PROVIDER - HOSPITAL COURSE
73 y/o M h/o schizoaffective, CLL on chemo, DM2, HTN, HDL, CABG x 2 with c/o increased agitation, depression and unable to take care of himself         Adjustment disorder with anxious mood in setting of h/o schizoaffective disorder presenting with agitation for which psychiatry consulted and patient's Seroquel dosages were continued; Seroquel also recommended as PRN for agitation_______    Chronic lymphocytic leukemia upon looking at outpatient chart, patient completed last dose of chemotherapy before admission; Hematology consult as inpatient; Labs stable     Type 2 diabetes mellitus for which PO home regimen held upon admission and patient place on SSI to cover FSG qAC and qHS    Hypertension for which BP monitored - Stable and continued with Norvasc/BB     CAD for which ASA/Plavix continued; Stable no signs of ACS        Dispo - 75 y/o M h/o schizoaffective, CLL on chemo, DM2, HTN, HDL, CABG x 2 with c/o increased agitation, depression and unable to take care of himself          Adjustment disorder with anxious mood     - S/p ativan and seroquel in the ED. Evaluated by psych, placed on seroquel q6h prn and 100mg qhs    - remains intermittently agitated    - per psych, seroquel 50mg at 8am, 1pm and 100mg qhs.         CLL (chronic lymphocytic leukemia).  Plan: - According to outpatient record in chart, patient completed last dose of chemo in clinic 10/8/19    - seen by oncology here, appreciate input, holding ibrutinib during active psychiatric issues, pt to follow up outpatient with oncology after psychiatric issues are stable for evaluation for resuming ibrutinib.         Type 2 diabetes mellitus without complication, unspecified whether long term insulin use.  Plan: - Hold home po regimen.    - SSI to cover FSG qAC and qHS.          Hypertension.  Plan: - BP stable currently.    - continue home dose norvasc and BB.         CAD (coronary artery disease).  Plan: - C/w ASA, plavix and BB. No signs of acute ACS.          Need for prophylactic measure. Plan: - DVT ppx w/ lovenox    - Diabetic diet.            Dispo: [facility]

## 2019-10-09 NOTE — PROGRESS NOTE BEHAVIORAL HEALTH - RISK ASSESSMENT
low: no history of SI/SA, future oriented  His prior reports of SI were in context of the secondary gain of staying in the hospital low:   Protective factors; no history of SI/SA, future oriented, treatment seeking, wants to be placed  Risk factors; Passive SI, His prior reports of passive SI were in context of asking to be placed in a facility, h/o psych. admissions, lives alone, poor social support, h/o anxiety, "h.o schizophrenia    Not at acute risk of harm to self or others at this time, and does not meet criteria for involuntary admission at this time.  pt. will benefit from structured/supervised living setting vs additional services.

## 2019-10-09 NOTE — PROGRESS NOTE BEHAVIORAL HEALTH - NSBHFUPINTERVALHXFT_PSY_A_CORE
Patient seen and evaluated at bedside this morning. Patient A&O X3, cooperative and engaged in interview, noted to be pill rolling. Patient speaks in loud, bold tone of voice, able to make needs known. States he is anxious and depressed, and does not want to return to his apartment as people "gossip" about him and call him names and he does not feel safe. Pt denies si/hi, delusions but states, he would be ok if "death would come." He denies thoughts to harm others, has no issues sleeping or eating at present. He states he hears voices but believes they are his own thoughts. Patient requesting medication to treat his "anxiety and depression", states he has not been medication complaint in past. Patient seen and evaluated at bedside this morning. Patient A&O X3, cooperative and engaged in interview. Patient speaks in loud, bold tone of voice, able to make needs known. States he is anxious and depressed, and does not want to return to his apartment as people "gossip" about him and call him names. intellectually limited   intellectually limited Pt adamantly denies si/hi, delusions but states, he would be ok if "death would come." He denies thoughts to harm others, has no issues sleeping or eating at present. Denies SA in the past.  He states he hears voices but believes they are his own thoughts. Patient requesting medication to treat his "anxiety ", states he has not been medication complaint in past.    Pt. stated that he lives alone and did not see his siblings for a long time. Has no friends.

## 2019-10-10 LAB — GLUCOSE BLDC GLUCOMTR-MCNC: 223 MG/DL — HIGH (ref 70–99)

## 2019-10-10 PROCEDURE — 99233 SBSQ HOSP IP/OBS HIGH 50: CPT

## 2019-10-10 RX ORDER — QUETIAPINE FUMARATE 200 MG/1
50 TABLET, FILM COATED ORAL
Refills: 0 | Status: DISCONTINUED | OUTPATIENT
Start: 2019-10-10 | End: 2019-10-11

## 2019-10-10 RX ADMIN — Medication 2: at 07:59

## 2019-10-10 RX ADMIN — QUETIAPINE FUMARATE 50 MILLIGRAM(S): 200 TABLET, FILM COATED ORAL at 11:01

## 2019-10-10 RX ADMIN — QUETIAPINE FUMARATE 100 MILLIGRAM(S): 200 TABLET, FILM COATED ORAL at 21:40

## 2019-10-10 RX ADMIN — GABAPENTIN 600 MILLIGRAM(S): 400 CAPSULE ORAL at 13:08

## 2019-10-10 RX ADMIN — ENOXAPARIN SODIUM 40 MILLIGRAM(S): 100 INJECTION SUBCUTANEOUS at 13:09

## 2019-10-10 RX ADMIN — ATORVASTATIN CALCIUM 80 MILLIGRAM(S): 80 TABLET, FILM COATED ORAL at 21:39

## 2019-10-10 RX ADMIN — Medication 2: at 12:02

## 2019-10-10 RX ADMIN — QUETIAPINE FUMARATE 25 MILLIGRAM(S): 200 TABLET, FILM COATED ORAL at 07:59

## 2019-10-10 RX ADMIN — GABAPENTIN 600 MILLIGRAM(S): 400 CAPSULE ORAL at 05:40

## 2019-10-10 RX ADMIN — OLANZAPINE 2.5 MILLIGRAM(S): 15 TABLET, FILM COATED ORAL at 15:03

## 2019-10-10 RX ADMIN — QUETIAPINE FUMARATE 50 MILLIGRAM(S): 200 TABLET, FILM COATED ORAL at 13:08

## 2019-10-10 RX ADMIN — OLANZAPINE 2.5 MILLIGRAM(S): 15 TABLET, FILM COATED ORAL at 09:21

## 2019-10-10 RX ADMIN — Medication 2: at 17:16

## 2019-10-10 RX ADMIN — GABAPENTIN 600 MILLIGRAM(S): 400 CAPSULE ORAL at 21:40

## 2019-10-10 RX ADMIN — AMLODIPINE BESYLATE 5 MILLIGRAM(S): 2.5 TABLET ORAL at 05:40

## 2019-10-10 RX ADMIN — Medication 81 MILLIGRAM(S): at 13:08

## 2019-10-10 RX ADMIN — Medication 100 MILLIGRAM(S): at 05:40

## 2019-10-10 RX ADMIN — CLOPIDOGREL BISULFATE 75 MILLIGRAM(S): 75 TABLET, FILM COATED ORAL at 13:08

## 2019-10-10 NOTE — PROGRESS NOTE BEHAVIORAL HEALTH - OTHER
no rigidity noted unable to assess-in bed somewhat childlike focused on placement focused on going to [facility] pt does not appear internally preoccupied, states the voices are his own thoughts, denies CAH Appears limited, seems ?intellectually limited Poor historian regarding medical care and recent admission Poor historian regarding medical care and hx of treatments concrete, goal directed, focused on placement focused on placement, NH vs facility vs going to [facility]

## 2019-10-10 NOTE — PROGRESS NOTE ADULT - PROBLEM SELECTOR PLAN 1
- S/p ativan and seroquel in the ED. Evaluated by psych, placed on seroquel q6h prn and 100mg qhs  - this AM agitated, received 2.5mg zyprexa and 50mg seroquel  - per psych, seroquel incrased to 50mg at 8am, 1pm and 100mg qhs

## 2019-10-10 NOTE — PROGRESS NOTE BEHAVIORAL HEALTH - RISK ASSESSMENT
low:   Protective factors; no history of SI/SA, future oriented, treatment seeking, wants to be placed  Risk factors; Passive SI, His prior reports of passive SI were in context of asking to be placed in a facility, h/o psych. admissions, lives alone, poor social support, h/o anxiety, "h.o schizophrenia    Not at acute risk of harm to self or others at this time, and does not meet criteria for involuntary admission at this time.  pt. will benefit from structured/supervised living setting vs additional services. low:   Protective factors; no history of SI/SA, future oriented, treatment seeking, wants to be placed  Risk factors; Passive SI, His prior reports of passive SI were in context of asking to be placed in a facility, h/o psych. admissions, lives alone, poor social support, h/o anxiety, "h.o schizophrenia    Not at acute risk of harm to self or others at this time and not acutely psychotic,  does not meet criteria for involuntary admission at this time. Pt. is asking to be placed in a facility vs NH, he thinks he needs more help and cannot function at home. pt. will benefit from structured/supervised living setting vs additional services.

## 2019-10-10 NOTE — PROGRESS NOTE BEHAVIORAL HEALTH - NSBHFUPINTERVALHXFT_PSY_A_CORE
Patient seen and evaluated at bedside this afternoon. Patient intellectually limited. A&O X3, states he is anxious and depressed, cooperative and engaged in interview, however perseverating on going to [facility]. Patient able to lower voice when asked to do so as his voice is loud and appears to be shouting. Pt denies si/hi, denies thoughts to harm others, has no issues sleeping or eating at present. Denies SA in the past.  He states he hears voices but is unable to articulate what the voices are saying and believes they are his own thoughts Discussed with patient medication changes to help with his c/o anxiety and reinforced with patient importance to focus on self at present and allow staff to work on his living situation, verbalized understanding.    Pt. stated that he lives alone and did not see his siblings for a long time. Has no friends. Patient seen and evaluated at bedside this afternoon. Patient seems to be intellectually limited. A&O X3, states he is anxious and depressed, cooperative and engaged in interview, however perseverating on going to [facility]. Patient able to lower voice when asked to do so as his voice is loud and appears to be shouting. Pt denies si/hi, denies thoughts to harm others, has no issues sleeping or eating at present. Denies SA in the past. Pt. does not appear internally preoccupied.  When asked about AH, he was vague and states "Maybe". Then he states that he believes they are his own thoughts . Denies CAH. Discussed with patient medication changes to help with his c/o anxiety and reinforced with patient importance to focus on self at present and allow staff to work on his living situation, verbalized understanding.    Dr. Cancino (palliative care physician) called from [facility] [phone] : He stated that patient recently had a breakdown during his treatment., he was talking about death, was not verbally redirectable, labile, started to disturb other patients, crying and screaming, and received Ativan 1mg for anxiety. He further stated that he is progressively declining and has pervasive anxiety. He has been on Seroquel by his primary physician.

## 2019-10-10 NOTE — PROGRESS NOTE ADULT - ASSESSMENT
75 yo M pmh schizoaffective, CLL on chemo, DM2, HTN, HDL, CABG x 2 with presented with agitation and feelings of depression, admitted for further workup and management.

## 2019-10-10 NOTE — PROGRESS NOTE ADULT - SUBJECTIVE AND OBJECTIVE BOX
Chief Complaint: Patient is a 74y old  Male who presents with a chief complaint of Agitation (09 Oct 2019 08:26)    INTERVAL HPI/OVERNIGHT EVENTS: Agitated this morning, received zyprexa 2.5mg IM at 9:21am and seroquel 50mg PO at 11am, pt agitated, shouting, not wishing to be seen and evaluated by medicine at this time    MEDICATIONS  (STANDING):  amLODIPine   Tablet 5 milliGRAM(s) Oral daily  aspirin enteric coated 81 milliGRAM(s) Oral daily  atorvastatin 80 milliGRAM(s) Oral at bedtime  clopidogrel Tablet 75 milliGRAM(s) Oral daily  dextrose 5%. 1000 milliLiter(s) (50 mL/Hr) IV Continuous <Continuous>  dextrose 50% Injectable 12.5 Gram(s) IV Push once  dextrose 50% Injectable 25 Gram(s) IV Push once  dextrose 50% Injectable 25 Gram(s) IV Push once  enoxaparin Injectable 40 milliGRAM(s) SubCutaneous daily  gabapentin 600 milliGRAM(s) Oral three times a day  influenza   Vaccine 0.5 milliLiter(s) IntraMuscular once  insulin lispro (HumaLOG) corrective regimen sliding scale   SubCutaneous three times a day before meals  insulin lispro (HumaLOG) corrective regimen sliding scale   SubCutaneous at bedtime  metoprolol succinate  milliGRAM(s) Oral daily  QUEtiapine 50 milliGRAM(s) Oral <User Schedule>  QUEtiapine 100 milliGRAM(s) Oral at bedtime    MEDICATIONS  (PRN):  dextrose 40% Gel 15 Gram(s) Oral once PRN Blood Glucose LESS THAN 70 milliGRAM(s)/deciliter  glucagon  Injectable 1 milliGRAM(s) IntraMuscular once PRN Glucose LESS THAN 70 milligrams/deciliter  OLANZapine Injectable 2.5 milliGRAM(s) IntraMuscular every 6 hours PRN SEVERE agitation  QUEtiapine 50 milliGRAM(s) Oral every 6 hours PRN agitation    Vital Signs Last 24 Hrs  T(C): 36.4 (10 Oct 2019 13:07), Max: 36.5 (10 Oct 2019 05:45)  T(F): 97.5 (10 Oct 2019 13:07), Max: 97.7 (10 Oct 2019 05:45)  HR: 78 (10 Oct 2019 13:07) (78 - 83)  BP: 143/65 (10 Oct 2019 13:07) (125/60 - 143/65)  BP(mean): --  RR: 16 (10 Oct 2019 13:07) (16 - 18)  SpO2: 97% (10 Oct 2019 13:07) (97% - 98%)    I&O's Detail    09 Oct 2019 07:01  -  10 Oct 2019 07:00  --------------------------------------------------------  IN:    Oral Fluid: 960 mL  Total IN: 960 mL    OUT:  Total OUT: 0 mL    Total NET: 960 mL    10 Oct 2019 07:01  -  10 Oct 2019 14:30  --------------------------------------------------------  IN:    Oral Fluid: 960 mL  Total IN: 960 mL    OUT:  Total OUT: 0 mL    Total NET: 960 mL    CAPILLARY BLOOD GLUCOSE    POCT Blood Glucose.: 223 mg/dL (10 Oct 2019 11:20)  POCT Blood Glucose.: 245 mg/dL (10 Oct 2019 07:37)  POCT Blood Glucose.: 316 mg/dL (09 Oct 2019 21:58)  POCT Blood Glucose.: 295 mg/dL (09 Oct 2019 17:07)  POCT Blood Glucose.: 277 mg/dL (09 Oct 2019 16:31)      Physical Exam  General: agitated, shouting  Declined physical exam    LABS:                         15.5   11.32 )-----------( 270      ( 08 Oct 2019 15:50 )             51.5     10-08    138  |  103  |  11  ----------------------------<  311<H>  4.2   |  19<L>  |  0.72    Ca    9.3      08 Oct 2019 15:50    TPro  7.4  /  Alb  4.2  /  TBili  0.8  /  DBili  x   /  AST  15  /  ALT  11  /  AlkPhos  106  10-08      Urinalysis Basic - ( 08 Oct 2019 18:47 )    Color: YELLOW / Appearance: CLEAR / S.012 / pH: 6.0  Gluc: >1000 / Ketone: NEGATIVE  / Bili: NEGATIVE / Urobili: NORMAL   Blood: NEGATIVE / Protein: 20 / Nitrite: NEGATIVE   Leuk Esterase: NEGATIVE / RBC: 0-2 / WBC 0-2   Sq Epi: x / Non Sq Epi: x / Bacteria: x    RADIOLOGY, EKG & ADDITIONAL TESTS: Reviewed.

## 2019-10-10 NOTE — PROGRESS NOTE BEHAVIORAL HEALTH - SUMMARY
Pt is a 75yo SWM, domiciled alone,  as per chart has h/of ? Schizophrenia (vs Schizoaffective D/O?), anxiety dos, AMBERLY, with  prior inpt psych admits at [facility]  (none since 2014), not currently in outpatient psychiatric treatment, hx/o SUSY, CAD s/p CABG, Type 2 DM, HTN, diabetic neuropathy, obesity, spinal stenosis, CLL (undergoing chemo therapy with Dr. Gallo [phone]), was BIBEMS after pt called 911 asking to be taken to the hospital. Patient currently presents with baseline irritability, no acute psychiatric symptoms. He is requesting placing to assisted living or a nursing home. He is not appropriate for a psychiatric admission as he does not meet criteria for involuntary hospitalization.      10/8: Patient seen and evaluated at bedside. Patient perseverating on going to [facility], able to redirect patient with focus on alleviating his anxiety through medication changes, and allowing staff to work on his placement,  verbalized understanding. Discussed with  placement and need to maximize medication for symptoms of anxiety b/f moving forward with possible decision to rec [facility] admission versus other residential housing. Informed POOJA Golden of following rec:    Plan:  -Seroquel 50mg PO @ 0700 & Seroquel 50mg PO @ 1300  -c/w Seroquel 50mg po q6h prn for mild agitation  -c/w Zyprexa 2.5mg IM q6h prn for severe agitation.   -Monitor EKG for qtc-hold psychotropics if qtc >500    Will follow Pt is a 73yo SWM, domiciled alone,  as per chart has h/of ? Schizophrenia (vs Schizoaffective D/O?), anxiety dos, AMBERLY, with  prior inpt psych admits at [facility]  (none since 2014), not currently in outpatient psychiatric treatment, hx/o SUSY, CAD s/p CABG, Type 2 DM, HTN, diabetic neuropathy, obesity, spinal stenosis, CLL (undergoing chemo therapy with Dr. Gallo [phone]), was BIBEMS after pt called 911 asking to be taken to the hospital. Patient currently presents with baseline irritability, no acute psychiatric symptoms. He is requesting placing to assisted living or a nursing home. He is not appropriate for a psychiatric admission as he does not meet criteria for involuntary hospitalization.      Patient seen and evaluated at bedside, AAOX3. Patient perseverating on going to NH vs Assisted living facility vs going to [facility], able to redirect patient with focus on alleviating his anxiety through medication changes, and allowing staff to work on his placement,  verbalized understanding. Discussed with  regrading placement and need to maximize medication for symptoms of anxiety.  Informed POOJA Golden of following rec:    Plan:  -Seroquel 50mg PO @ 0700 & Seroquel 50mg PO @ 1300  -Seroquel 100mg po hs  -c/w Seroquel 50mg po q6h prn for mild agitation  -c/w Zyprexa 2.5mg IM q6h prn for severe agitation.   -Monitor EKG for qtc-hold psychotropics if qtc >500  -    Will follow

## 2019-10-10 NOTE — PROGRESS NOTE BEHAVIORAL HEALTH - NSBHFUPINTERVALCCFT_PSY_A_CORE
"I want to go to [facility]." Patient received Zyprexa 2.5mg IM @ 9:21 and Seroquel 50mg PO @ 11:00 this morning for agitation

## 2019-10-10 NOTE — PROGRESS NOTE ADULT - PROBLEM SELECTOR PLAN 2
- According to outpatient record in chart, patient completed last dose of chemo in clinic 10/8/19  - seen by oncology here, appreciate input, holding ibrutinib at this time per onc

## 2019-10-10 NOTE — PROGRESS NOTE BEHAVIORAL HEALTH - CASE SUMMARY
Patient seen and evaluated, agree with above assessment and plan, pt. AAOX3, patient appears concrete, limited and child like. Patient denies AH and VH. Patient does not appear internally preoccupied. He  adamantly denies active SI and HI.  He is future oriented. Denies past SA.  Patient is not acutely manic or psychotic. Patient is frustrated, anxious and depressed because of his living situation, he does not want to return to his apartment, he repeatedly stated that he cannot function at home and need more help. Patient is requesting to be placed in a assisted living or a nursing home. Recommend to increase Seroquel as written above, PRNs as above, monitor EKG for qtc, case discussed with team, , will follow

## 2019-10-10 NOTE — PROGRESS NOTE BEHAVIORAL HEALTH - PROBLEM SELECTOR PLAN 1
SW to set up SW in the community. Pt will benefit from supportive living placement as he often presents to ED with c/o being lonely and needing more support.

## 2019-10-11 ENCOUNTER — INPATIENT (INPATIENT)
Facility: HOSPITAL | Age: 74
LOS: 34 days | Discharge: HOME | End: 2019-11-15
Attending: PSYCHIATRY & NEUROLOGY | Admitting: PSYCHIATRY & NEUROLOGY
Payer: MEDICARE

## 2019-10-11 ENCOUNTER — TRANSCRIPTION ENCOUNTER (OUTPATIENT)
Age: 74
End: 2019-10-11

## 2019-10-11 VITALS
DIASTOLIC BLOOD PRESSURE: 71 MMHG | HEART RATE: 86 BPM | RESPIRATION RATE: 18 BRPM | SYSTOLIC BLOOD PRESSURE: 144 MMHG | OXYGEN SATURATION: 98 % | TEMPERATURE: 98 F

## 2019-10-11 VITALS — TEMPERATURE: 98 F | RESPIRATION RATE: 18 BRPM | WEIGHT: 202.83 LBS | HEIGHT: 71 IN

## 2019-10-11 DIAGNOSIS — Z95.1 PRESENCE OF AORTOCORONARY BYPASS GRAFT: Chronic | ICD-10-CM

## 2019-10-11 DIAGNOSIS — F25.9 SCHIZOAFFECTIVE DISORDER, UNSPECIFIED: ICD-10-CM

## 2019-10-11 PROBLEM — E78.5 HYPERLIPIDEMIA, UNSPECIFIED: Chronic | Status: ACTIVE | Noted: 2019-10-08

## 2019-10-11 LAB
ALBUMIN SERPL ELPH-MCNC: 3.3 G/DL — SIGNIFICANT CHANGE UP (ref 3.3–5)
ALP SERPL-CCNC: 90 U/L — SIGNIFICANT CHANGE UP (ref 40–120)
ALT FLD-CCNC: 7 U/L — SIGNIFICANT CHANGE UP (ref 4–41)
ANION GAP SERPL CALC-SCNC: 13 MMO/L — SIGNIFICANT CHANGE UP (ref 7–14)
AST SERPL-CCNC: 10 U/L — SIGNIFICANT CHANGE UP (ref 4–40)
BASOPHILS # BLD AUTO: 0.05 K/UL — SIGNIFICANT CHANGE UP (ref 0–0.2)
BASOPHILS NFR BLD AUTO: 0.8 % — SIGNIFICANT CHANGE UP (ref 0–2)
BILIRUB SERPL-MCNC: 0.6 MG/DL — SIGNIFICANT CHANGE UP (ref 0.2–1.2)
BUN SERPL-MCNC: 10 MG/DL — SIGNIFICANT CHANGE UP (ref 7–23)
CALCIUM SERPL-MCNC: 8.6 MG/DL — SIGNIFICANT CHANGE UP (ref 8.4–10.5)
CHLORIDE SERPL-SCNC: 105 MMOL/L — SIGNIFICANT CHANGE UP (ref 98–107)
CO2 SERPL-SCNC: 21 MMOL/L — LOW (ref 22–31)
CREAT SERPL-MCNC: 0.57 MG/DL — SIGNIFICANT CHANGE UP (ref 0.5–1.3)
EOSINOPHIL # BLD AUTO: 0.1 K/UL — SIGNIFICANT CHANGE UP (ref 0–0.5)
EOSINOPHIL NFR BLD AUTO: 1.6 % — SIGNIFICANT CHANGE UP (ref 0–6)
GLUCOSE BLDC GLUCOMTR-MCNC: 172 MG/DL — HIGH (ref 70–99)
GLUCOSE BLDC GLUCOMTR-MCNC: 270 MG/DL — HIGH (ref 70–99)
GLUCOSE SERPL-MCNC: 214 MG/DL — HIGH (ref 70–99)
HCT VFR BLD CALC: 49.8 % — SIGNIFICANT CHANGE UP (ref 39–50)
HGB BLD-MCNC: 15 G/DL — SIGNIFICANT CHANGE UP (ref 13–17)
IMM GRANULOCYTES NFR BLD AUTO: 0.8 % — SIGNIFICANT CHANGE UP (ref 0–1.5)
LYMPHOCYTES # BLD AUTO: 0.76 K/UL — LOW (ref 1–3.3)
LYMPHOCYTES # BLD AUTO: 12.2 % — LOW (ref 13–44)
MAGNESIUM SERPL-MCNC: 1.8 MG/DL — SIGNIFICANT CHANGE UP (ref 1.6–2.6)
MCHC RBC-ENTMCNC: 24.9 PG — LOW (ref 27–34)
MCHC RBC-ENTMCNC: 30.1 % — LOW (ref 32–36)
MCV RBC AUTO: 82.6 FL — SIGNIFICANT CHANGE UP (ref 80–100)
MONOCYTES # BLD AUTO: 0.74 K/UL — SIGNIFICANT CHANGE UP (ref 0–0.9)
MONOCYTES NFR BLD AUTO: 11.9 % — SIGNIFICANT CHANGE UP (ref 2–14)
NEUTROPHILS # BLD AUTO: 4.53 K/UL — SIGNIFICANT CHANGE UP (ref 1.8–7.4)
NEUTROPHILS NFR BLD AUTO: 72.7 % — SIGNIFICANT CHANGE UP (ref 43–77)
NRBC # FLD: 0 K/UL — SIGNIFICANT CHANGE UP (ref 0–0)
PHOSPHATE SERPL-MCNC: 3.2 MG/DL — SIGNIFICANT CHANGE UP (ref 2.5–4.5)
PLATELET # BLD AUTO: 156 K/UL — SIGNIFICANT CHANGE UP (ref 150–400)
PMV BLD: 9.9 FL — SIGNIFICANT CHANGE UP (ref 7–13)
POTASSIUM SERPL-MCNC: 3.8 MMOL/L — SIGNIFICANT CHANGE UP (ref 3.5–5.3)
POTASSIUM SERPL-SCNC: 3.8 MMOL/L — SIGNIFICANT CHANGE UP (ref 3.5–5.3)
PROT SERPL-MCNC: 5.9 G/DL — LOW (ref 6–8.3)
RBC # BLD: 6.03 M/UL — HIGH (ref 4.2–5.8)
RBC # FLD: 21.1 % — HIGH (ref 10.3–14.5)
SODIUM SERPL-SCNC: 139 MMOL/L — SIGNIFICANT CHANGE UP (ref 135–145)
WBC # BLD: 6.23 K/UL — SIGNIFICANT CHANGE UP (ref 3.8–10.5)
WBC # FLD AUTO: 6.23 K/UL — SIGNIFICANT CHANGE UP (ref 3.8–10.5)

## 2019-10-11 PROCEDURE — 99222 1ST HOSP IP/OBS MODERATE 55: CPT | Mod: GC

## 2019-10-11 PROCEDURE — 99233 SBSQ HOSP IP/OBS HIGH 50: CPT

## 2019-10-11 PROCEDURE — 99239 HOSP IP/OBS DSCHRG MGMT >30: CPT

## 2019-10-11 RX ORDER — DIPHENOXYLATE HCL/ATROPINE 2.5-.025MG
1 TABLET ORAL
Qty: 0 | Refills: 0 | DISCHARGE

## 2019-10-11 RX ORDER — LOSARTAN POTASSIUM 100 MG/1
1 TABLET, FILM COATED ORAL
Qty: 0 | Refills: 0 | DISCHARGE

## 2019-10-11 RX ORDER — QUETIAPINE FUMARATE 200 MG/1
1 TABLET, FILM COATED ORAL
Qty: 0 | Refills: 0 | DISCHARGE

## 2019-10-11 RX ORDER — QUETIAPINE FUMARATE 200 MG/1
1 TABLET, FILM COATED ORAL
Qty: 0 | Refills: 0 | DISCHARGE
Start: 2019-10-11

## 2019-10-11 RX ORDER — IBRUTINIB 140 MG/1
1 TABLET, FILM COATED ORAL
Qty: 0 | Refills: 0 | DISCHARGE

## 2019-10-11 RX ORDER — ASPIRIN/CALCIUM CARB/MAGNESIUM 324 MG
1 TABLET ORAL
Qty: 0 | Refills: 0 | DISCHARGE
Start: 2019-10-11

## 2019-10-11 RX ORDER — QUETIAPINE FUMARATE 200 MG/1
25 TABLET, FILM COATED ORAL EVERY 6 HOURS
Refills: 0 | Status: DISCONTINUED | OUTPATIENT
Start: 2019-10-11 | End: 2019-10-16

## 2019-10-11 RX ORDER — METFORMIN HYDROCHLORIDE 850 MG/1
1000 TABLET ORAL
Refills: 0 | Status: DISCONTINUED | OUTPATIENT
Start: 2019-10-11 | End: 2019-11-15

## 2019-10-11 RX ORDER — FAMOTIDINE 10 MG/ML
1 INJECTION INTRAVENOUS
Qty: 0 | Refills: 0 | DISCHARGE

## 2019-10-11 RX ORDER — ROSUVASTATIN CALCIUM 5 MG/1
1 TABLET ORAL
Qty: 0 | Refills: 0 | DISCHARGE

## 2019-10-11 RX ORDER — GLIMEPIRIDE 1 MG
4 TABLET ORAL
Refills: 0 | Status: DISCONTINUED | OUTPATIENT
Start: 2019-10-11 | End: 2019-11-03

## 2019-10-11 RX ORDER — METOPROLOL TARTRATE 50 MG
100 TABLET ORAL DAILY
Refills: 0 | Status: DISCONTINUED | OUTPATIENT
Start: 2019-10-11 | End: 2019-11-15

## 2019-10-11 RX ORDER — CLOPIDOGREL BISULFATE 75 MG/1
75 TABLET, FILM COATED ORAL DAILY
Refills: 0 | Status: DISCONTINUED | OUTPATIENT
Start: 2019-10-11 | End: 2019-11-15

## 2019-10-11 RX ORDER — DEXTROSE 50 % IN WATER 50 %
15 SYRINGE (ML) INTRAVENOUS ONCE
Refills: 0 | Status: DISCONTINUED | OUTPATIENT
Start: 2019-10-11 | End: 2019-11-15

## 2019-10-11 RX ORDER — HALOPERIDOL DECANOATE 100 MG/ML
2 INJECTION INTRAMUSCULAR ONCE
Refills: 0 | Status: DISCONTINUED | OUTPATIENT
Start: 2019-10-11 | End: 2019-11-15

## 2019-10-11 RX ORDER — METOPROLOL TARTRATE 50 MG
1 TABLET ORAL
Qty: 0 | Refills: 0 | DISCHARGE

## 2019-10-11 RX ORDER — ARIPIPRAZOLE 15 MG/1
1 TABLET ORAL
Qty: 0 | Refills: 0 | DISCHARGE

## 2019-10-11 RX ORDER — QUETIAPINE FUMARATE 200 MG/1
100 TABLET, FILM COATED ORAL AT BEDTIME
Refills: 0 | Status: DISCONTINUED | OUTPATIENT
Start: 2019-10-11 | End: 2019-10-16

## 2019-10-11 RX ORDER — PERPHENAZINE 8 MG/1
1 TABLET, FILM COATED ORAL
Qty: 0 | Refills: 0 | DISCHARGE

## 2019-10-11 RX ORDER — ASPIRIN/CALCIUM CARB/MAGNESIUM 324 MG
81 TABLET ORAL DAILY
Refills: 0 | Status: DISCONTINUED | OUTPATIENT
Start: 2019-10-11 | End: 2019-11-15

## 2019-10-11 RX ORDER — GLUCAGON INJECTION, SOLUTION 0.5 MG/.1ML
1 INJECTION, SOLUTION SUBCUTANEOUS ONCE
Refills: 0 | Status: DISCONTINUED | OUTPATIENT
Start: 2019-10-11 | End: 2019-11-15

## 2019-10-11 RX ORDER — DEXTROSE 50 % IN WATER 50 %
25 SYRINGE (ML) INTRAVENOUS ONCE
Refills: 0 | Status: DISCONTINUED | OUTPATIENT
Start: 2019-10-11 | End: 2019-11-15

## 2019-10-11 RX ORDER — SODIUM CHLORIDE 9 MG/ML
1000 INJECTION, SOLUTION INTRAVENOUS
Refills: 0 | Status: DISCONTINUED | OUTPATIENT
Start: 2019-10-11 | End: 2019-11-15

## 2019-10-11 RX ORDER — DEXTROSE 50 % IN WATER 50 %
12.5 SYRINGE (ML) INTRAVENOUS ONCE
Refills: 0 | Status: DISCONTINUED | OUTPATIENT
Start: 2019-10-11 | End: 2019-11-15

## 2019-10-11 RX ORDER — ATORVASTATIN CALCIUM 80 MG/1
1 TABLET, FILM COATED ORAL
Qty: 0 | Refills: 0 | DISCHARGE
Start: 2019-10-11

## 2019-10-11 RX ORDER — AMLODIPINE BESYLATE 2.5 MG/1
5 TABLET ORAL DAILY
Refills: 0 | Status: DISCONTINUED | OUTPATIENT
Start: 2019-10-11 | End: 2019-11-15

## 2019-10-11 RX ORDER — ATORVASTATIN CALCIUM 80 MG/1
80 TABLET, FILM COATED ORAL AT BEDTIME
Refills: 0 | Status: DISCONTINUED | OUTPATIENT
Start: 2019-10-11 | End: 2019-11-15

## 2019-10-11 RX ORDER — HALOPERIDOL DECANOATE 100 MG/ML
2 INJECTION INTRAMUSCULAR EVERY 6 HOURS
Refills: 0 | Status: DISCONTINUED | OUTPATIENT
Start: 2019-10-11 | End: 2019-11-15

## 2019-10-11 RX ORDER — METOPROLOL TARTRATE 50 MG
1 TABLET ORAL
Qty: 0 | Refills: 0 | DISCHARGE
Start: 2019-10-11

## 2019-10-11 RX ORDER — AMLODIPINE BESYLATE 2.5 MG/1
1 TABLET ORAL
Qty: 0 | Refills: 0 | DISCHARGE
Start: 2019-10-11

## 2019-10-11 RX ORDER — CLOPIDOGREL BISULFATE 75 MG/1
1 TABLET, FILM COATED ORAL
Qty: 0 | Refills: 0 | DISCHARGE
Start: 2019-10-11

## 2019-10-11 RX ORDER — ALBUTEROL 90 UG/1
2 AEROSOL, METERED ORAL EVERY 6 HOURS
Refills: 0 | Status: DISCONTINUED | OUTPATIENT
Start: 2019-10-11 | End: 2019-11-15

## 2019-10-11 RX ORDER — GABAPENTIN 400 MG/1
600 CAPSULE ORAL THREE TIMES A DAY
Refills: 0 | Status: DISCONTINUED | OUTPATIENT
Start: 2019-10-11 | End: 2019-10-15

## 2019-10-11 RX ORDER — INFLUENZA VIRUS VACCINE 15; 15; 15; 15 UG/.5ML; UG/.5ML; UG/.5ML; UG/.5ML
0.5 SUSPENSION INTRAMUSCULAR ONCE
Refills: 0 | Status: DISCONTINUED | OUTPATIENT
Start: 2019-10-11 | End: 2019-10-11

## 2019-10-11 RX ORDER — GABAPENTIN 400 MG/1
1 CAPSULE ORAL
Qty: 0 | Refills: 0 | DISCHARGE

## 2019-10-11 RX ORDER — GABAPENTIN 400 MG/1
1 CAPSULE ORAL
Qty: 0 | Refills: 0 | DISCHARGE
Start: 2019-10-11

## 2019-10-11 RX ADMIN — QUETIAPINE FUMARATE 100 MILLIGRAM(S): 200 TABLET, FILM COATED ORAL at 21:53

## 2019-10-11 RX ADMIN — CLOPIDOGREL BISULFATE 75 MILLIGRAM(S): 75 TABLET, FILM COATED ORAL at 12:38

## 2019-10-11 RX ADMIN — QUETIAPINE FUMARATE 25 MILLIGRAM(S): 200 TABLET, FILM COATED ORAL at 23:35

## 2019-10-11 RX ADMIN — Medication 1: at 07:54

## 2019-10-11 RX ADMIN — ATORVASTATIN CALCIUM 80 MILLIGRAM(S): 80 TABLET, FILM COATED ORAL at 21:53

## 2019-10-11 RX ADMIN — Medication 81 MILLIGRAM(S): at 12:38

## 2019-10-11 RX ADMIN — ENOXAPARIN SODIUM 40 MILLIGRAM(S): 100 INJECTION SUBCUTANEOUS at 12:38

## 2019-10-11 RX ADMIN — Medication 2: at 11:50

## 2019-10-11 RX ADMIN — GABAPENTIN 600 MILLIGRAM(S): 400 CAPSULE ORAL at 13:18

## 2019-10-11 RX ADMIN — GABAPENTIN 600 MILLIGRAM(S): 400 CAPSULE ORAL at 06:23

## 2019-10-11 RX ADMIN — METFORMIN HYDROCHLORIDE 1000 MILLIGRAM(S): 850 TABLET ORAL at 21:53

## 2019-10-11 RX ADMIN — QUETIAPINE FUMARATE 50 MILLIGRAM(S): 200 TABLET, FILM COATED ORAL at 07:54

## 2019-10-11 RX ADMIN — Medication 3: at 16:51

## 2019-10-11 RX ADMIN — QUETIAPINE FUMARATE 50 MILLIGRAM(S): 200 TABLET, FILM COATED ORAL at 12:38

## 2019-10-11 RX ADMIN — GABAPENTIN 600 MILLIGRAM(S): 400 CAPSULE ORAL at 21:53

## 2019-10-11 NOTE — PROGRESS NOTE BEHAVIORAL HEALTH - PROBLEM SELECTOR PLAN 1
SW to set up transfer to [facility] pending outcome from patient's oncologist regarding Ca treatment SUZIE to set up transfer to [facility] pending medical clearance

## 2019-10-11 NOTE — PROGRESS NOTE BEHAVIORAL HEALTH - AXIS III
HTN, CAD s/p CABG, DM2, SUSY, CLL on chemo, spinal stenosis, obesity, diabetic neuropathy

## 2019-10-11 NOTE — PROGRESS NOTE ADULT - PROBLEM SELECTOR PLAN 1
- S/p ativan and seroquel in the ED. Evaluated by psych, placed on seroquel q6h prn and 100mg qhs  - remains intermittently agitated  - per psych, seroquel 50mg at 8am, 1pm and 100mg qhs

## 2019-10-11 NOTE — PROGRESS NOTE BEHAVIORAL HEALTH - NSBHFUPINTERVALCCFT_PSY_A_CORE
"I don't like it here, I want to go to [facility]." Patient received Zyprexa 2.5mg IM @ 1500 on 10/10/2019

## 2019-10-11 NOTE — PROGRESS NOTE BEHAVIORAL HEALTH - RISK ASSESSMENT
low:   Protective factors; no history of SI/SA, future oriented, treatment seeking, wants to be placed  Risk factors; Passive SI, His prior reports of passive SI were in context of asking to be placed in a facility, h/o psych. admissions, lives alone, poor social support, h/o anxiety, "h.o schizophrenia    Not at acute risk of harm to self or others at this time and not acutely psychotic,  does not meet criteria for involuntary admission at this time. Pt. is asking to be placed in a facility vs NH, he thinks he needs more help and cannot function at home. pt. will benefit from structured/supervised living setting vs additional services. low:   Protective factors; no history of SI/SA, future oriented, treatment seeking, wants to be placed  Risk factors; Passive SI, His prior reports of passive SI were in context of asking to be placed in a facility, h/o psych. admissions, lives alone, poor social support, h/o anxiety, "h.o schizophrenia    Pt. is asking to be placed in a facility vs NH, he thinks he needs more help and cannot function at home. pt. will benefit from structured/supervised living setting vs additional services.    Patient has been extremely anxious, paranoid, at times hearing voices, aggressive, agitated, not able to function at home, requiring  PRNs. Given severe emotional lability, anxiety, and extremely concerning collateral, patient will benefit from psychiatric admission for inability to care for himself and for further stabilization and treatment.

## 2019-10-11 NOTE — PROGRESS NOTE ADULT - PROBLEM SELECTOR PLAN 2
- According to outpatient record in chart, patient completed last dose of chemo in clinic 10/8/19  - seen by oncology here, appreciate input, holding ibrutinib during active psychiatric issues, pt to follow up outpatient with oncology after psychiatric issues are stable for evaluation for resuming ibrutinib

## 2019-10-11 NOTE — PROGRESS NOTE BEHAVIORAL HEALTH - NSBHCHARTREVIEWVS_PSY_A_CORE FT
Vital Signs Last 24 Hrs  T(C): 36.4 (10 Oct 2019 13:07), Max: 36.5 (10 Oct 2019 05:45)  T(F): 97.5 (10 Oct 2019 13:07), Max: 97.7 (10 Oct 2019 05:45)  HR: 78 (10 Oct 2019 13:07) (78 - 83)  BP: 143/65 (10 Oct 2019 13:07) (125/60 - 143/65)  BP(mean): --  RR: 16 (10 Oct 2019 13:07) (16 - 18)  SpO2: 97% (10 Oct 2019 13:07) (97% - 98%)
Vital Signs Last 24 Hrs  T(C): 36.7 (11 Oct 2019 06:21), Max: 36.7 (10 Oct 2019 20:49)  T(F): 98 (11 Oct 2019 06:21), Max: 98.1 (10 Oct 2019 20:49)  HR: 59 (11 Oct 2019 06:21) (59 - 78)  BP: 118/60 (11 Oct 2019 06:21) (118/60 - 143/65)  BP(mean): --  RR: 18 (11 Oct 2019 06:21) (16 - 18)  SpO2: 100% (11 Oct 2019 06:21) (97% - 100%)
Vital Signs Last 24 Hrs  T(C): 36.2 (09 Oct 2019 06:05), Max: 36.7 (08 Oct 2019 15:11)  T(F): 97.2 (09 Oct 2019 06:05), Max: 98 (08 Oct 2019 15:11)  HR: 69 (09 Oct 2019 06:05) (69 - 94)  BP: 155/73 (09 Oct 2019 06:05) (155/73 - 179/81)  BP(mean): --  RR: 18 (09 Oct 2019 06:05) (16 - 18)  SpO2: 99% (09 Oct 2019 06:05) (98% - 100%)

## 2019-10-11 NOTE — PROGRESS NOTE ADULT - ASSESSMENT
75 yo M pmh schizoaffective, CLL on chemo, DM2, HTN, HDL, CABG x 2 with presented with agitation and feelings of depression, now pending transfer to [facility].

## 2019-10-11 NOTE — PROGRESS NOTE BEHAVIORAL HEALTH - SUMMARY
Pt is a 75yo SWM, domiciled alone,  as per chart has h/of ? Schizophrenia (vs Schizoaffective D/O?), anxiety dos, AMBERLY, with  prior inpt psych admits at [facility]  (none since 2014), not currently in outpatient psychiatric treatment, hx/o SUSY, CAD s/p CABG, Type 2 DM, HTN, diabetic neuropathy, obesity, spinal stenosis, CLL (undergoing chemo therapy with Dr. Gallo [phone]), was BIBEMS after pt called 911 asking to be taken to the hospital. Patient currently presents with baseline irritability, no acute psychiatric symptoms. He is requesting placing to assisted living or a nursing home. He is not appropriate for a psychiatric admission as he does not meet criteria for involuntary hospitalization.      Patient seen and evaluated at bedside, AAOX3. Patient perseverating on going to NH vs Assisted living facility vs going to [facility], able to redirect patient with focus on alleviating his anxiety through medication changes, and allowing staff to work on his placement,  verbalized understanding. Discussed with  regrading placement and need to maximize medication for symptoms of anxiety.  Informed POOJA Golden of following rec:    10/11: Patient seen and evaluated at bedside. Patient initially irritable, angry and  yelling loudly, stating he wanted to go to [facility]. Patient able to be redirected, discussed voluntary status to [facility], signed 9.13,  aware, awaiting details of Ca treatment from outside oncologist. Patient denies si/hi, states hears voices but "thoughts could be my own." States slept well and has a good appetite.     Plan:  -Seroquel 50mg PO @ 0700 & Seroquel 50mg PO @ 1300  -Seroquel 100mg po hs  -c/w Seroquel 50mg po q6h prn for mild agitation  -c/w Zyprexa 2.5mg IM q6h prn for severe agitation.   -Monitor EKG for qtc-hold psychotropics if qtc >500  -    Will follow Pt is a 75yo SWM, domiciled alone,  as per chart has h/of ? Schizophrenia (vs Schizoaffective D/O?), anxiety dos, AMBERLY, with  prior inpt psych admits at [facility]  (none since 2014), not currently in outpatient psychiatric treatment, hx/o SUSY, CAD s/p CABG, Type 2 DM, HTN, diabetic neuropathy, obesity, spinal stenosis, CLL (undergoing chemo therapy with Dr. Gallo [phone]), was BIBEMS after pt called 911 asking to be taken to the hospital. Patient currently presents with baseline irritability, no acute psychiatric symptoms. He is requesting placing to assisted living or a nursing home. He is not appropriate for a psychiatric admission as he does not meet criteria for involuntary hospitalization.      Patient seen and evaluated at bedside, AAOX3. Patient perseverating on going to NH vs Assisted living facility vs going to [facility], able to redirect patient with focus on alleviating his anxiety through medication changes, and allowing staff to work on his placement,  verbalized understanding. Discussed with  regrading placement and need to maximize medication for symptoms of anxiety.  Informed POOJA Golden of following rec:    10/11: Patient seen and evaluated at bedside. Patient initially irritable, angry and  yelling loudly, stating he wanted to go to [facility]. Patient able to be redirected, discussed voluntary status to [facility], signed 9.13,  aware, awaiting details of Ca treatment from outside oncologist. Patient denies si/hi, states hears voices but "thoughts could be my own." Denies SI but repeatedly stated that " I want death to come to me". States slept well and has a good appetite.     Patient has been extremely anxious, paranoid, at times hearing voices, aggressive, easily agitated, labile, requiring PRNS.  Given severe emotional lability, anxiety, and extremely concerning collateral, patient will benefit from psychiatric inpatient admission pending medical clearance, pt. needs further stabilization and treatment.    Plan:  -Seroquel 50mg PO @ 0700 & Seroquel 50mg PO @ 1300  -Seroquel 100mg po hs  -c/w Seroquel 50mg po q6h prn for mild agitation  -c/w Zyprexa 2.5mg IM q6h prn for severe agitation.   -Monitor EKG for qtc-hold psychotropics if qtc >500  -    Will follow

## 2019-10-11 NOTE — CHART NOTE - NSCHARTNOTEFT_GEN_A_CORE
d/w medical attending regarding diabetes management upon dc to [facility] continue on oral home regimen.  Continue to monitor glucose and adjust meds accordingly.

## 2019-10-11 NOTE — PROGRESS NOTE BEHAVIORAL HEALTH - CASE SUMMARY
Patient seen and evaluated, agree with above assessment and plan, pt. AAOX3, uncooperative, agitated, yelling, screaming, staff reported that patient threw his cane and becomes belligerent and loud. Patient repeatedly stated " I want death to come to me ", he denies active SI/I/P, denies HI. Reported hearing voices however unable to give details, stated that he is feeling paranoid about the staff.  Given severe emotional lability, anxiety, paranoia, unable to function at home, and extremely concerning collateral, patient will benefit from psychiatric inpatient admission pending medical clearance. Patient needs further stabilization and treatment. Recommend meds as written above, Case discussed with SUZIE and SHEILA Pappas, will follow

## 2019-10-11 NOTE — PROGRESS NOTE BEHAVIORAL HEALTH - ADDITIONAL DETAILS / COMMENTS
discussed with patient voluntary status to [facility], signed 9.13
discussed with patient changes to his medication regime to help alleviate his anxiety
patient requesting medication management to treat his anxiety and depression, states non compliant in past

## 2019-10-11 NOTE — PROGRESS NOTE ADULT - ATTENDING COMMENTS
Medically stable for transfer to [facility]. Ibrutinib on hold per oncology, outpatient oncology follow up.     Daly Kinney MD  Hospitalist  pager #[phone]

## 2019-10-11 NOTE — DISCHARGE NOTE NURSING/CASE MANAGEMENT/SOCIAL WORK - PATIENT PORTAL LINK FT
You can access the FollowMyHealth Patient Portal offered by [facility] by registering at the following website: [email or web address]. By joining [employer]’s FollowMyHealth portal, you will also be able to view your health information using other applications (apps) compatible with our system.

## 2019-10-11 NOTE — PROGRESS NOTE BEHAVIORAL HEALTH - PRIMARY DX
AMBERLY (generalized anxiety disorder)

## 2019-10-11 NOTE — PROGRESS NOTE BEHAVIORAL HEALTH - NSBHCHARTREVIEWLAB_PSY_A_CORE FT
CBC Full  -  ( 08 Oct 2019 15:50 )  WBC Count : 11.32 K/uL  RBC Count : 6.29 M/uL  Hemoglobin : 15.5 g/dL  Hematocrit : 51.5 %  Platelet Count - Automated : 270 K/uL  Mean Cell Volume : 81.9 fL  Mean Cell Hemoglobin : 24.6 pg  Mean Cell Hemoglobin Concentration : 30.1 %  Auto Neutrophil # : 10.48 K/uL  Auto Lymphocyte # : 0.44 K/uL  Auto Monocyte # : 0.32 K/uL  Auto Eosinophil # : 0.00 K/uL  Auto Basophil # : 0.04 K/uL  Auto Neutrophil % : 92.5 %  Auto Lymphocyte % : 3.9 %  Auto Monocyte % : 2.8 %  Auto Eosinophil % : 0.0 %  Auto Basophil % : 0.4 %  10-08    138  |  103  |  11  ----------------------------<  311<H>  4.2   |  19<L>  |  0.72    Ca    9.3      08 Oct 2019 15:50    TPro  7.4  /  Alb  4.2  /  TBili  0.8  /  DBili  x   /  AST  15  /  ALT  11  /  AlkPhos  106  10-08
CBC Full  -  ( 11 Oct 2019 06:10 )  WBC Count : 6.23 K/uL  RBC Count : 6.03 M/uL  Hemoglobin : 15.0 g/dL  Hematocrit : 49.8 %  Platelet Count - Automated : 156 K/uL  Mean Cell Volume : 82.6 fL  Mean Cell Hemoglobin : 24.9 pg  Mean Cell Hemoglobin Concentration : 30.1 %  Auto Neutrophil # : 4.53 K/uL  Auto Lymphocyte # : 0.76 K/uL  Auto Monocyte # : 0.74 K/uL  Auto Eosinophil # : 0.10 K/uL  Auto Basophil # : 0.05 K/uL  Auto Neutrophil % : 72.7 %  Auto Lymphocyte % : 12.2 %  Auto Monocyte % : 11.9 %  Auto Eosinophil % : 1.6 %  Auto Basophil % : 0.8 %  10-11    139  |  105  |  10  ----------------------------<  214<H>  3.8   |  21<L>  |  0.57    Ca    8.6      11 Oct 2019 06:10  Phos  3.2     10-11  Mg     1.8     10-11    TPro  5.9<L>  /  Alb  3.3  /  TBili  0.6  /  DBili  x   /  AST  10  /  ALT  7   /  AlkPhos  90  10-11
CBC Full  -  ( 08 Oct 2019 15:50 )  WBC Count : 11.32 K/uL  RBC Count : 6.29 M/uL  Hemoglobin : 15.5 g/dL  Hematocrit : 51.5 %  Platelet Count - Automated : 270 K/uL  Mean Cell Volume : 81.9 fL  Mean Cell Hemoglobin : 24.6 pg  Mean Cell Hemoglobin Concentration : 30.1 %  Auto Neutrophil # : 10.48 K/uL  Auto Lymphocyte # : 0.44 K/uL  Auto Monocyte # : 0.32 K/uL  Auto Eosinophil # : 0.00 K/uL  Auto Basophil # : 0.04 K/uL  Auto Neutrophil % : 92.5 %  Auto Lymphocyte % : 3.9 %  Auto Monocyte % : 2.8 %  Auto Eosinophil % : 0.0 %  Auto Basophil % : 0.4 %  10-08    138  |  103  |  11  ----------------------------<  311<H>  4.2   |  19<L>  |  0.72    Ca    9.3      08 Oct 2019 15:50    TPro  7.4  /  Alb  4.2  /  TBili  0.8  /  DBili  x   /  AST  15  /  ALT  11  /  AlkPhos  106  10-08

## 2019-10-11 NOTE — PROGRESS NOTE BEHAVIORAL HEALTH - NSBHFUPINTERVALHXFT_PSY_A_CORE
EMERGENCY DEPARTMENT HISTORY AND PHYSICAL EXAM 
 
 
Date: 9/26/2018 Patient Name: Maurice Jimenez History of Presenting Illness Chief Complaint Patient presents with  Fall  
  pt reports she had been at the [facility] to work out, went out to car to get her purse and turned around and fell, denies dizziness or LOC, reports when she turned her feet did not and caused her to fall, hit head, laceration to right eye, left side of forehead, injury to right thumb nail  Laceration  Head Injury History Provided By: Patient HPI: Maurice Jimenez, 82 y.o. female with PMHx significant for HLD, renal calculi, and arthritis, presents ambulatory to the ED with cc of forehead laceration after a fall sustained at 0945. She states that she was leaving the [facility] after a great work up and fell in the parking lot. She was turning quickly and tripped. No LOC. She returned home and showered before presenting to the ED. Other injuries include scrapes to the bilateral thumbs and right eye. Pt denies fevers, chills, night sweats, chest pain, pressure, SOB, CORREA, PND, orthopnea, abdominal pain, n/v/d, melena, hematuria, dysuria, constipation, HA, dizziness, and syncope. Last tetanus unknown. PCP: Lazarus Marry, MD 
 
Current Facility-Administered Medications Medication Dose Route Frequency Provider Last Rate Last Dose  bacitracin 500 unit/gram packet 1 Packet  1 Packet Topical NOW Jarret Baker NP Current Outpatient Prescriptions Medication Sig Dispense Refill  acetaminophen (TYLENOL) 325 mg tablet Take 325 mg by mouth every four (4) hours as needed. Indications: HEADACHE DISORDER    
 simvastatin (ZOCOR) 20 mg tablet Take 20 mg by mouth nightly.  MULTIVITS W-FE,OTHER MIN (CENTRUM PO) Take 1 Tab by mouth daily.  CALCIUM CARBONATE (CALCIUM 500 PO) Take 500 mg by mouth Daily (before breakfast).     
 oxycodone (ROXICODONE) 5 mg immediate release tablet Take 1-3 Tabs by mouth every three (3) hours as needed for Pain. 150 Tab 0  
 diazepam (VALIUM) 5 mg tablet Take 1 Tab by mouth every eight (8) hours as needed for Anxiety. 90 Tab 3  
 ondansetron (ZOFRAN ODT) 4 mg disintegrating tablet Take 1 Tab by mouth every six (6) hours as needed for Nausea. 10 Tab 1 Past History Past Medical History: 
Past Medical History:  
Diagnosis Date  Arthritis  History of colonoscopy with polypectomy   
 benign  History of kidney stones   
 passed in past  
 Hyperlipemia  Kidney stones Past Surgical History: 
Past Surgical History:  
Procedure Laterality Date  HX ORTHOPAEDIC    
 gregorio TKR  HX ORTHOPAEDIC    
 rt 4th finger trigger release  HX DANNIE AND BSO  HX TONSILLECTOMY  HX UROLOGICAL    
 collegen implant & sling insertion Family History: 
Family History Problem Relation Age of Onset  Other Other No cancer Social History: 
Social History Substance Use Topics  Smoking status: Never Smoker  Smokeless tobacco: None  Alcohol use No  
 
 
Allergies: 
No Known Allergies Review of Systems Review of Systems Constitutional: Negative for activity change, appetite change, chills, diaphoresis, fatigue, fever and unexpected weight change. HENT: Negative for congestion, ear pain, rhinorrhea, sinus pressure, sore throat and tinnitus. Eyes: Negative for photophobia, pain, discharge and visual disturbance. Respiratory: Negative for apnea, cough, choking, chest tightness, shortness of breath, wheezing and stridor. Cardiovascular: Negative for chest pain, palpitations and leg swelling. Gastrointestinal: Negative for abdominal pain, constipation, diarrhea, nausea and vomiting. Endocrine: Negative for polydipsia, polyphagia and polyuria. Genitourinary: Negative for decreased urine volume, dyspareunia, dysuria, enuresis, flank pain, frequency, hematuria and urgency. Musculoskeletal: Negative for arthralgias, back pain, gait problem, myalgias and neck pain. Skin: Positive for wound. Negative for color change, pallor and rash. Allergic/Immunologic: Negative for immunocompromised state. Neurological: Negative for dizziness, seizures, syncope, weakness, light-headedness and headaches. Hematological: Does not bruise/bleed easily. Psychiatric/Behavioral: Negative for agitation and confusion. The patient is not nervous/anxious. Physical Exam  
Physical Exam  
Constitutional: She is oriented to person, place, and time. She appears well-developed and well-nourished. No distress. HENT:  
Head: Normocephalic. Right Ear: External ear normal.  
Left Ear: External ear normal.  
Mouth/Throat: Oropharynx is clear and moist. No oropharyngeal exudate. Eyes: Conjunctivae and EOM are normal. Pupils are equal, round, and reactive to light. Right eye exhibits no discharge. Left eye exhibits no discharge. No scleral icterus. Neck: Normal range of motion and full passive range of motion without pain. Neck supple. No JVD present. No spinous process tenderness and no muscular tenderness present. No rigidity. No tracheal deviation, no edema, no erythema and normal range of motion present. No thyromegaly present. Cardiovascular: Normal rate, regular rhythm, normal heart sounds and intact distal pulses. Exam reveals no gallop and no friction rub. No murmur heard. Pulmonary/Chest: Effort normal and breath sounds normal. No stridor. No respiratory distress. She has no wheezes. She has no rales. She exhibits no tenderness. Abdominal: Soft. Bowel sounds are normal. She exhibits no distension and no mass. There is no tenderness. There is no rebound and no guarding. Musculoskeletal: Normal range of motion. She exhibits no edema or tenderness. Lymphadenopathy:  
  She has no cervical adenopathy. Neurological: She is alert and oriented to person, place, and time.  She displays normal reflexes. No cranial nerve deficit. Coordination normal.  
Skin: Skin is warm and dry. No rash noted. She is not diaphoretic. No erythema. No pallor. Psychiatric: She has a normal mood and affect. Her behavior is normal. Judgment and thought content normal.  
Nursing note and vitals reviewed. Diagnostic Study Results Labs - No results found for this or any previous visit (from the past 12 hour(s)). Radiologic Studies -  
CT SPINE CERV WO CONT Final Result CT HEAD WO CONT Final Result CT Results  (Last 48 hours) 09/26/18 1247  CT HEAD WO CONT Final result Impression:  IMPRESSION:  
   
No acute traumatic injury. Narrative:  INDICATION:   fall EXAMINATION:  CT HEAD WO CONTRAST  
   
COMPARISON:  None TECHNIQUE:  Routine noncontrast axial head CT was performed. Sagittal and  
coronal reconstructions were generated. CT dose reduction was achieved through  
use of a standardized protocol tailored for this examination and automatic  
exposure control for dose modulation. Ethelene Gauss FINDINGS:  
   
Ventricles:  Midline, no hydrocephalus. Intracranial Hemorrhage:  None. Brain Parenchyma/Brainstem:  Normal for age. Basal Cisterns:  Normal.   
Paranasal Sinuses:  Visualized sinuses are clear. Soft Tissues:  No significant soft tissue swelling. Osseous Structures:  No acute fracture. Additional Comments:  N/A.   
   
  
 09/26/18 1247  CT SPINE CERV WO CONT Final result Impression:  IMPRESSION:  
   
Postsurgical changes as above. No acute fracture. Narrative:  INDICATION:   fall COMPARISON: None. TECHNIQUE:   Noncontrast axial CT imaging of the cervical spine was performed. Coronal and sagittal reconstructions were obtained. CT dose reduction was achieved through use of a standardized protocol tailored  
for this examination and automatic exposure control for dose modulation. FINDINGS:  
   
There is no acute fracture or subluxation. Accentuated lordosis of the cervical  
spine. Prior anterior cervical fusion C6-7 with intact vertebral body screws,  
fixation plate and interbody graft. No prevertebral soft tissue swelling. Lung  
apices are clear. Mild calcific atherosclerosis bilateral carotid bifurcations. CXR Results  (Last 48 hours) None Medical Decision Making I am the first provider for this patient. I reviewed the vital signs, available nursing notes, past medical history, past surgical history, family history and social history. Vital Signs-Reviewed the patient's vital signs. Patient Vitals for the past 12 hrs: 
 Temp Pulse Resp BP SpO2  
09/26/18 1128 97.9 °F (36.6 °C) 82 18 153/75 98 % Pulse Oximetry Analysis - 98% on RA Records Reviewed: Nursing Notes and Old Medical Records Provider Notes (Medical Decision Making): Laceration [facility] 
 
CT head and cervical spine Wound repair Update tetanus WOUND REPAIR Date/Time: 9/26/2018 2:20 PM 
Performed by: NPPreparation: skin prepped with Betadine Location details: scalp Wound length:2.5 cm or less Anesthesia: local infiltration Anesthesia: 
Local Anesthetic: lidocaine 1% without epinephrine Irrigation solution: saline Skin closure: 6-0 nylon Number of sutures: 8 Technique: simple Approximation: close Dressing: antibiotic ointment and non-adhesive packing strip Patient tolerance: Patient tolerated the procedure well with no immediate complications My total time at bedside, performing this procedure was 16-30 minutes. Comments: 2 sutures to right eye brow 6 sutures to forehead ED Course:  
Initial assessment performed. The patients presenting problems have been discussed, and they are in agreement with the care plan formulated and outlined with them. I have encouraged them to ask questions as they arise throughout their visit. Disposition: Patient seen and evaluated at bedside. Patient seems to be intellectually limited. A&O X3, extremely loud and angry initially, stating he wanted to go to [facility], staff report patient threw his cane and becomes belligerent and loud. Pt denies si/hi, denies thoughts to harm others, has no issues sleeping or eating at present. Denies SA in the past. Pt. does not appear internally preoccupied.  When asked about AH, he was vague and stated, "I think my thoughts could be the voices I hear, but I also hear some kind of music." Denies CAH. Discussed with patient transfer to [facility], patient willingly signed 9.13.     Social work to follow with patient's oncologist regarding Ca treatment prior to transfer. Discharge to home PLAN: 
1. Discharge Medication List as of 9/26/2018  1:54 PM  
  
 
2. Follow-up Information Follow up With Details Comments Contact Info Sulaiman Chahal MD In 1 week  [address] [facility] 
[phone] [facility] EMERGENCY DEPT  As needed, If symptoms worsen [address] [facility] 
[phone] Return to ED if worse Diagnosis Clinical Impression: 1. Laceration of scalp, initial encounter 2. Fall, initial encounter 3. Closed head injury, initial encounter Attestations: 
 
Leonard Mckoy NP 
2:20 PM 
 
 
 
 
 Patient seen and evaluated at bedside. Patient seems to be intellectually limited. A&O X3, extremely loud and angry initially, stating he wanted to go to [facility], staff report patient threw his cane and becomes belligerent and loud. Pt denies si/hi, denies thoughts to harm others, however he repeatedly stated " I want death to come to me".  no issues sleeping or eating at present. Denies SA in the past. When asked about AH, he was vague and stated, "I think my thoughts could be the voices I hear, but I also hear some kind of music." Denies CAH.  Patient stated that he is feeling hopeless, anxious and depressed. He further stated that he feeling very scared and paranoid.. Discussed with patient transfer to [facility], patient willingly signed 9.13.     Team to follow with patient's oncologist regarding Ca treatment prior to transfer.    Collateral obtained on 10/10: Dr. Cancino (palliative care physician) called from [facility] [phone] : He stated that patient recently had a breakdown during his treatment., he was talking about death, was not verbally redirectable, labile, started to disturb other patients, crying and screaming, and received Ativan 1mg for anxiety. He further stated that he is progressively declining and has pervasive anxiety. He has been on Seroquel by his primary physician. Patient seen and evaluated at bedside. Patient seems to be intellectually limited. A&O X3, extremely loud and angry initially, stating he wanted to go to [facility], staff report patient threw his cane and becomes belligerent and loud. Pt denies si/hi, denies thoughts to harm others, however he repeatedly stated " I want death to come to me".  no issues sleeping or eating at present. Denies SA in the past. When asked about AH, he was vague and stated, "I think my thoughts could be the voices I hear, but I also hear some kind of music." Denies CAH.  Patient stated that he is feeling hopeless, anxious and depressed. He further stated that he feeling very scared and paranoid.. Discussed with patient transfer to [facility], patient willingly signed 9.13.     Team to follow with patient's oncologist regarding Ca treatment prior to transfer.    Spoke with pt.'s oncologist Dr. Gallo at [facility] [phone]: informed him about the [facility] transfer.      Collateral obtained on 10/10: Dr. Cancino (palliative care physician) called from [facility] [phone] : He stated that patient recently had a breakdown during his treatment., he was talking about death, was not verbally redirectable, labile, started to disturb other patients, crying and screaming, and received Ativan 1mg for anxiety. He further stated that he is progressively declining and has pervasive anxiety. He has been on Seroquel by his primary physician.

## 2019-10-11 NOTE — PROGRESS NOTE ADULT - SUBJECTIVE AND OBJECTIVE BOX
Chief Complaint: Patient is a 74y old  Male who presents with a chief complaint of Agitation (09 Oct 2019 08:26)    INTERVAL HPI/OVERNIGHT EVENTS: Agitated, wishing to go to [facility]. Not able to elicit other ROS/symptoms.    MEDICATIONS  (STANDING):  amLODIPine   Tablet 5 milliGRAM(s) Oral daily  aspirin enteric coated 81 milliGRAM(s) Oral daily  atorvastatin 80 milliGRAM(s) Oral at bedtime  clopidogrel Tablet 75 milliGRAM(s) Oral daily  dextrose 5%. 1000 milliLiter(s) (50 mL/Hr) IV Continuous <Continuous>  dextrose 50% Injectable 12.5 Gram(s) IV Push once  dextrose 50% Injectable 25 Gram(s) IV Push once  dextrose 50% Injectable 25 Gram(s) IV Push once  enoxaparin Injectable 40 milliGRAM(s) SubCutaneous daily  gabapentin 600 milliGRAM(s) Oral three times a day  influenza   Vaccine 0.5 milliLiter(s) IntraMuscular once  insulin lispro (HumaLOG) corrective regimen sliding scale   SubCutaneous three times a day before meals  insulin lispro (HumaLOG) corrective regimen sliding scale   SubCutaneous at bedtime  metoprolol succinate  milliGRAM(s) Oral daily  QUEtiapine 50 milliGRAM(s) Oral <User Schedule>  QUEtiapine 100 milliGRAM(s) Oral at bedtime    MEDICATIONS  (PRN):  dextrose 40% Gel 15 Gram(s) Oral once PRN Blood Glucose LESS THAN 70 milliGRAM(s)/deciliter  glucagon  Injectable 1 milliGRAM(s) IntraMuscular once PRN Glucose LESS THAN 70 milligrams/deciliter  OLANZapine Injectable 2.5 milliGRAM(s) IntraMuscular every 6 hours PRN SEVERE agitation  QUEtiapine 50 milliGRAM(s) Oral every 6 hours PRN agitation      Vital Signs Last 24 Hrs  T(C): 36.4 (11 Oct 2019 14:23), Max: 36.7 (10 Oct 2019 20:49)  T(F): 97.5 (11 Oct 2019 14:23), Max: 98.1 (10 Oct 2019 20:49)  HR: 86 (11 Oct 2019 14:23) (59 - 86)  BP: 144/71 (11 Oct 2019 14:23) (118/60 - 144/71)  BP(mean): --  RR: 18 (11 Oct 2019 14:23) (18 - 18)  SpO2: 98% (11 Oct 2019 14:23) (98% - 100%)    I&O's Detail    10 Oct 2019 07:01  -  11 Oct 2019 07:00  --------------------------------------------------------  IN:    Oral Fluid: 1445 mL  Total IN: 1445 mL    OUT:  Total OUT: 0 mL    Total NET: 1445 mL    11 Oct 2019 07:01  -  11 Oct 2019 14:58  --------------------------------------------------------  IN:    Oral Fluid: 236 mL  Total IN: 236 mL    OUT:  Total OUT: 0 mL    Total NET: 236 mL    CAPILLARY BLOOD GLUCOSE  POCT Blood Glucose.: 239 mg/dL (11 Oct 2019 11:25)  POCT Blood Glucose.: 172 mg/dL (11 Oct 2019 07:38)  POCT Blood Glucose.: 183 mg/dL (10 Oct 2019 21:50)  POCT Blood Glucose.: 205 mg/dL (10 Oct 2019 16:38)    Physical Exam  General: agitated, stating he wants to go to [facility] remainder of exam    LABS:                         15.0   6.23  )-----------( 156      ( 11 Oct 2019 06:10 )             49.8     10-11    139  |  105  |  10  ----------------------------<  214<H>  3.8   |  21<L>  |  0.57    Ca    8.6      11 Oct 2019 06:10  Phos  3.2     10-11  Mg     1.8     10-11    TPro  5.9<L>  /  Alb  3.3  /  TBili  0.6  /  DBili  x   /  AST  10  /  ALT  7   /  AlkPhos  90  10-11      RADIOLOGY, EKG & ADDITIONAL TESTS: Reviewed.     Consultant notes reviewed: Oncology and Psychiatry

## 2019-10-11 NOTE — PROGRESS NOTE BEHAVIORAL HEALTH - OTHER
angry and hostile initially-calmer during rest of interview no rigidity noted somewhat childlike concrete, goal directed, focused on going to [facility] focused on going to [facility] pt does not appear internally preoccupied, states the voices are his own thoughts, denies CAH Appears limited, seems ?intellectually limited Poor historian regarding medical care and recent admission Poor historian regarding medical care and hx of treatments angry, aggressive,  and hostile initially-calmer during rest of interview variable, staff report patient threw his cane and becomes belligerent and loud paranoid, pt. stated that he does not trust staff and scared of going home, as they gossip about me pt does not appear internally preoccupied, states that he is hearing voices, stated that he cannot tell if these are his own thoughts, denies CAH

## 2019-10-12 LAB
CHOLEST SERPL-MCNC: 100 MG/DL — LOW (ref 120–199)
GLUCOSE BLDC GLUCOMTR-MCNC: 172 MG/DL — HIGH (ref 70–99)
GLUCOSE BLDC GLUCOMTR-MCNC: 252 MG/DL — HIGH (ref 70–99)
GLUCOSE BLDC GLUCOMTR-MCNC: 96 MG/DL — SIGNIFICANT CHANGE UP (ref 70–99)
HDLC SERPL-MCNC: 20 MG/DL — LOW (ref 35–55)
LIPID PNL WITH DIRECT LDL SERPL: 40 MG/DL — SIGNIFICANT CHANGE UP
TRIGL SERPL-MCNC: 360 MG/DL — HIGH (ref 10–149)

## 2019-10-12 PROCEDURE — 99232 SBSQ HOSP IP/OBS MODERATE 35: CPT

## 2019-10-12 PROCEDURE — 99223 1ST HOSP IP/OBS HIGH 75: CPT

## 2019-10-12 RX ORDER — INSULIN LISPRO 100/ML
VIAL (ML) SUBCUTANEOUS AT BEDTIME
Refills: 0 | Status: DISCONTINUED | OUTPATIENT
Start: 2019-10-12 | End: 2019-11-15

## 2019-10-12 RX ORDER — INSULIN LISPRO 100/ML
VIAL (ML) SUBCUTANEOUS
Refills: 0 | Status: DISCONTINUED | OUTPATIENT
Start: 2019-10-12 | End: 2019-11-15

## 2019-10-12 RX ADMIN — GABAPENTIN 600 MILLIGRAM(S): 400 CAPSULE ORAL at 13:39

## 2019-10-12 RX ADMIN — Medication 100 MILLIGRAM(S): at 09:08

## 2019-10-12 RX ADMIN — QUETIAPINE FUMARATE 25 MILLIGRAM(S): 200 TABLET, FILM COATED ORAL at 16:15

## 2019-10-12 RX ADMIN — Medication 1 DROP(S): at 20:08

## 2019-10-12 RX ADMIN — CLOPIDOGREL BISULFATE 75 MILLIGRAM(S): 75 TABLET, FILM COATED ORAL at 09:08

## 2019-10-12 RX ADMIN — METFORMIN HYDROCHLORIDE 1000 MILLIGRAM(S): 850 TABLET ORAL at 09:08

## 2019-10-12 RX ADMIN — Medication 81 MILLIGRAM(S): at 09:08

## 2019-10-12 RX ADMIN — ATORVASTATIN CALCIUM 80 MILLIGRAM(S): 80 TABLET, FILM COATED ORAL at 20:08

## 2019-10-12 RX ADMIN — GABAPENTIN 600 MILLIGRAM(S): 400 CAPSULE ORAL at 09:08

## 2019-10-12 RX ADMIN — QUETIAPINE FUMARATE 25 MILLIGRAM(S): 200 TABLET, FILM COATED ORAL at 09:55

## 2019-10-12 RX ADMIN — Medication 1 DROP(S): at 13:39

## 2019-10-12 RX ADMIN — GABAPENTIN 600 MILLIGRAM(S): 400 CAPSULE ORAL at 20:06

## 2019-10-12 RX ADMIN — Medication 4 MILLIGRAM(S): at 09:08

## 2019-10-12 RX ADMIN — AMLODIPINE BESYLATE 5 MILLIGRAM(S): 2.5 TABLET ORAL at 09:08

## 2019-10-12 RX ADMIN — METFORMIN HYDROCHLORIDE 1000 MILLIGRAM(S): 850 TABLET ORAL at 20:08

## 2019-10-12 RX ADMIN — QUETIAPINE FUMARATE 100 MILLIGRAM(S): 200 TABLET, FILM COATED ORAL at 20:08

## 2019-10-12 RX ADMIN — Medication 1 DROP(S): at 17:15

## 2019-10-12 NOTE — CONSULT NOTE ADULT - SUBJECTIVE AND OBJECTIVE BOX
HPI - hospital course - 73 y/o M h/o schizoaffective, CLL on chemo, DM2, HTN, HDL, CABG x 2 with c/o increased agitation, depression and unable to take care of himself      Adjustment disorder with anxious mood   - S/p ativan and seroquel in the ED. Evaluated by psych, placed on seroquel q6h prn and 100mg qhs  - remains intermittently agitated  - per psych, seroquel 50mg at 8am, 1pm and 100mg qhs.     CLL (chronic lymphocytic leukemia).  Plan: - According to outpatient record in chart, patient completed last dose of chemo in clinic 10/8/19  - seen by oncology here, appreciate input, holding ibrutinib during active psychiatric issues, pt to follow up outpatient with oncology after psychiatric issues are stable for evaluation for resuming ibrutinib.     Type 2 diabetes mellitus without complication, unspecified whether long term insulin use.  Plan: - Hold home po regimen.  - SSI to cover FSG qAC and qHS.      Hypertension.  Plan: - BP stable currently.  - continue home dose norvasc and BB.     CAD (coronary artery disease).  Plan: - C/w ASA, plavix and BB. No signs of acute ACS.      Need for prophylactic measure. Plan: - DVT ppx w/ lovenox  - Diabetic diet.      Dispo: [facility] - pt today states he was told he has glaucoma but shouldn't be on meds. denies vision issues, just some tearing and requesting artificial tears     Allergy: NKDA    PMHX: as above  Social: denies  FHX: NC as relates to this encounter      Patient is a 74y old  Male who presents with a chief complaint of     SUBJECTIVE / OVERNIGHT EVENTS:    ROS:  No HA/DZ  No Vision changes   No CP, SOB  No N/V/D  No Edema  No Rash  NO weakness, numbness    MEDICATIONS  (STANDING):  amLODIPine   Tablet 5 milliGRAM(s) Oral daily  aspirin enteric coated 81 milliGRAM(s) Oral daily  atorvastatin 80 milliGRAM(s) Oral at bedtime  clopidogrel Tablet 75 milliGRAM(s) Oral daily  dextrose 5%. 1000 milliLiter(s) (50 mL/Hr) IV Continuous <Continuous>  dextrose 50% Injectable 12.5 Gram(s) IV Push once  dextrose 50% Injectable 25 Gram(s) IV Push once  dextrose 50% Injectable 25 Gram(s) IV Push once  gabapentin 600 milliGRAM(s) Oral three times a day  glimepiride. 4 milliGRAM(s) Oral with breakfast  metFORMIN 1000 milliGRAM(s) Oral two times a day  metoprolol succinate  milliGRAM(s) Oral daily  QUEtiapine 100 milliGRAM(s) Oral at bedtime  sitaGLIPtin 100 milliGRAM(s) Oral daily    MEDICATIONS  (PRN):  ALBUTerol    90 MICROgram(s) HFA Inhaler 2 Puff(s) Inhalation every 6 hours PRN wheezing  dextrose 40% Gel 15 Gram(s) Oral once PRN Blood Glucose LESS THAN 70 milliGRAM(s)/deciliter  glucagon  Injectable 1 milliGRAM(s) IntraMuscular once PRN Glucose LESS THAN 70 milligrams/deciliter  haloperidol     Tablet 2 milliGRAM(s) Oral every 6 hours PRN agitation  haloperidol    Injectable 2 milliGRAM(s) IntraMuscular once PRN severe agitation  QUEtiapine 25 milliGRAM(s) Oral every 6 hours PRN anxiety      T(C): 36.6 (10-12-19 @ 06:08)  HR: 86 (10-11-19 @ 14:23)  BP: 144/71 (10-11-19 @ 14:23)  RR: 18 (10-11-19 @ 19:17)  SpO2: 98% (10-11-19 @ 14:23)  CAPILLARY BLOOD GLUCOSE      POCT Blood Glucose.: 252 mg/dL (12 Oct 2019 08:22)  POCT Blood Glucose.: 270 mg/dL (11 Oct 2019 16:33)    I&O's Summary      PHYSICAL EXAM:  GENERAL: NAD, well-developed  HEAD:  Atraumatic, Normocephalic  EYES: EOMI, PERRL, conjunctiva and sclera clear  NECK: Supple, No JVD  CHEST/LUNG: Clear to auscultation bilaterally  HEART: Regular rate and rhythm; No murmurs, rubs, or gallops, No Edema  ABDOMEN: Soft, Nontender, Nondistended; Bowel sounds present  EXTREMITIES:  2+ Peripheral Pulses, No clubbing, cyanosis  PSYCH: No SI/HI  NEUROLOGY: non-focal  SKIN: No rashes or lesions    LABS:                        15.0   6.23  )-----------( 156      ( 11 Oct 2019 06:10 )             49.8     10-11    139  |  105  |  10  ----------------------------<  214<H>  3.8   |  21<L>  |  0.57    Ca    8.6      11 Oct 2019 06:10  Phos  3.2     10-11  Mg     1.8     10-11    TPro  5.9<L>  /  Alb  3.3  /  TBili  0.6  /  DBili  x   /  AST  10  /  ALT  7   /  AlkPhos  90  10-11                  RADIOLOGY & ADDITIONAL TESTS:    Imaging Personally Reviewed:    Consultant(s) Notes Reviewed:      Care Discussed with Consultants/Other Providers:

## 2019-10-12 NOTE — CONSULT NOTE ADULT - ASSESSMENT
75 y/o M h/o schizoaffective, CLL on chemo, DM2, HTN, HDL, CABG x 2 with c/o increased agitation, depression admitted to [facility]    Adjustment disorder with anxious mood   - per psych, monitor QTC opn psychotropics       CLL (chronic lymphocytic leukemia).  Plan: - According to outpatient record in chart, patient completed last dose of chemo in clinic 10/8/19  - seen by oncology here, appreciate input, holding ibrutinib during active psychiatric issues, pt to follow up outpatient with oncology after psychiatric issues are stable for evaluation for resuming ibrutinib.     Type 2 diabetes mellitus without complication, unspecified whether long term insulin use.  Plan: -resumed on home regimen, A1C is not controlled (10.9) and sulfonylurea not idea for his age - FS mid 200s, will monitor today and might need to transition to insulin.      Hypertension.  Plan: - BP stable currently.  - continue home dose norvasc and BB.     CAD (coronary artery disease).  Plan: - C/w ASA, plavix and BB. No signs of acute ACS.     High TG - 390 - diet controlled, LDL acceptable 75 y/o M h/o schizoaffective, CLL on chemo, DM2, HTN, HDL, CABG x 2 with c/o increased agitation, depression admitted to [facility]    Adjustment disorder with anxious mood   - per psych, monitor QTC opn psychotropics       CLL (chronic lymphocytic leukemia).  Plan: - According to outpatient record in chart, patient completed last dose of chemo in clinic 10/8/19  - seen by oncology here, appreciate input, holding ibrutinib during active psychiatric issues, pt to follow up outpatient with oncology after psychiatric issues are stable for evaluation for resuming ibrutinib.     Type 2 diabetes mellitus without complication, unspecified whether long term insulin use.  Plan: -resumed on home regimen, A1C is not controlled (10.9) and sulfonylurea not idea for his age - FS mid 200s, will monitor today and might need to transition to insulin.      Hypertension.  Plan: - BP stable currently.  - continue home dose norvasc and BB.     CAD (coronary artery disease).  Plan: - C/w ASA, plavix and BB. No signs of acute ACS.     High TG - 390 - diet controlled, LDL acceptable     Reports dx of glaucoma but was told not to be on gtt - dw primary team to set up opthal clinic appointment on Monday - pt requesting artifical teal gtt for now - no vision disturbances, no HA, DZ

## 2019-10-13 LAB
GLUCOSE BLDC GLUCOMTR-MCNC: 115 MG/DL — HIGH (ref 70–99)
GLUCOSE BLDC GLUCOMTR-MCNC: 137 MG/DL — HIGH (ref 70–99)
GLUCOSE BLDC GLUCOMTR-MCNC: 165 MG/DL — HIGH (ref 70–99)

## 2019-10-13 PROCEDURE — 99232 SBSQ HOSP IP/OBS MODERATE 35: CPT

## 2019-10-13 RX ADMIN — Medication 1 DROP(S): at 20:23

## 2019-10-13 RX ADMIN — Medication 100 MILLIGRAM(S): at 09:09

## 2019-10-13 RX ADMIN — Medication 4 MILLIGRAM(S): at 09:09

## 2019-10-13 RX ADMIN — Medication 1 DROP(S): at 13:23

## 2019-10-13 RX ADMIN — Medication 1 DROP(S): at 17:00

## 2019-10-13 RX ADMIN — CLOPIDOGREL BISULFATE 75 MILLIGRAM(S): 75 TABLET, FILM COATED ORAL at 09:09

## 2019-10-13 RX ADMIN — Medication 81 MILLIGRAM(S): at 09:09

## 2019-10-13 RX ADMIN — Medication 1 DROP(S): at 09:09

## 2019-10-13 RX ADMIN — GABAPENTIN 600 MILLIGRAM(S): 400 CAPSULE ORAL at 13:23

## 2019-10-13 RX ADMIN — METFORMIN HYDROCHLORIDE 1000 MILLIGRAM(S): 850 TABLET ORAL at 09:09

## 2019-10-13 RX ADMIN — AMLODIPINE BESYLATE 5 MILLIGRAM(S): 2.5 TABLET ORAL at 09:09

## 2019-10-13 RX ADMIN — GABAPENTIN 600 MILLIGRAM(S): 400 CAPSULE ORAL at 09:09

## 2019-10-13 RX ADMIN — QUETIAPINE FUMARATE 100 MILLIGRAM(S): 200 TABLET, FILM COATED ORAL at 20:23

## 2019-10-13 RX ADMIN — ATORVASTATIN CALCIUM 80 MILLIGRAM(S): 80 TABLET, FILM COATED ORAL at 20:23

## 2019-10-13 RX ADMIN — METFORMIN HYDROCHLORIDE 1000 MILLIGRAM(S): 850 TABLET ORAL at 20:23

## 2019-10-13 RX ADMIN — GABAPENTIN 600 MILLIGRAM(S): 400 CAPSULE ORAL at 20:21

## 2019-10-14 LAB
GLUCOSE BLDC GLUCOMTR-MCNC: 120 MG/DL — HIGH (ref 70–99)
GLUCOSE BLDC GLUCOMTR-MCNC: 126 MG/DL — HIGH (ref 70–99)
GLUCOSE BLDC GLUCOMTR-MCNC: 136 MG/DL — HIGH (ref 70–99)

## 2019-10-14 PROCEDURE — 99232 SBSQ HOSP IP/OBS MODERATE 35: CPT

## 2019-10-14 RX ORDER — QUETIAPINE FUMARATE 200 MG/1
25 TABLET, FILM COATED ORAL DAILY
Refills: 0 | Status: DISCONTINUED | OUTPATIENT
Start: 2019-10-14 | End: 2019-10-15

## 2019-10-14 RX ADMIN — GABAPENTIN 600 MILLIGRAM(S): 400 CAPSULE ORAL at 13:07

## 2019-10-14 RX ADMIN — Medication 100 MILLIGRAM(S): at 08:26

## 2019-10-14 RX ADMIN — Medication 1 DROP(S): at 09:59

## 2019-10-14 RX ADMIN — Medication 1 DROP(S): at 18:47

## 2019-10-14 RX ADMIN — QUETIAPINE FUMARATE 25 MILLIGRAM(S): 200 TABLET, FILM COATED ORAL at 12:32

## 2019-10-14 RX ADMIN — GABAPENTIN 600 MILLIGRAM(S): 400 CAPSULE ORAL at 20:43

## 2019-10-14 RX ADMIN — Medication 81 MILLIGRAM(S): at 08:26

## 2019-10-14 RX ADMIN — Medication 1 DROP(S): at 20:45

## 2019-10-14 RX ADMIN — Medication 0: at 21:16

## 2019-10-14 RX ADMIN — GABAPENTIN 600 MILLIGRAM(S): 400 CAPSULE ORAL at 08:26

## 2019-10-14 RX ADMIN — Medication 1 DROP(S): at 14:40

## 2019-10-14 RX ADMIN — METFORMIN HYDROCHLORIDE 1000 MILLIGRAM(S): 850 TABLET ORAL at 20:43

## 2019-10-14 RX ADMIN — METFORMIN HYDROCHLORIDE 1000 MILLIGRAM(S): 850 TABLET ORAL at 08:26

## 2019-10-14 RX ADMIN — ATORVASTATIN CALCIUM 80 MILLIGRAM(S): 80 TABLET, FILM COATED ORAL at 20:43

## 2019-10-14 RX ADMIN — Medication 4 MILLIGRAM(S): at 08:26

## 2019-10-14 RX ADMIN — HALOPERIDOL DECANOATE 2 MILLIGRAM(S): 100 INJECTION INTRAMUSCULAR at 14:18

## 2019-10-14 RX ADMIN — AMLODIPINE BESYLATE 5 MILLIGRAM(S): 2.5 TABLET ORAL at 08:26

## 2019-10-14 RX ADMIN — QUETIAPINE FUMARATE 25 MILLIGRAM(S): 200 TABLET, FILM COATED ORAL at 09:06

## 2019-10-14 RX ADMIN — CLOPIDOGREL BISULFATE 75 MILLIGRAM(S): 75 TABLET, FILM COATED ORAL at 08:26

## 2019-10-14 RX ADMIN — HALOPERIDOL DECANOATE 2 MILLIGRAM(S): 100 INJECTION INTRAMUSCULAR at 21:43

## 2019-10-14 RX ADMIN — QUETIAPINE FUMARATE 100 MILLIGRAM(S): 200 TABLET, FILM COATED ORAL at 20:43

## 2019-10-15 LAB
GLUCOSE BLDC GLUCOMTR-MCNC: 122 MG/DL — HIGH (ref 70–99)
GLUCOSE BLDC GLUCOMTR-MCNC: 126 MG/DL — HIGH (ref 70–99)
GLUCOSE BLDC GLUCOMTR-MCNC: 133 MG/DL — HIGH (ref 70–99)
GLUCOSE BLDC GLUCOMTR-MCNC: 149 MG/DL — HIGH (ref 70–99)

## 2019-10-15 PROCEDURE — 99232 SBSQ HOSP IP/OBS MODERATE 35: CPT

## 2019-10-15 RX ORDER — INFLUENZA VIRUS VACCINE 15; 15; 15; 15 UG/.5ML; UG/.5ML; UG/.5ML; UG/.5ML
0.5 SUSPENSION INTRAMUSCULAR ONCE
Refills: 0 | Status: COMPLETED | OUTPATIENT
Start: 2019-10-15 | End: 2019-10-15

## 2019-10-15 RX ORDER — QUETIAPINE FUMARATE 200 MG/1
50 TABLET, FILM COATED ORAL DAILY
Refills: 0 | Status: DISCONTINUED | OUTPATIENT
Start: 2019-10-15 | End: 2019-10-16

## 2019-10-15 RX ORDER — GABAPENTIN 400 MG/1
800 CAPSULE ORAL THREE TIMES A DAY
Refills: 0 | Status: DISCONTINUED | OUTPATIENT
Start: 2019-10-15 | End: 2019-10-22

## 2019-10-15 RX ORDER — ACETAMINOPHEN 500 MG
650 TABLET ORAL EVERY 6 HOURS
Refills: 0 | Status: DISCONTINUED | OUTPATIENT
Start: 2019-10-15 | End: 2019-11-15

## 2019-10-15 RX ADMIN — Medication 650 MILLIGRAM(S): at 18:38

## 2019-10-15 RX ADMIN — Medication 100 MILLIGRAM(S): at 08:47

## 2019-10-15 RX ADMIN — METFORMIN HYDROCHLORIDE 1000 MILLIGRAM(S): 850 TABLET ORAL at 08:47

## 2019-10-15 RX ADMIN — GABAPENTIN 600 MILLIGRAM(S): 400 CAPSULE ORAL at 13:02

## 2019-10-15 RX ADMIN — METFORMIN HYDROCHLORIDE 1000 MILLIGRAM(S): 850 TABLET ORAL at 20:57

## 2019-10-15 RX ADMIN — QUETIAPINE FUMARATE 50 MILLIGRAM(S): 200 TABLET, FILM COATED ORAL at 08:48

## 2019-10-15 RX ADMIN — Medication 650 MILLIGRAM(S): at 15:41

## 2019-10-15 RX ADMIN — Medication 81 MILLIGRAM(S): at 08:48

## 2019-10-15 RX ADMIN — Medication 4 MILLIGRAM(S): at 08:48

## 2019-10-15 RX ADMIN — INFLUENZA VIRUS VACCINE 0.5 MILLILITER(S): 15; 15; 15; 15 SUSPENSION INTRAMUSCULAR at 13:05

## 2019-10-15 RX ADMIN — Medication 1 DROP(S): at 08:48

## 2019-10-15 RX ADMIN — GABAPENTIN 800 MILLIGRAM(S): 400 CAPSULE ORAL at 20:56

## 2019-10-15 RX ADMIN — Medication 1 DROP(S): at 18:35

## 2019-10-15 RX ADMIN — Medication 0: at 21:33

## 2019-10-15 RX ADMIN — Medication 1 DROP(S): at 13:02

## 2019-10-15 RX ADMIN — HALOPERIDOL DECANOATE 2 MILLIGRAM(S): 100 INJECTION INTRAMUSCULAR at 13:21

## 2019-10-15 RX ADMIN — ATORVASTATIN CALCIUM 80 MILLIGRAM(S): 80 TABLET, FILM COATED ORAL at 20:56

## 2019-10-15 RX ADMIN — AMLODIPINE BESYLATE 5 MILLIGRAM(S): 2.5 TABLET ORAL at 08:48

## 2019-10-15 RX ADMIN — CLOPIDOGREL BISULFATE 75 MILLIGRAM(S): 75 TABLET, FILM COATED ORAL at 08:48

## 2019-10-15 RX ADMIN — Medication 1 DROP(S): at 20:56

## 2019-10-15 RX ADMIN — GABAPENTIN 600 MILLIGRAM(S): 400 CAPSULE ORAL at 08:48

## 2019-10-15 RX ADMIN — QUETIAPINE FUMARATE 100 MILLIGRAM(S): 200 TABLET, FILM COATED ORAL at 20:57

## 2019-10-16 LAB
GLUCOSE BLDC GLUCOMTR-MCNC: 103 MG/DL — HIGH (ref 70–99)
GLUCOSE BLDC GLUCOMTR-MCNC: 155 MG/DL — HIGH (ref 70–99)
GLUCOSE BLDC GLUCOMTR-MCNC: 81 MG/DL — SIGNIFICANT CHANGE UP (ref 70–99)

## 2019-10-16 PROCEDURE — 90853 GROUP PSYCHOTHERAPY: CPT

## 2019-10-16 PROCEDURE — 99232 SBSQ HOSP IP/OBS MODERATE 35: CPT

## 2019-10-16 PROCEDURE — 90832 PSYTX W PT 30 MINUTES: CPT | Mod: 59

## 2019-10-16 RX ORDER — HALOPERIDOL DECANOATE 100 MG/ML
1 INJECTION INTRAMUSCULAR THREE TIMES A DAY
Refills: 0 | Status: DISCONTINUED | OUTPATIENT
Start: 2019-10-16 | End: 2019-10-17

## 2019-10-16 RX ADMIN — Medication 1: at 08:33

## 2019-10-16 RX ADMIN — HALOPERIDOL DECANOATE 1 MILLIGRAM(S): 100 INJECTION INTRAMUSCULAR at 13:00

## 2019-10-16 RX ADMIN — HALOPERIDOL DECANOATE 2 MILLIGRAM(S): 100 INJECTION INTRAMUSCULAR at 22:51

## 2019-10-16 RX ADMIN — METFORMIN HYDROCHLORIDE 1000 MILLIGRAM(S): 850 TABLET ORAL at 20:33

## 2019-10-16 RX ADMIN — GABAPENTIN 800 MILLIGRAM(S): 400 CAPSULE ORAL at 13:00

## 2019-10-16 RX ADMIN — Medication 1 DROP(S): at 18:12

## 2019-10-16 RX ADMIN — HALOPERIDOL DECANOATE 1 MILLIGRAM(S): 100 INJECTION INTRAMUSCULAR at 20:34

## 2019-10-16 RX ADMIN — ATORVASTATIN CALCIUM 80 MILLIGRAM(S): 80 TABLET, FILM COATED ORAL at 20:33

## 2019-10-16 RX ADMIN — GABAPENTIN 800 MILLIGRAM(S): 400 CAPSULE ORAL at 19:43

## 2019-10-16 RX ADMIN — Medication 4 MILLIGRAM(S): at 09:22

## 2019-10-16 RX ADMIN — AMLODIPINE BESYLATE 5 MILLIGRAM(S): 2.5 TABLET ORAL at 09:21

## 2019-10-16 RX ADMIN — Medication 1 DROP(S): at 12:57

## 2019-10-16 RX ADMIN — HALOPERIDOL DECANOATE 2 MILLIGRAM(S): 100 INJECTION INTRAMUSCULAR at 16:37

## 2019-10-16 RX ADMIN — GABAPENTIN 800 MILLIGRAM(S): 400 CAPSULE ORAL at 09:22

## 2019-10-16 RX ADMIN — Medication 100 MILLIGRAM(S): at 09:22

## 2019-10-16 RX ADMIN — HALOPERIDOL DECANOATE 1 MILLIGRAM(S): 100 INJECTION INTRAMUSCULAR at 09:22

## 2019-10-16 RX ADMIN — CLOPIDOGREL BISULFATE 75 MILLIGRAM(S): 75 TABLET, FILM COATED ORAL at 09:22

## 2019-10-16 RX ADMIN — Medication 1 DROP(S): at 09:21

## 2019-10-16 RX ADMIN — Medication 1 DROP(S): at 21:03

## 2019-10-16 RX ADMIN — Medication 81 MILLIGRAM(S): at 09:21

## 2019-10-16 RX ADMIN — METFORMIN HYDROCHLORIDE 1000 MILLIGRAM(S): 850 TABLET ORAL at 09:22

## 2019-10-17 ENCOUNTER — APPOINTMENT (OUTPATIENT)
Dept: OPHTHALMOLOGY | Facility: CLINIC | Age: 74
End: 2019-10-17

## 2019-10-17 LAB
GLUCOSE BLDC GLUCOMTR-MCNC: 122 MG/DL — HIGH (ref 70–99)
GLUCOSE BLDC GLUCOMTR-MCNC: 126 MG/DL — HIGH (ref 70–99)
GLUCOSE BLDC GLUCOMTR-MCNC: 154 MG/DL — HIGH (ref 70–99)
GLUCOSE BLDC GLUCOMTR-MCNC: 89 MG/DL — SIGNIFICANT CHANGE UP (ref 70–99)

## 2019-10-17 PROCEDURE — 99232 SBSQ HOSP IP/OBS MODERATE 35: CPT

## 2019-10-17 RX ORDER — HALOPERIDOL DECANOATE 100 MG/ML
2 INJECTION INTRAMUSCULAR THREE TIMES A DAY
Refills: 0 | Status: DISCONTINUED | OUTPATIENT
Start: 2019-10-17 | End: 2019-10-20

## 2019-10-17 RX ADMIN — CLOPIDOGREL BISULFATE 75 MILLIGRAM(S): 75 TABLET, FILM COATED ORAL at 08:24

## 2019-10-17 RX ADMIN — Medication 1 DROP(S): at 20:47

## 2019-10-17 RX ADMIN — AMLODIPINE BESYLATE 5 MILLIGRAM(S): 2.5 TABLET ORAL at 08:24

## 2019-10-17 RX ADMIN — Medication 1 MILLIGRAM(S): at 08:57

## 2019-10-17 RX ADMIN — GABAPENTIN 800 MILLIGRAM(S): 400 CAPSULE ORAL at 12:35

## 2019-10-17 RX ADMIN — HALOPERIDOL DECANOATE 2 MILLIGRAM(S): 100 INJECTION INTRAMUSCULAR at 08:24

## 2019-10-17 RX ADMIN — Medication 1 DROP(S): at 12:35

## 2019-10-17 RX ADMIN — Medication 81 MILLIGRAM(S): at 08:24

## 2019-10-17 RX ADMIN — ATORVASTATIN CALCIUM 80 MILLIGRAM(S): 80 TABLET, FILM COATED ORAL at 20:47

## 2019-10-17 RX ADMIN — METFORMIN HYDROCHLORIDE 1000 MILLIGRAM(S): 850 TABLET ORAL at 08:24

## 2019-10-17 RX ADMIN — HALOPERIDOL DECANOATE 2 MILLIGRAM(S): 100 INJECTION INTRAMUSCULAR at 20:47

## 2019-10-17 RX ADMIN — GABAPENTIN 800 MILLIGRAM(S): 400 CAPSULE ORAL at 08:07

## 2019-10-17 RX ADMIN — Medication 100 MILLIGRAM(S): at 08:24

## 2019-10-17 RX ADMIN — Medication 1 DROP(S): at 08:24

## 2019-10-17 RX ADMIN — GABAPENTIN 800 MILLIGRAM(S): 400 CAPSULE ORAL at 20:47

## 2019-10-17 RX ADMIN — HALOPERIDOL DECANOATE 2 MILLIGRAM(S): 100 INJECTION INTRAMUSCULAR at 12:34

## 2019-10-17 RX ADMIN — Medication 1 DROP(S): at 17:04

## 2019-10-17 RX ADMIN — Medication 4 MILLIGRAM(S): at 08:24

## 2019-10-17 RX ADMIN — METFORMIN HYDROCHLORIDE 1000 MILLIGRAM(S): 850 TABLET ORAL at 20:47

## 2019-10-18 LAB
GLUCOSE BLDC GLUCOMTR-MCNC: 103 MG/DL — HIGH (ref 70–99)
GLUCOSE BLDC GLUCOMTR-MCNC: 114 MG/DL — HIGH (ref 70–99)
GLUCOSE BLDC GLUCOMTR-MCNC: 152 MG/DL — HIGH (ref 70–99)

## 2019-10-18 PROCEDURE — 99232 SBSQ HOSP IP/OBS MODERATE 35: CPT

## 2019-10-18 RX ORDER — LATANOPROST 0.05 MG/ML
1 SOLUTION/ DROPS OPHTHALMIC; TOPICAL AT BEDTIME
Refills: 0 | Status: DISCONTINUED | OUTPATIENT
Start: 2019-10-18 | End: 2019-11-15

## 2019-10-18 RX ADMIN — Medication 4 MILLIGRAM(S): at 09:51

## 2019-10-18 RX ADMIN — Medication 0.5 MILLIGRAM(S): at 07:45

## 2019-10-18 RX ADMIN — Medication 1 DROP(S): at 12:33

## 2019-10-18 RX ADMIN — Medication 1 DROP(S): at 17:05

## 2019-10-18 RX ADMIN — Medication 100 MILLIGRAM(S): at 07:47

## 2019-10-18 RX ADMIN — GABAPENTIN 800 MILLIGRAM(S): 400 CAPSULE ORAL at 07:46

## 2019-10-18 RX ADMIN — HALOPERIDOL DECANOATE 2 MILLIGRAM(S): 100 INJECTION INTRAMUSCULAR at 07:46

## 2019-10-18 RX ADMIN — CLOPIDOGREL BISULFATE 75 MILLIGRAM(S): 75 TABLET, FILM COATED ORAL at 07:46

## 2019-10-18 RX ADMIN — Medication 1 DROP(S): at 20:45

## 2019-10-18 RX ADMIN — LATANOPROST 1 DROP(S): 0.05 SOLUTION/ DROPS OPHTHALMIC; TOPICAL at 20:42

## 2019-10-18 RX ADMIN — HALOPERIDOL DECANOATE 2 MILLIGRAM(S): 100 INJECTION INTRAMUSCULAR at 20:40

## 2019-10-18 RX ADMIN — HALOPERIDOL DECANOATE 2 MILLIGRAM(S): 100 INJECTION INTRAMUSCULAR at 12:33

## 2019-10-18 RX ADMIN — Medication 1 DROP(S): at 07:46

## 2019-10-18 RX ADMIN — HALOPERIDOL DECANOATE 2 MILLIGRAM(S): 100 INJECTION INTRAMUSCULAR at 09:56

## 2019-10-18 RX ADMIN — AMLODIPINE BESYLATE 5 MILLIGRAM(S): 2.5 TABLET ORAL at 07:46

## 2019-10-18 RX ADMIN — ATORVASTATIN CALCIUM 80 MILLIGRAM(S): 80 TABLET, FILM COATED ORAL at 20:40

## 2019-10-18 RX ADMIN — METFORMIN HYDROCHLORIDE 1000 MILLIGRAM(S): 850 TABLET ORAL at 20:40

## 2019-10-18 RX ADMIN — GABAPENTIN 800 MILLIGRAM(S): 400 CAPSULE ORAL at 20:40

## 2019-10-18 RX ADMIN — METFORMIN HYDROCHLORIDE 1000 MILLIGRAM(S): 850 TABLET ORAL at 09:51

## 2019-10-18 RX ADMIN — GABAPENTIN 800 MILLIGRAM(S): 400 CAPSULE ORAL at 12:33

## 2019-10-18 RX ADMIN — Medication 1 DROP(S): at 20:41

## 2019-10-18 RX ADMIN — Medication 81 MILLIGRAM(S): at 07:46

## 2019-10-19 LAB
GLUCOSE BLDC GLUCOMTR-MCNC: 136 MG/DL — HIGH (ref 70–99)
GLUCOSE BLDC GLUCOMTR-MCNC: 93 MG/DL — SIGNIFICANT CHANGE UP (ref 70–99)

## 2019-10-19 PROCEDURE — 99231 SBSQ HOSP IP/OBS SF/LOW 25: CPT

## 2019-10-19 RX ORDER — HYDROCORTISONE 1 %
1 OINTMENT (GRAM) TOPICAL
Refills: 0 | Status: DISCONTINUED | OUTPATIENT
Start: 2019-10-19 | End: 2019-10-19

## 2019-10-19 RX ORDER — HYDROCORTISONE 1 %
1 OINTMENT (GRAM) TOPICAL
Refills: 0 | Status: DISCONTINUED | OUTPATIENT
Start: 2019-10-19 | End: 2019-11-03

## 2019-10-19 RX ORDER — MECLIZINE HCL 12.5 MG
25 TABLET ORAL THREE TIMES A DAY
Refills: 0 | Status: DISCONTINUED | OUTPATIENT
Start: 2019-10-19 | End: 2019-11-15

## 2019-10-19 RX ADMIN — Medication 25 MILLIGRAM(S): at 13:45

## 2019-10-19 RX ADMIN — HALOPERIDOL DECANOATE 2 MILLIGRAM(S): 100 INJECTION INTRAMUSCULAR at 08:23

## 2019-10-19 RX ADMIN — Medication 100 MILLIGRAM(S): at 08:23

## 2019-10-19 RX ADMIN — GABAPENTIN 800 MILLIGRAM(S): 400 CAPSULE ORAL at 20:43

## 2019-10-19 RX ADMIN — Medication 4 MILLIGRAM(S): at 08:23

## 2019-10-19 RX ADMIN — Medication 81 MILLIGRAM(S): at 08:23

## 2019-10-19 RX ADMIN — Medication 0.5 MILLIGRAM(S): at 14:15

## 2019-10-19 RX ADMIN — METFORMIN HYDROCHLORIDE 1000 MILLIGRAM(S): 850 TABLET ORAL at 08:23

## 2019-10-19 RX ADMIN — HALOPERIDOL DECANOATE 2 MILLIGRAM(S): 100 INJECTION INTRAMUSCULAR at 21:01

## 2019-10-19 RX ADMIN — Medication 1 DROP(S): at 10:08

## 2019-10-19 RX ADMIN — Medication 25 MILLIGRAM(S): at 21:01

## 2019-10-19 RX ADMIN — CLOPIDOGREL BISULFATE 75 MILLIGRAM(S): 75 TABLET, FILM COATED ORAL at 08:23

## 2019-10-19 RX ADMIN — LATANOPROST 1 DROP(S): 0.05 SOLUTION/ DROPS OPHTHALMIC; TOPICAL at 21:08

## 2019-10-19 RX ADMIN — METFORMIN HYDROCHLORIDE 1000 MILLIGRAM(S): 850 TABLET ORAL at 21:01

## 2019-10-19 RX ADMIN — ATORVASTATIN CALCIUM 80 MILLIGRAM(S): 80 TABLET, FILM COATED ORAL at 21:01

## 2019-10-19 RX ADMIN — GABAPENTIN 800 MILLIGRAM(S): 400 CAPSULE ORAL at 08:23

## 2019-10-19 RX ADMIN — Medication 1 DROP(S): at 21:01

## 2019-10-19 RX ADMIN — GABAPENTIN 800 MILLIGRAM(S): 400 CAPSULE ORAL at 12:17

## 2019-10-19 RX ADMIN — HALOPERIDOL DECANOATE 2 MILLIGRAM(S): 100 INJECTION INTRAMUSCULAR at 12:17

## 2019-10-19 RX ADMIN — Medication 1 DROP(S): at 12:17

## 2019-10-19 RX ADMIN — AMLODIPINE BESYLATE 5 MILLIGRAM(S): 2.5 TABLET ORAL at 08:23

## 2019-10-20 PROCEDURE — 99231 SBSQ HOSP IP/OBS SF/LOW 25: CPT

## 2019-10-20 RX ORDER — HALOPERIDOL DECANOATE 100 MG/ML
2 INJECTION INTRAMUSCULAR
Refills: 0 | Status: DISCONTINUED | OUTPATIENT
Start: 2019-10-20 | End: 2019-11-08

## 2019-10-20 RX ORDER — HALOPERIDOL DECANOATE 100 MG/ML
2 INJECTION INTRAMUSCULAR ONCE
Refills: 0 | Status: COMPLETED | OUTPATIENT
Start: 2019-10-20 | End: 2019-10-20

## 2019-10-20 RX ADMIN — Medication 1 APPLICATION(S): at 09:33

## 2019-10-20 RX ADMIN — Medication 25 MILLIGRAM(S): at 13:28

## 2019-10-20 RX ADMIN — LATANOPROST 1 DROP(S): 0.05 SOLUTION/ DROPS OPHTHALMIC; TOPICAL at 21:29

## 2019-10-20 RX ADMIN — GABAPENTIN 800 MILLIGRAM(S): 400 CAPSULE ORAL at 20:33

## 2019-10-20 RX ADMIN — Medication 25 MILLIGRAM(S): at 08:42

## 2019-10-20 RX ADMIN — GABAPENTIN 800 MILLIGRAM(S): 400 CAPSULE ORAL at 13:28

## 2019-10-20 RX ADMIN — Medication 0.5 MILLIGRAM(S): at 13:28

## 2019-10-20 RX ADMIN — Medication 1 DROP(S): at 20:57

## 2019-10-20 RX ADMIN — Medication 0.5 MILLIGRAM(S): at 20:33

## 2019-10-20 RX ADMIN — AMLODIPINE BESYLATE 5 MILLIGRAM(S): 2.5 TABLET ORAL at 08:42

## 2019-10-20 RX ADMIN — Medication 1 DROP(S): at 13:57

## 2019-10-20 RX ADMIN — HALOPERIDOL DECANOATE 2 MILLIGRAM(S): 100 INJECTION INTRAMUSCULAR at 14:13

## 2019-10-20 RX ADMIN — GABAPENTIN 800 MILLIGRAM(S): 400 CAPSULE ORAL at 07:45

## 2019-10-20 RX ADMIN — Medication 0.5 MILLIGRAM(S): at 01:46

## 2019-10-20 RX ADMIN — Medication 1 MILLIGRAM(S): at 14:50

## 2019-10-20 RX ADMIN — Medication 0.5 MILLIGRAM(S): at 14:13

## 2019-10-20 RX ADMIN — Medication 81 MILLIGRAM(S): at 08:42

## 2019-10-20 RX ADMIN — HALOPERIDOL DECANOATE 2 MILLIGRAM(S): 100 INJECTION INTRAMUSCULAR at 20:58

## 2019-10-20 RX ADMIN — HALOPERIDOL DECANOATE 2 MILLIGRAM(S): 100 INJECTION INTRAMUSCULAR at 14:50

## 2019-10-20 RX ADMIN — Medication 4 MILLIGRAM(S): at 08:42

## 2019-10-20 RX ADMIN — CLOPIDOGREL BISULFATE 75 MILLIGRAM(S): 75 TABLET, FILM COATED ORAL at 08:42

## 2019-10-20 RX ADMIN — Medication 25 MILLIGRAM(S): at 20:59

## 2019-10-20 RX ADMIN — HALOPERIDOL DECANOATE 2 MILLIGRAM(S): 100 INJECTION INTRAMUSCULAR at 13:28

## 2019-10-20 RX ADMIN — Medication 100 MILLIGRAM(S): at 08:42

## 2019-10-20 RX ADMIN — ATORVASTATIN CALCIUM 80 MILLIGRAM(S): 80 TABLET, FILM COATED ORAL at 20:58

## 2019-10-20 RX ADMIN — METFORMIN HYDROCHLORIDE 1000 MILLIGRAM(S): 850 TABLET ORAL at 20:59

## 2019-10-20 RX ADMIN — Medication 1 DROP(S): at 09:32

## 2019-10-20 RX ADMIN — Medication 0.5 MILLIGRAM(S): at 09:00

## 2019-10-20 RX ADMIN — Medication 0.5 MILLIGRAM(S): at 07:46

## 2019-10-20 RX ADMIN — METFORMIN HYDROCHLORIDE 1000 MILLIGRAM(S): 850 TABLET ORAL at 08:42

## 2019-10-21 LAB — GLUCOSE BLDC GLUCOMTR-MCNC: 107 MG/DL — HIGH (ref 70–99)

## 2019-10-21 PROCEDURE — 99232 SBSQ HOSP IP/OBS MODERATE 35: CPT

## 2019-10-21 RX ADMIN — Medication 0.5 MILLIGRAM(S): at 20:36

## 2019-10-21 RX ADMIN — Medication 25 MILLIGRAM(S): at 08:24

## 2019-10-21 RX ADMIN — HALOPERIDOL DECANOATE 2 MILLIGRAM(S): 100 INJECTION INTRAMUSCULAR at 20:37

## 2019-10-21 RX ADMIN — GABAPENTIN 800 MILLIGRAM(S): 400 CAPSULE ORAL at 08:23

## 2019-10-21 RX ADMIN — GABAPENTIN 800 MILLIGRAM(S): 400 CAPSULE ORAL at 12:36

## 2019-10-21 RX ADMIN — Medication 1 DROP(S): at 20:38

## 2019-10-21 RX ADMIN — Medication 1 DROP(S): at 12:36

## 2019-10-21 RX ADMIN — GABAPENTIN 800 MILLIGRAM(S): 400 CAPSULE ORAL at 20:36

## 2019-10-21 RX ADMIN — Medication 650 MILLIGRAM(S): at 16:05

## 2019-10-21 RX ADMIN — HALOPERIDOL DECANOATE 2 MILLIGRAM(S): 100 INJECTION INTRAMUSCULAR at 12:36

## 2019-10-21 RX ADMIN — Medication 0.5 MILLIGRAM(S): at 12:36

## 2019-10-21 RX ADMIN — Medication 100 MILLIGRAM(S): at 08:24

## 2019-10-21 RX ADMIN — LATANOPROST 1 DROP(S): 0.05 SOLUTION/ DROPS OPHTHALMIC; TOPICAL at 20:54

## 2019-10-21 RX ADMIN — Medication 0.5 MILLIGRAM(S): at 08:24

## 2019-10-21 RX ADMIN — METFORMIN HYDROCHLORIDE 1000 MILLIGRAM(S): 850 TABLET ORAL at 20:37

## 2019-10-21 RX ADMIN — AMLODIPINE BESYLATE 5 MILLIGRAM(S): 2.5 TABLET ORAL at 08:23

## 2019-10-21 RX ADMIN — Medication 25 MILLIGRAM(S): at 13:17

## 2019-10-21 RX ADMIN — Medication 1 DROP(S): at 08:23

## 2019-10-21 RX ADMIN — ATORVASTATIN CALCIUM 80 MILLIGRAM(S): 80 TABLET, FILM COATED ORAL at 20:37

## 2019-10-21 RX ADMIN — Medication 25 MILLIGRAM(S): at 20:37

## 2019-10-21 RX ADMIN — Medication 1 APPLICATION(S): at 14:00

## 2019-10-21 RX ADMIN — Medication 81 MILLIGRAM(S): at 08:23

## 2019-10-21 RX ADMIN — CLOPIDOGREL BISULFATE 75 MILLIGRAM(S): 75 TABLET, FILM COATED ORAL at 08:23

## 2019-10-21 RX ADMIN — METFORMIN HYDROCHLORIDE 1000 MILLIGRAM(S): 850 TABLET ORAL at 08:24

## 2019-10-21 RX ADMIN — HALOPERIDOL DECANOATE 2 MILLIGRAM(S): 100 INJECTION INTRAMUSCULAR at 06:47

## 2019-10-21 RX ADMIN — Medication 4 MILLIGRAM(S): at 08:23

## 2019-10-21 RX ADMIN — Medication 650 MILLIGRAM(S): at 17:38

## 2019-10-22 LAB — GLUCOSE BLDC GLUCOMTR-MCNC: 130 MG/DL — HIGH (ref 70–99)

## 2019-10-22 PROCEDURE — 99232 SBSQ HOSP IP/OBS MODERATE 35: CPT

## 2019-10-22 RX ORDER — DIPHENHYDRAMINE HCL 50 MG
25 CAPSULE ORAL EVERY 6 HOURS
Refills: 0 | Status: DISCONTINUED | OUTPATIENT
Start: 2019-10-22 | End: 2019-11-15

## 2019-10-22 RX ORDER — GABAPENTIN 400 MG/1
1000 CAPSULE ORAL
Refills: 0 | Status: DISCONTINUED | OUTPATIENT
Start: 2019-10-22 | End: 2019-11-15

## 2019-10-22 RX ORDER — HALOPERIDOL DECANOATE 100 MG/ML
2 INJECTION INTRAMUSCULAR ONCE
Refills: 0 | Status: COMPLETED | OUTPATIENT
Start: 2019-10-22 | End: 2019-10-22

## 2019-10-22 RX ORDER — GABAPENTIN 400 MG/1
1000 CAPSULE ORAL THREE TIMES A DAY
Refills: 0 | Status: DISCONTINUED | OUTPATIENT
Start: 2019-10-22 | End: 2019-10-22

## 2019-10-22 RX ADMIN — Medication 1 DROP(S): at 12:51

## 2019-10-22 RX ADMIN — Medication 1 DROP(S): at 21:02

## 2019-10-22 RX ADMIN — GABAPENTIN 1000 MILLIGRAM(S): 400 CAPSULE ORAL at 21:02

## 2019-10-22 RX ADMIN — Medication 0.5 MILLIGRAM(S): at 09:15

## 2019-10-22 RX ADMIN — Medication 81 MILLIGRAM(S): at 09:07

## 2019-10-22 RX ADMIN — LATANOPROST 1 DROP(S): 0.05 SOLUTION/ DROPS OPHTHALMIC; TOPICAL at 21:02

## 2019-10-22 RX ADMIN — METFORMIN HYDROCHLORIDE 1000 MILLIGRAM(S): 850 TABLET ORAL at 21:03

## 2019-10-22 RX ADMIN — Medication 100 MILLIGRAM(S): at 09:06

## 2019-10-22 RX ADMIN — GABAPENTIN 1000 MILLIGRAM(S): 400 CAPSULE ORAL at 12:56

## 2019-10-22 RX ADMIN — Medication 25 MILLIGRAM(S): at 12:51

## 2019-10-22 RX ADMIN — METFORMIN HYDROCHLORIDE 1000 MILLIGRAM(S): 850 TABLET ORAL at 09:06

## 2019-10-22 RX ADMIN — CLOPIDOGREL BISULFATE 75 MILLIGRAM(S): 75 TABLET, FILM COATED ORAL at 09:06

## 2019-10-22 RX ADMIN — Medication 650 MILLIGRAM(S): at 10:30

## 2019-10-22 RX ADMIN — HALOPERIDOL DECANOATE 2 MILLIGRAM(S): 100 INJECTION INTRAMUSCULAR at 12:51

## 2019-10-22 RX ADMIN — HALOPERIDOL DECANOATE 2 MILLIGRAM(S): 100 INJECTION INTRAMUSCULAR at 06:12

## 2019-10-22 RX ADMIN — Medication 1 MILLIGRAM(S): at 21:02

## 2019-10-22 RX ADMIN — Medication 25 MILLIGRAM(S): at 09:59

## 2019-10-22 RX ADMIN — HALOPERIDOL DECANOATE 2 MILLIGRAM(S): 100 INJECTION INTRAMUSCULAR at 06:57

## 2019-10-22 RX ADMIN — Medication 25 MILLIGRAM(S): at 21:03

## 2019-10-22 RX ADMIN — HALOPERIDOL DECANOATE 2 MILLIGRAM(S): 100 INJECTION INTRAMUSCULAR at 21:02

## 2019-10-22 RX ADMIN — Medication 1 DROP(S): at 09:07

## 2019-10-22 RX ADMIN — ATORVASTATIN CALCIUM 80 MILLIGRAM(S): 80 TABLET, FILM COATED ORAL at 21:02

## 2019-10-22 RX ADMIN — Medication 4 MILLIGRAM(S): at 09:06

## 2019-10-22 RX ADMIN — Medication 1 MILLIGRAM(S): at 12:51

## 2019-10-22 RX ADMIN — Medication 25 MILLIGRAM(S): at 09:06

## 2019-10-22 RX ADMIN — AMLODIPINE BESYLATE 5 MILLIGRAM(S): 2.5 TABLET ORAL at 09:07

## 2019-10-22 RX ADMIN — Medication 650 MILLIGRAM(S): at 09:59

## 2019-10-23 LAB
GLUCOSE BLDC GLUCOMTR-MCNC: 116 MG/DL — HIGH (ref 70–99)
GLUCOSE BLDC GLUCOMTR-MCNC: 88 MG/DL — SIGNIFICANT CHANGE UP (ref 70–99)

## 2019-10-23 PROCEDURE — 90832 PSYTX W PT 30 MINUTES: CPT

## 2019-10-23 PROCEDURE — 99232 SBSQ HOSP IP/OBS MODERATE 35: CPT

## 2019-10-23 RX ORDER — ACYCLOVIR SODIUM 500 MG
800 VIAL (EA) INTRAVENOUS
Refills: 0 | Status: COMPLETED | OUTPATIENT
Start: 2019-10-23 | End: 2019-10-30

## 2019-10-23 RX ADMIN — AMLODIPINE BESYLATE 5 MILLIGRAM(S): 2.5 TABLET ORAL at 08:39

## 2019-10-23 RX ADMIN — Medication 1 MILLIGRAM(S): at 12:16

## 2019-10-23 RX ADMIN — Medication 1 DROP(S): at 10:16

## 2019-10-23 RX ADMIN — Medication 1 DROP(S): at 20:33

## 2019-10-23 RX ADMIN — Medication 100 MILLIGRAM(S): at 08:39

## 2019-10-23 RX ADMIN — GABAPENTIN 1000 MILLIGRAM(S): 400 CAPSULE ORAL at 20:33

## 2019-10-23 RX ADMIN — GABAPENTIN 1000 MILLIGRAM(S): 400 CAPSULE ORAL at 06:21

## 2019-10-23 RX ADMIN — Medication 25 MILLIGRAM(S): at 12:16

## 2019-10-23 RX ADMIN — Medication 25 MILLIGRAM(S): at 08:39

## 2019-10-23 RX ADMIN — METFORMIN HYDROCHLORIDE 1000 MILLIGRAM(S): 850 TABLET ORAL at 08:39

## 2019-10-23 RX ADMIN — HALOPERIDOL DECANOATE 2 MILLIGRAM(S): 100 INJECTION INTRAMUSCULAR at 20:33

## 2019-10-23 RX ADMIN — Medication 1 DROP(S): at 14:57

## 2019-10-23 RX ADMIN — Medication 800 MILLIGRAM(S): at 20:33

## 2019-10-23 RX ADMIN — Medication 25 MILLIGRAM(S): at 11:50

## 2019-10-23 RX ADMIN — CLOPIDOGREL BISULFATE 75 MILLIGRAM(S): 75 TABLET, FILM COATED ORAL at 08:39

## 2019-10-23 RX ADMIN — LATANOPROST 1 DROP(S): 0.05 SOLUTION/ DROPS OPHTHALMIC; TOPICAL at 20:34

## 2019-10-23 RX ADMIN — Medication 1 APPLICATION(S): at 10:15

## 2019-10-23 RX ADMIN — Medication 1 MILLIGRAM(S): at 06:21

## 2019-10-23 RX ADMIN — Medication 1 MILLIGRAM(S): at 20:33

## 2019-10-23 RX ADMIN — ATORVASTATIN CALCIUM 80 MILLIGRAM(S): 80 TABLET, FILM COATED ORAL at 20:33

## 2019-10-23 RX ADMIN — Medication 4 MILLIGRAM(S): at 08:39

## 2019-10-23 RX ADMIN — HALOPERIDOL DECANOATE 2 MILLIGRAM(S): 100 INJECTION INTRAMUSCULAR at 06:21

## 2019-10-23 RX ADMIN — Medication 81 MILLIGRAM(S): at 08:39

## 2019-10-23 RX ADMIN — Medication 25 MILLIGRAM(S): at 20:33

## 2019-10-23 RX ADMIN — GABAPENTIN 1000 MILLIGRAM(S): 400 CAPSULE ORAL at 12:16

## 2019-10-23 RX ADMIN — METFORMIN HYDROCHLORIDE 1000 MILLIGRAM(S): 850 TABLET ORAL at 20:33

## 2019-10-23 RX ADMIN — HALOPERIDOL DECANOATE 2 MILLIGRAM(S): 100 INJECTION INTRAMUSCULAR at 12:16

## 2019-10-24 LAB
GLUCOSE BLDC GLUCOMTR-MCNC: 128 MG/DL — HIGH (ref 70–99)
GLUCOSE BLDC GLUCOMTR-MCNC: 80 MG/DL — SIGNIFICANT CHANGE UP (ref 70–99)

## 2019-10-24 PROCEDURE — 90832 PSYTX W PT 30 MINUTES: CPT

## 2019-10-24 PROCEDURE — 99232 SBSQ HOSP IP/OBS MODERATE 35: CPT

## 2019-10-24 RX ADMIN — CLOPIDOGREL BISULFATE 75 MILLIGRAM(S): 75 TABLET, FILM COATED ORAL at 08:47

## 2019-10-24 RX ADMIN — GABAPENTIN 1000 MILLIGRAM(S): 400 CAPSULE ORAL at 12:34

## 2019-10-24 RX ADMIN — HALOPERIDOL DECANOATE 2 MILLIGRAM(S): 100 INJECTION INTRAMUSCULAR at 12:59

## 2019-10-24 RX ADMIN — Medication 1 DROP(S): at 08:47

## 2019-10-24 RX ADMIN — METFORMIN HYDROCHLORIDE 1000 MILLIGRAM(S): 850 TABLET ORAL at 20:24

## 2019-10-24 RX ADMIN — Medication 1 MILLIGRAM(S): at 06:48

## 2019-10-24 RX ADMIN — Medication 800 MILLIGRAM(S): at 20:23

## 2019-10-24 RX ADMIN — GABAPENTIN 1000 MILLIGRAM(S): 400 CAPSULE ORAL at 20:23

## 2019-10-24 RX ADMIN — GABAPENTIN 1000 MILLIGRAM(S): 400 CAPSULE ORAL at 06:47

## 2019-10-24 RX ADMIN — Medication 1 MILLIGRAM(S): at 20:24

## 2019-10-24 RX ADMIN — METFORMIN HYDROCHLORIDE 1000 MILLIGRAM(S): 850 TABLET ORAL at 08:47

## 2019-10-24 RX ADMIN — HALOPERIDOL DECANOATE 2 MILLIGRAM(S): 100 INJECTION INTRAMUSCULAR at 06:48

## 2019-10-24 RX ADMIN — Medication 25 MILLIGRAM(S): at 20:24

## 2019-10-24 RX ADMIN — Medication 100 MILLIGRAM(S): at 08:47

## 2019-10-24 RX ADMIN — LATANOPROST 1 DROP(S): 0.05 SOLUTION/ DROPS OPHTHALMIC; TOPICAL at 20:23

## 2019-10-24 RX ADMIN — Medication 4 MILLIGRAM(S): at 08:47

## 2019-10-24 RX ADMIN — Medication 800 MILLIGRAM(S): at 08:47

## 2019-10-24 RX ADMIN — Medication 1 MILLIGRAM(S): at 12:59

## 2019-10-24 RX ADMIN — AMLODIPINE BESYLATE 5 MILLIGRAM(S): 2.5 TABLET ORAL at 08:47

## 2019-10-24 RX ADMIN — HALOPERIDOL DECANOATE 2 MILLIGRAM(S): 100 INJECTION INTRAMUSCULAR at 20:23

## 2019-10-24 RX ADMIN — Medication 25 MILLIGRAM(S): at 12:59

## 2019-10-24 RX ADMIN — Medication 81 MILLIGRAM(S): at 08:47

## 2019-10-24 RX ADMIN — Medication 25 MILLIGRAM(S): at 08:47

## 2019-10-24 RX ADMIN — ATORVASTATIN CALCIUM 80 MILLIGRAM(S): 80 TABLET, FILM COATED ORAL at 20:23

## 2019-10-25 LAB
GLUCOSE BLDC GLUCOMTR-MCNC: 105 MG/DL — HIGH (ref 70–99)
GLUCOSE BLDC GLUCOMTR-MCNC: 83 MG/DL — SIGNIFICANT CHANGE UP (ref 70–99)

## 2019-10-25 PROCEDURE — 99232 SBSQ HOSP IP/OBS MODERATE 35: CPT

## 2019-10-25 RX ORDER — HALOPERIDOL DECANOATE 100 MG/ML
2 INJECTION INTRAMUSCULAR ONCE
Refills: 0 | Status: COMPLETED | OUTPATIENT
Start: 2019-10-25 | End: 2019-10-25

## 2019-10-25 RX ADMIN — HALOPERIDOL DECANOATE 2 MILLIGRAM(S): 100 INJECTION INTRAMUSCULAR at 06:21

## 2019-10-25 RX ADMIN — Medication 800 MILLIGRAM(S): at 10:18

## 2019-10-25 RX ADMIN — Medication 1 DROP(S): at 12:19

## 2019-10-25 RX ADMIN — CLOPIDOGREL BISULFATE 75 MILLIGRAM(S): 75 TABLET, FILM COATED ORAL at 10:18

## 2019-10-25 RX ADMIN — Medication 25 MILLIGRAM(S): at 12:19

## 2019-10-25 RX ADMIN — AMLODIPINE BESYLATE 5 MILLIGRAM(S): 2.5 TABLET ORAL at 10:18

## 2019-10-25 RX ADMIN — GABAPENTIN 1000 MILLIGRAM(S): 400 CAPSULE ORAL at 20:52

## 2019-10-25 RX ADMIN — LATANOPROST 1 DROP(S): 0.05 SOLUTION/ DROPS OPHTHALMIC; TOPICAL at 20:54

## 2019-10-25 RX ADMIN — Medication 4 MILLIGRAM(S): at 10:18

## 2019-10-25 RX ADMIN — HALOPERIDOL DECANOATE 2 MILLIGRAM(S): 100 INJECTION INTRAMUSCULAR at 08:25

## 2019-10-25 RX ADMIN — Medication 1 DROP(S): at 20:53

## 2019-10-25 RX ADMIN — GABAPENTIN 1000 MILLIGRAM(S): 400 CAPSULE ORAL at 06:21

## 2019-10-25 RX ADMIN — Medication 100 MILLIGRAM(S): at 10:18

## 2019-10-25 RX ADMIN — Medication 1 DROP(S): at 10:18

## 2019-10-25 RX ADMIN — Medication 1 MILLIGRAM(S): at 20:52

## 2019-10-25 RX ADMIN — Medication 81 MILLIGRAM(S): at 10:18

## 2019-10-25 RX ADMIN — Medication 1 MILLIGRAM(S): at 12:19

## 2019-10-25 RX ADMIN — Medication 1 MILLIGRAM(S): at 06:21

## 2019-10-25 RX ADMIN — HALOPERIDOL DECANOATE 2 MILLIGRAM(S): 100 INJECTION INTRAMUSCULAR at 12:19

## 2019-10-25 RX ADMIN — GABAPENTIN 1000 MILLIGRAM(S): 400 CAPSULE ORAL at 12:19

## 2019-10-25 RX ADMIN — Medication 25 MILLIGRAM(S): at 20:52

## 2019-10-25 RX ADMIN — Medication 25 MILLIGRAM(S): at 12:13

## 2019-10-25 RX ADMIN — HALOPERIDOL DECANOATE 2 MILLIGRAM(S): 100 INJECTION INTRAMUSCULAR at 20:52

## 2019-10-25 RX ADMIN — ATORVASTATIN CALCIUM 80 MILLIGRAM(S): 80 TABLET, FILM COATED ORAL at 20:51

## 2019-10-25 RX ADMIN — METFORMIN HYDROCHLORIDE 1000 MILLIGRAM(S): 850 TABLET ORAL at 20:52

## 2019-10-25 RX ADMIN — METFORMIN HYDROCHLORIDE 1000 MILLIGRAM(S): 850 TABLET ORAL at 10:18

## 2019-10-25 RX ADMIN — Medication 800 MILLIGRAM(S): at 20:51

## 2019-10-25 RX ADMIN — Medication 1 MILLIGRAM(S): at 08:25

## 2019-10-26 LAB — GLUCOSE BLDC GLUCOMTR-MCNC: 109 MG/DL — HIGH (ref 70–99)

## 2019-10-26 PROCEDURE — 99232 SBSQ HOSP IP/OBS MODERATE 35: CPT

## 2019-10-26 RX ADMIN — HALOPERIDOL DECANOATE 2 MILLIGRAM(S): 100 INJECTION INTRAMUSCULAR at 21:07

## 2019-10-26 RX ADMIN — Medication 25 MILLIGRAM(S): at 12:36

## 2019-10-26 RX ADMIN — METFORMIN HYDROCHLORIDE 1000 MILLIGRAM(S): 850 TABLET ORAL at 21:07

## 2019-10-26 RX ADMIN — ATORVASTATIN CALCIUM 80 MILLIGRAM(S): 80 TABLET, FILM COATED ORAL at 21:07

## 2019-10-26 RX ADMIN — CLOPIDOGREL BISULFATE 75 MILLIGRAM(S): 75 TABLET, FILM COATED ORAL at 08:20

## 2019-10-26 RX ADMIN — Medication 1 DROP(S): at 08:20

## 2019-10-26 RX ADMIN — HALOPERIDOL DECANOATE 2 MILLIGRAM(S): 100 INJECTION INTRAMUSCULAR at 05:52

## 2019-10-26 RX ADMIN — Medication 1 MILLIGRAM(S): at 12:36

## 2019-10-26 RX ADMIN — METFORMIN HYDROCHLORIDE 1000 MILLIGRAM(S): 850 TABLET ORAL at 08:10

## 2019-10-26 RX ADMIN — Medication 1 MILLIGRAM(S): at 05:52

## 2019-10-26 RX ADMIN — Medication 1 DROP(S): at 12:36

## 2019-10-26 RX ADMIN — HALOPERIDOL DECANOATE 2 MILLIGRAM(S): 100 INJECTION INTRAMUSCULAR at 17:10

## 2019-10-26 RX ADMIN — LATANOPROST 1 DROP(S): 0.05 SOLUTION/ DROPS OPHTHALMIC; TOPICAL at 21:14

## 2019-10-26 RX ADMIN — Medication 1 MILLIGRAM(S): at 21:07

## 2019-10-26 RX ADMIN — Medication 1 DROP(S): at 21:06

## 2019-10-26 RX ADMIN — Medication 800 MILLIGRAM(S): at 08:20

## 2019-10-26 RX ADMIN — AMLODIPINE BESYLATE 5 MILLIGRAM(S): 2.5 TABLET ORAL at 08:20

## 2019-10-26 RX ADMIN — Medication 25 MILLIGRAM(S): at 21:07

## 2019-10-26 RX ADMIN — GABAPENTIN 1000 MILLIGRAM(S): 400 CAPSULE ORAL at 21:07

## 2019-10-26 RX ADMIN — HALOPERIDOL DECANOATE 2 MILLIGRAM(S): 100 INJECTION INTRAMUSCULAR at 09:35

## 2019-10-26 RX ADMIN — GABAPENTIN 1000 MILLIGRAM(S): 400 CAPSULE ORAL at 05:52

## 2019-10-26 RX ADMIN — Medication 81 MILLIGRAM(S): at 08:20

## 2019-10-26 RX ADMIN — Medication 0.5 MILLIGRAM(S): at 18:30

## 2019-10-26 RX ADMIN — Medication 25 MILLIGRAM(S): at 08:20

## 2019-10-26 RX ADMIN — Medication 4 MILLIGRAM(S): at 08:20

## 2019-10-26 RX ADMIN — GABAPENTIN 1000 MILLIGRAM(S): 400 CAPSULE ORAL at 12:36

## 2019-10-26 RX ADMIN — Medication 100 MILLIGRAM(S): at 08:10

## 2019-10-26 RX ADMIN — HALOPERIDOL DECANOATE 2 MILLIGRAM(S): 100 INJECTION INTRAMUSCULAR at 12:36

## 2019-10-26 RX ADMIN — Medication 800 MILLIGRAM(S): at 21:06

## 2019-10-27 PROCEDURE — 99232 SBSQ HOSP IP/OBS MODERATE 35: CPT

## 2019-10-27 RX ADMIN — Medication 1 DROP(S): at 21:02

## 2019-10-27 RX ADMIN — LATANOPROST 1 DROP(S): 0.05 SOLUTION/ DROPS OPHTHALMIC; TOPICAL at 20:18

## 2019-10-27 RX ADMIN — HALOPERIDOL DECANOATE 2 MILLIGRAM(S): 100 INJECTION INTRAMUSCULAR at 05:29

## 2019-10-27 RX ADMIN — Medication 1 MILLIGRAM(S): at 12:40

## 2019-10-27 RX ADMIN — Medication 81 MILLIGRAM(S): at 08:31

## 2019-10-27 RX ADMIN — Medication 650 MILLIGRAM(S): at 01:12

## 2019-10-27 RX ADMIN — ATORVASTATIN CALCIUM 80 MILLIGRAM(S): 80 TABLET, FILM COATED ORAL at 21:02

## 2019-10-27 RX ADMIN — Medication 25 MILLIGRAM(S): at 08:32

## 2019-10-27 RX ADMIN — Medication 100 MILLIGRAM(S): at 08:32

## 2019-10-27 RX ADMIN — Medication 650 MILLIGRAM(S): at 01:45

## 2019-10-27 RX ADMIN — Medication 800 MILLIGRAM(S): at 20:18

## 2019-10-27 RX ADMIN — CLOPIDOGREL BISULFATE 75 MILLIGRAM(S): 75 TABLET, FILM COATED ORAL at 08:31

## 2019-10-27 RX ADMIN — GABAPENTIN 1000 MILLIGRAM(S): 400 CAPSULE ORAL at 05:29

## 2019-10-27 RX ADMIN — GABAPENTIN 1000 MILLIGRAM(S): 400 CAPSULE ORAL at 20:18

## 2019-10-27 RX ADMIN — Medication 4 MILLIGRAM(S): at 08:31

## 2019-10-27 RX ADMIN — HALOPERIDOL DECANOATE 2 MILLIGRAM(S): 100 INJECTION INTRAMUSCULAR at 08:30

## 2019-10-27 RX ADMIN — Medication 800 MILLIGRAM(S): at 08:31

## 2019-10-27 RX ADMIN — Medication 1 DROP(S): at 08:31

## 2019-10-27 RX ADMIN — Medication 25 MILLIGRAM(S): at 20:18

## 2019-10-27 RX ADMIN — HALOPERIDOL DECANOATE 2 MILLIGRAM(S): 100 INJECTION INTRAMUSCULAR at 12:40

## 2019-10-27 RX ADMIN — AMLODIPINE BESYLATE 5 MILLIGRAM(S): 2.5 TABLET ORAL at 08:31

## 2019-10-27 RX ADMIN — HALOPERIDOL DECANOATE 2 MILLIGRAM(S): 100 INJECTION INTRAMUSCULAR at 15:49

## 2019-10-27 RX ADMIN — METFORMIN HYDROCHLORIDE 1000 MILLIGRAM(S): 850 TABLET ORAL at 20:18

## 2019-10-27 RX ADMIN — Medication 25 MILLIGRAM(S): at 12:40

## 2019-10-27 RX ADMIN — Medication 1 MILLIGRAM(S): at 05:29

## 2019-10-27 RX ADMIN — HALOPERIDOL DECANOATE 2 MILLIGRAM(S): 100 INJECTION INTRAMUSCULAR at 20:18

## 2019-10-27 RX ADMIN — GABAPENTIN 1000 MILLIGRAM(S): 400 CAPSULE ORAL at 12:40

## 2019-10-27 RX ADMIN — Medication 1 DROP(S): at 12:40

## 2019-10-27 RX ADMIN — METFORMIN HYDROCHLORIDE 1000 MILLIGRAM(S): 850 TABLET ORAL at 08:32

## 2019-10-27 RX ADMIN — Medication 1 MILLIGRAM(S): at 20:18

## 2019-10-28 LAB
GLUCOSE BLDC GLUCOMTR-MCNC: 119 MG/DL — HIGH (ref 70–99)
GLUCOSE BLDC GLUCOMTR-MCNC: 85 MG/DL — SIGNIFICANT CHANGE UP (ref 70–99)

## 2019-10-28 PROCEDURE — 99232 SBSQ HOSP IP/OBS MODERATE 35: CPT

## 2019-10-28 RX ORDER — HALOPERIDOL DECANOATE 100 MG/ML
2 INJECTION INTRAMUSCULAR ONCE
Refills: 0 | Status: COMPLETED | OUTPATIENT
Start: 2019-10-28 | End: 2019-10-28

## 2019-10-28 RX ADMIN — HALOPERIDOL DECANOATE 2 MILLIGRAM(S): 100 INJECTION INTRAMUSCULAR at 05:53

## 2019-10-28 RX ADMIN — Medication 2 MILLIGRAM(S): at 09:38

## 2019-10-28 RX ADMIN — HALOPERIDOL DECANOATE 2 MILLIGRAM(S): 100 INJECTION INTRAMUSCULAR at 09:08

## 2019-10-28 RX ADMIN — Medication 25 MILLIGRAM(S): at 13:54

## 2019-10-28 RX ADMIN — GABAPENTIN 1000 MILLIGRAM(S): 400 CAPSULE ORAL at 05:53

## 2019-10-28 RX ADMIN — Medication 1 DROP(S): at 21:48

## 2019-10-28 RX ADMIN — HALOPERIDOL DECANOATE 2 MILLIGRAM(S): 100 INJECTION INTRAMUSCULAR at 13:54

## 2019-10-28 RX ADMIN — Medication 4 MILLIGRAM(S): at 09:07

## 2019-10-28 RX ADMIN — CLOPIDOGREL BISULFATE 75 MILLIGRAM(S): 75 TABLET, FILM COATED ORAL at 09:07

## 2019-10-28 RX ADMIN — Medication 81 MILLIGRAM(S): at 09:07

## 2019-10-28 RX ADMIN — LATANOPROST 1 DROP(S): 0.05 SOLUTION/ DROPS OPHTHALMIC; TOPICAL at 21:48

## 2019-10-28 RX ADMIN — Medication 800 MILLIGRAM(S): at 09:05

## 2019-10-28 RX ADMIN — HALOPERIDOL DECANOATE 2 MILLIGRAM(S): 100 INJECTION INTRAMUSCULAR at 09:38

## 2019-10-28 RX ADMIN — GABAPENTIN 1000 MILLIGRAM(S): 400 CAPSULE ORAL at 21:48

## 2019-10-28 RX ADMIN — METFORMIN HYDROCHLORIDE 1000 MILLIGRAM(S): 850 TABLET ORAL at 09:07

## 2019-10-28 RX ADMIN — ATORVASTATIN CALCIUM 80 MILLIGRAM(S): 80 TABLET, FILM COATED ORAL at 21:48

## 2019-10-28 RX ADMIN — Medication 25 MILLIGRAM(S): at 21:48

## 2019-10-28 RX ADMIN — Medication 1 MILLIGRAM(S): at 13:54

## 2019-10-28 RX ADMIN — Medication 100 MILLIGRAM(S): at 09:07

## 2019-10-28 RX ADMIN — GABAPENTIN 1000 MILLIGRAM(S): 400 CAPSULE ORAL at 13:54

## 2019-10-28 RX ADMIN — Medication 1 MILLIGRAM(S): at 21:48

## 2019-10-28 RX ADMIN — Medication 800 MILLIGRAM(S): at 21:48

## 2019-10-28 RX ADMIN — AMLODIPINE BESYLATE 5 MILLIGRAM(S): 2.5 TABLET ORAL at 09:05

## 2019-10-28 RX ADMIN — HALOPERIDOL DECANOATE 2 MILLIGRAM(S): 100 INJECTION INTRAMUSCULAR at 21:48

## 2019-10-28 RX ADMIN — METFORMIN HYDROCHLORIDE 1000 MILLIGRAM(S): 850 TABLET ORAL at 21:48

## 2019-10-28 RX ADMIN — Medication 1 DROP(S): at 09:05

## 2019-10-28 RX ADMIN — Medication 1 MILLIGRAM(S): at 05:54

## 2019-10-28 RX ADMIN — Medication 25 MILLIGRAM(S): at 09:07

## 2019-10-28 RX ADMIN — Medication 1 DROP(S): at 13:49

## 2019-10-29 LAB — GLUCOSE BLDC GLUCOMTR-MCNC: 90 MG/DL — SIGNIFICANT CHANGE UP (ref 70–99)

## 2019-10-29 PROCEDURE — 99232 SBSQ HOSP IP/OBS MODERATE 35: CPT

## 2019-10-29 RX ORDER — HALOPERIDOL DECANOATE 100 MG/ML
2 INJECTION INTRAMUSCULAR ONCE
Refills: 0 | Status: COMPLETED | OUTPATIENT
Start: 2019-10-29 | End: 2019-10-29

## 2019-10-29 RX ADMIN — LATANOPROST 1 DROP(S): 0.05 SOLUTION/ DROPS OPHTHALMIC; TOPICAL at 20:10

## 2019-10-29 RX ADMIN — Medication 4 MILLIGRAM(S): at 10:15

## 2019-10-29 RX ADMIN — AMLODIPINE BESYLATE 5 MILLIGRAM(S): 2.5 TABLET ORAL at 10:15

## 2019-10-29 RX ADMIN — Medication 25 MILLIGRAM(S): at 10:16

## 2019-10-29 RX ADMIN — HALOPERIDOL DECANOATE 2 MILLIGRAM(S): 100 INJECTION INTRAMUSCULAR at 09:12

## 2019-10-29 RX ADMIN — Medication 650 MILLIGRAM(S): at 05:45

## 2019-10-29 RX ADMIN — Medication 800 MILLIGRAM(S): at 22:31

## 2019-10-29 RX ADMIN — GABAPENTIN 1000 MILLIGRAM(S): 400 CAPSULE ORAL at 20:09

## 2019-10-29 RX ADMIN — Medication 81 MILLIGRAM(S): at 10:15

## 2019-10-29 RX ADMIN — Medication 1 MILLIGRAM(S): at 05:04

## 2019-10-29 RX ADMIN — GABAPENTIN 1000 MILLIGRAM(S): 400 CAPSULE ORAL at 14:12

## 2019-10-29 RX ADMIN — METFORMIN HYDROCHLORIDE 1000 MILLIGRAM(S): 850 TABLET ORAL at 20:10

## 2019-10-29 RX ADMIN — Medication 25 MILLIGRAM(S): at 20:10

## 2019-10-29 RX ADMIN — HALOPERIDOL DECANOATE 2 MILLIGRAM(S): 100 INJECTION INTRAMUSCULAR at 05:04

## 2019-10-29 RX ADMIN — GABAPENTIN 1000 MILLIGRAM(S): 400 CAPSULE ORAL at 05:04

## 2019-10-29 RX ADMIN — Medication 1 DROP(S): at 10:15

## 2019-10-29 RX ADMIN — Medication 1 MILLIGRAM(S): at 20:11

## 2019-10-29 RX ADMIN — Medication 650 MILLIGRAM(S): at 05:04

## 2019-10-29 RX ADMIN — Medication 100 MILLIGRAM(S): at 10:16

## 2019-10-29 RX ADMIN — Medication 25 MILLIGRAM(S): at 14:12

## 2019-10-29 RX ADMIN — ATORVASTATIN CALCIUM 80 MILLIGRAM(S): 80 TABLET, FILM COATED ORAL at 20:09

## 2019-10-29 RX ADMIN — CLOPIDOGREL BISULFATE 75 MILLIGRAM(S): 75 TABLET, FILM COATED ORAL at 10:15

## 2019-10-29 RX ADMIN — Medication 1 MILLIGRAM(S): at 14:12

## 2019-10-29 RX ADMIN — METFORMIN HYDROCHLORIDE 1000 MILLIGRAM(S): 850 TABLET ORAL at 10:16

## 2019-10-29 RX ADMIN — Medication 800 MILLIGRAM(S): at 10:15

## 2019-10-29 RX ADMIN — Medication 1 DROP(S): at 22:31

## 2019-10-29 RX ADMIN — Medication 1 DROP(S): at 14:11

## 2019-10-29 RX ADMIN — Medication 2 MILLIGRAM(S): at 09:13

## 2019-10-29 RX ADMIN — HALOPERIDOL DECANOATE 2 MILLIGRAM(S): 100 INJECTION INTRAMUSCULAR at 14:12

## 2019-10-29 RX ADMIN — HALOPERIDOL DECANOATE 2 MILLIGRAM(S): 100 INJECTION INTRAMUSCULAR at 20:10

## 2019-10-30 LAB
GLUCOSE BLDC GLUCOMTR-MCNC: 122 MG/DL — HIGH (ref 70–99)
GLUCOSE BLDC GLUCOMTR-MCNC: 84 MG/DL — SIGNIFICANT CHANGE UP (ref 70–99)

## 2019-10-30 PROCEDURE — 99232 SBSQ HOSP IP/OBS MODERATE 35: CPT

## 2019-10-30 RX ORDER — DIVALPROEX SODIUM 500 MG/1
250 TABLET, DELAYED RELEASE ORAL
Refills: 0 | Status: DISCONTINUED | OUTPATIENT
Start: 2019-10-30 | End: 2019-11-05

## 2019-10-30 RX ADMIN — Medication 25 MILLIGRAM(S): at 21:03

## 2019-10-30 RX ADMIN — Medication 1 DROP(S): at 13:17

## 2019-10-30 RX ADMIN — LATANOPROST 1 DROP(S): 0.05 SOLUTION/ DROPS OPHTHALMIC; TOPICAL at 21:03

## 2019-10-30 RX ADMIN — DIVALPROEX SODIUM 250 MILLIGRAM(S): 500 TABLET, DELAYED RELEASE ORAL at 21:03

## 2019-10-30 RX ADMIN — Medication 1 DROP(S): at 09:15

## 2019-10-30 RX ADMIN — Medication 800 MILLIGRAM(S): at 09:15

## 2019-10-30 RX ADMIN — ATORVASTATIN CALCIUM 80 MILLIGRAM(S): 80 TABLET, FILM COATED ORAL at 21:03

## 2019-10-30 RX ADMIN — HALOPERIDOL DECANOATE 2 MILLIGRAM(S): 100 INJECTION INTRAMUSCULAR at 05:31

## 2019-10-30 RX ADMIN — GABAPENTIN 1000 MILLIGRAM(S): 400 CAPSULE ORAL at 13:17

## 2019-10-30 RX ADMIN — GABAPENTIN 1000 MILLIGRAM(S): 400 CAPSULE ORAL at 05:31

## 2019-10-30 RX ADMIN — Medication 1 MILLIGRAM(S): at 13:18

## 2019-10-30 RX ADMIN — Medication 25 MILLIGRAM(S): at 09:15

## 2019-10-30 RX ADMIN — HALOPERIDOL DECANOATE 2 MILLIGRAM(S): 100 INJECTION INTRAMUSCULAR at 05:34

## 2019-10-30 RX ADMIN — DIVALPROEX SODIUM 250 MILLIGRAM(S): 500 TABLET, DELAYED RELEASE ORAL at 09:15

## 2019-10-30 RX ADMIN — Medication 1 MILLIGRAM(S): at 20:59

## 2019-10-30 RX ADMIN — GABAPENTIN 1000 MILLIGRAM(S): 400 CAPSULE ORAL at 20:59

## 2019-10-30 RX ADMIN — HALOPERIDOL DECANOATE 2 MILLIGRAM(S): 100 INJECTION INTRAMUSCULAR at 21:03

## 2019-10-30 RX ADMIN — METFORMIN HYDROCHLORIDE 1000 MILLIGRAM(S): 850 TABLET ORAL at 21:03

## 2019-10-30 RX ADMIN — Medication 1 DROP(S): at 21:03

## 2019-10-30 RX ADMIN — HALOPERIDOL DECANOATE 2 MILLIGRAM(S): 100 INJECTION INTRAMUSCULAR at 13:17

## 2019-10-30 RX ADMIN — METFORMIN HYDROCHLORIDE 1000 MILLIGRAM(S): 850 TABLET ORAL at 09:15

## 2019-10-30 RX ADMIN — Medication 4 MILLIGRAM(S): at 09:15

## 2019-10-30 RX ADMIN — Medication 81 MILLIGRAM(S): at 09:15

## 2019-10-30 RX ADMIN — CLOPIDOGREL BISULFATE 75 MILLIGRAM(S): 75 TABLET, FILM COATED ORAL at 09:15

## 2019-10-30 RX ADMIN — Medication 25 MILLIGRAM(S): at 13:18

## 2019-10-31 LAB
GLUCOSE BLDC GLUCOMTR-MCNC: 118 MG/DL — HIGH (ref 70–99)
GLUCOSE BLDC GLUCOMTR-MCNC: 90 MG/DL — SIGNIFICANT CHANGE UP (ref 70–99)

## 2019-10-31 PROCEDURE — 99232 SBSQ HOSP IP/OBS MODERATE 35: CPT

## 2019-10-31 RX ORDER — AMANTADINE HCL 100 MG
100 CAPSULE ORAL
Refills: 0 | Status: DISCONTINUED | OUTPATIENT
Start: 2019-10-31 | End: 2019-11-15

## 2019-10-31 RX ORDER — LANOLIN ALCOHOL/MO/W.PET/CERES
5 CREAM (GRAM) TOPICAL ONCE
Refills: 0 | Status: COMPLETED | OUTPATIENT
Start: 2019-10-31 | End: 2019-10-31

## 2019-10-31 RX ADMIN — Medication 1 DROP(S): at 20:48

## 2019-10-31 RX ADMIN — HALOPERIDOL DECANOATE 2 MILLIGRAM(S): 100 INJECTION INTRAMUSCULAR at 05:56

## 2019-10-31 RX ADMIN — Medication 1 MILLIGRAM(S): at 05:56

## 2019-10-31 RX ADMIN — Medication 1 DROP(S): at 09:17

## 2019-10-31 RX ADMIN — HALOPERIDOL DECANOATE 2 MILLIGRAM(S): 100 INJECTION INTRAMUSCULAR at 20:42

## 2019-10-31 RX ADMIN — Medication 25 MILLIGRAM(S): at 09:17

## 2019-10-31 RX ADMIN — Medication 5 MILLIGRAM(S): at 22:12

## 2019-10-31 RX ADMIN — Medication 1 MILLIGRAM(S): at 13:03

## 2019-10-31 RX ADMIN — LATANOPROST 1 DROP(S): 0.05 SOLUTION/ DROPS OPHTHALMIC; TOPICAL at 21:41

## 2019-10-31 RX ADMIN — GABAPENTIN 1000 MILLIGRAM(S): 400 CAPSULE ORAL at 13:03

## 2019-10-31 RX ADMIN — Medication 25 MILLIGRAM(S): at 13:03

## 2019-10-31 RX ADMIN — Medication 100 MILLIGRAM(S): at 09:17

## 2019-10-31 RX ADMIN — Medication 100 MILLIGRAM(S): at 20:42

## 2019-10-31 RX ADMIN — Medication 650 MILLIGRAM(S): at 05:00

## 2019-10-31 RX ADMIN — METFORMIN HYDROCHLORIDE 1000 MILLIGRAM(S): 850 TABLET ORAL at 20:43

## 2019-10-31 RX ADMIN — ATORVASTATIN CALCIUM 80 MILLIGRAM(S): 80 TABLET, FILM COATED ORAL at 20:42

## 2019-10-31 RX ADMIN — GABAPENTIN 1000 MILLIGRAM(S): 400 CAPSULE ORAL at 05:56

## 2019-10-31 RX ADMIN — Medication 1 MILLIGRAM(S): at 20:43

## 2019-10-31 RX ADMIN — METFORMIN HYDROCHLORIDE 1000 MILLIGRAM(S): 850 TABLET ORAL at 09:17

## 2019-10-31 RX ADMIN — Medication 4 MILLIGRAM(S): at 09:17

## 2019-10-31 RX ADMIN — Medication 25 MILLIGRAM(S): at 20:43

## 2019-10-31 RX ADMIN — GABAPENTIN 1000 MILLIGRAM(S): 400 CAPSULE ORAL at 20:42

## 2019-10-31 RX ADMIN — HALOPERIDOL DECANOATE 2 MILLIGRAM(S): 100 INJECTION INTRAMUSCULAR at 13:03

## 2019-10-31 RX ADMIN — Medication 650 MILLIGRAM(S): at 06:00

## 2019-10-31 RX ADMIN — DIVALPROEX SODIUM 250 MILLIGRAM(S): 500 TABLET, DELAYED RELEASE ORAL at 09:17

## 2019-10-31 RX ADMIN — AMLODIPINE BESYLATE 5 MILLIGRAM(S): 2.5 TABLET ORAL at 09:17

## 2019-10-31 RX ADMIN — Medication 1 DROP(S): at 13:02

## 2019-10-31 RX ADMIN — DIVALPROEX SODIUM 250 MILLIGRAM(S): 500 TABLET, DELAYED RELEASE ORAL at 20:42

## 2019-10-31 RX ADMIN — Medication 81 MILLIGRAM(S): at 09:17

## 2019-10-31 RX ADMIN — CLOPIDOGREL BISULFATE 75 MILLIGRAM(S): 75 TABLET, FILM COATED ORAL at 09:17

## 2019-11-01 LAB
GLUCOSE BLDC GLUCOMTR-MCNC: 100 MG/DL — HIGH (ref 70–99)
GLUCOSE BLDC GLUCOMTR-MCNC: 56 MG/DL — LOW (ref 70–99)
GLUCOSE BLDC GLUCOMTR-MCNC: 98 MG/DL — SIGNIFICANT CHANGE UP (ref 70–99)

## 2019-11-01 PROCEDURE — 99232 SBSQ HOSP IP/OBS MODERATE 35: CPT

## 2019-11-01 RX ADMIN — Medication 4 MILLIGRAM(S): at 09:11

## 2019-11-01 RX ADMIN — HALOPERIDOL DECANOATE 2 MILLIGRAM(S): 100 INJECTION INTRAMUSCULAR at 06:26

## 2019-11-01 RX ADMIN — Medication 100 MILLIGRAM(S): at 09:11

## 2019-11-01 RX ADMIN — Medication 1 DROP(S): at 21:56

## 2019-11-01 RX ADMIN — GABAPENTIN 1000 MILLIGRAM(S): 400 CAPSULE ORAL at 06:26

## 2019-11-01 RX ADMIN — HALOPERIDOL DECANOATE 2 MILLIGRAM(S): 100 INJECTION INTRAMUSCULAR at 13:06

## 2019-11-01 RX ADMIN — LATANOPROST 1 DROP(S): 0.05 SOLUTION/ DROPS OPHTHALMIC; TOPICAL at 21:15

## 2019-11-01 RX ADMIN — METFORMIN HYDROCHLORIDE 1000 MILLIGRAM(S): 850 TABLET ORAL at 09:11

## 2019-11-01 RX ADMIN — DIVALPROEX SODIUM 250 MILLIGRAM(S): 500 TABLET, DELAYED RELEASE ORAL at 09:11

## 2019-11-01 RX ADMIN — Medication 25 MILLIGRAM(S): at 13:06

## 2019-11-01 RX ADMIN — GABAPENTIN 1000 MILLIGRAM(S): 400 CAPSULE ORAL at 13:06

## 2019-11-01 RX ADMIN — Medication 81 MILLIGRAM(S): at 09:11

## 2019-11-01 RX ADMIN — Medication 1 MILLIGRAM(S): at 06:26

## 2019-11-01 RX ADMIN — Medication 1 DROP(S): at 09:12

## 2019-11-01 RX ADMIN — DIVALPROEX SODIUM 250 MILLIGRAM(S): 500 TABLET, DELAYED RELEASE ORAL at 21:14

## 2019-11-01 RX ADMIN — Medication 25 MILLIGRAM(S): at 21:15

## 2019-11-01 RX ADMIN — Medication 1 MILLIGRAM(S): at 13:06

## 2019-11-01 RX ADMIN — Medication 1 DROP(S): at 13:05

## 2019-11-01 RX ADMIN — Medication 1 MILLIGRAM(S): at 20:52

## 2019-11-01 RX ADMIN — ATORVASTATIN CALCIUM 80 MILLIGRAM(S): 80 TABLET, FILM COATED ORAL at 21:14

## 2019-11-01 RX ADMIN — METFORMIN HYDROCHLORIDE 1000 MILLIGRAM(S): 850 TABLET ORAL at 21:15

## 2019-11-01 RX ADMIN — Medication 25 MILLIGRAM(S): at 09:11

## 2019-11-01 RX ADMIN — GABAPENTIN 1000 MILLIGRAM(S): 400 CAPSULE ORAL at 20:52

## 2019-11-01 RX ADMIN — Medication 100 MILLIGRAM(S): at 21:14

## 2019-11-01 RX ADMIN — HALOPERIDOL DECANOATE 2 MILLIGRAM(S): 100 INJECTION INTRAMUSCULAR at 21:14

## 2019-11-01 RX ADMIN — CLOPIDOGREL BISULFATE 75 MILLIGRAM(S): 75 TABLET, FILM COATED ORAL at 09:11

## 2019-11-01 RX ADMIN — AMLODIPINE BESYLATE 5 MILLIGRAM(S): 2.5 TABLET ORAL at 09:12

## 2019-11-02 LAB
GLUCOSE BLDC GLUCOMTR-MCNC: 110 MG/DL — HIGH (ref 70–99)
GLUCOSE BLDC GLUCOMTR-MCNC: 56 MG/DL — LOW (ref 70–99)
GLUCOSE BLDC GLUCOMTR-MCNC: 71 MG/DL — SIGNIFICANT CHANGE UP (ref 70–99)
GLUCOSE BLDC GLUCOMTR-MCNC: 89 MG/DL — SIGNIFICANT CHANGE UP (ref 70–99)

## 2019-11-02 PROCEDURE — 99231 SBSQ HOSP IP/OBS SF/LOW 25: CPT

## 2019-11-02 RX ADMIN — DIVALPROEX SODIUM 250 MILLIGRAM(S): 500 TABLET, DELAYED RELEASE ORAL at 21:16

## 2019-11-02 RX ADMIN — LATANOPROST 1 DROP(S): 0.05 SOLUTION/ DROPS OPHTHALMIC; TOPICAL at 21:16

## 2019-11-02 RX ADMIN — Medication 25 MILLIGRAM(S): at 12:54

## 2019-11-02 RX ADMIN — Medication 1 MILLIGRAM(S): at 20:33

## 2019-11-02 RX ADMIN — Medication 1 DROP(S): at 12:55

## 2019-11-02 RX ADMIN — GABAPENTIN 1000 MILLIGRAM(S): 400 CAPSULE ORAL at 12:55

## 2019-11-02 RX ADMIN — Medication 81 MILLIGRAM(S): at 09:11

## 2019-11-02 RX ADMIN — Medication 1 MILLIGRAM(S): at 12:06

## 2019-11-02 RX ADMIN — AMLODIPINE BESYLATE 5 MILLIGRAM(S): 2.5 TABLET ORAL at 09:11

## 2019-11-02 RX ADMIN — Medication 25 MILLIGRAM(S): at 09:12

## 2019-11-02 RX ADMIN — Medication 4 MILLIGRAM(S): at 09:11

## 2019-11-02 RX ADMIN — Medication 100 MILLIGRAM(S): at 09:11

## 2019-11-02 RX ADMIN — HALOPERIDOL DECANOATE 2 MILLIGRAM(S): 100 INJECTION INTRAMUSCULAR at 15:57

## 2019-11-02 RX ADMIN — DIVALPROEX SODIUM 250 MILLIGRAM(S): 500 TABLET, DELAYED RELEASE ORAL at 09:11

## 2019-11-02 RX ADMIN — Medication 25 MILLIGRAM(S): at 21:16

## 2019-11-02 RX ADMIN — ATORVASTATIN CALCIUM 80 MILLIGRAM(S): 80 TABLET, FILM COATED ORAL at 21:16

## 2019-11-02 RX ADMIN — Medication 100 MILLIGRAM(S): at 09:12

## 2019-11-02 RX ADMIN — Medication 1 DROP(S): at 21:15

## 2019-11-02 RX ADMIN — HALOPERIDOL DECANOATE 2 MILLIGRAM(S): 100 INJECTION INTRAMUSCULAR at 21:16

## 2019-11-02 RX ADMIN — METFORMIN HYDROCHLORIDE 1000 MILLIGRAM(S): 850 TABLET ORAL at 09:12

## 2019-11-02 RX ADMIN — HALOPERIDOL DECANOATE 2 MILLIGRAM(S): 100 INJECTION INTRAMUSCULAR at 07:00

## 2019-11-02 RX ADMIN — Medication 100 MILLIGRAM(S): at 21:15

## 2019-11-02 RX ADMIN — Medication 1 MILLIGRAM(S): at 07:00

## 2019-11-02 RX ADMIN — GABAPENTIN 1000 MILLIGRAM(S): 400 CAPSULE ORAL at 07:00

## 2019-11-02 RX ADMIN — CLOPIDOGREL BISULFATE 75 MILLIGRAM(S): 75 TABLET, FILM COATED ORAL at 09:11

## 2019-11-02 RX ADMIN — Medication 1 DROP(S): at 09:11

## 2019-11-02 RX ADMIN — HALOPERIDOL DECANOATE 2 MILLIGRAM(S): 100 INJECTION INTRAMUSCULAR at 12:55

## 2019-11-02 RX ADMIN — GABAPENTIN 1000 MILLIGRAM(S): 400 CAPSULE ORAL at 20:33

## 2019-11-02 RX ADMIN — METFORMIN HYDROCHLORIDE 1000 MILLIGRAM(S): 850 TABLET ORAL at 21:44

## 2019-11-03 LAB
ALBUMIN SERPL ELPH-MCNC: 3.7 G/DL — SIGNIFICANT CHANGE UP (ref 3.3–5)
ALP SERPL-CCNC: 91 U/L — SIGNIFICANT CHANGE UP (ref 40–120)
ALT FLD-CCNC: 8 U/L — SIGNIFICANT CHANGE UP (ref 4–41)
ANION GAP SERPL CALC-SCNC: 14 MMO/L — SIGNIFICANT CHANGE UP (ref 7–14)
AST SERPL-CCNC: 10 U/L — SIGNIFICANT CHANGE UP (ref 4–40)
BILIRUB SERPL-MCNC: 0.5 MG/DL — SIGNIFICANT CHANGE UP (ref 0.2–1.2)
BUN SERPL-MCNC: 8 MG/DL — SIGNIFICANT CHANGE UP (ref 7–23)
CALCIUM SERPL-MCNC: 9.1 MG/DL — SIGNIFICANT CHANGE UP (ref 8.4–10.5)
CHLORIDE SERPL-SCNC: 101 MMOL/L — SIGNIFICANT CHANGE UP (ref 98–107)
CO2 SERPL-SCNC: 25 MMOL/L — SIGNIFICANT CHANGE UP (ref 22–31)
CREAT SERPL-MCNC: 0.66 MG/DL — SIGNIFICANT CHANGE UP (ref 0.5–1.3)
GLUCOSE BLDC GLUCOMTR-MCNC: 109 MG/DL — HIGH (ref 70–99)
GLUCOSE BLDC GLUCOMTR-MCNC: 155 MG/DL — HIGH (ref 70–99)
GLUCOSE BLDC GLUCOMTR-MCNC: 65 MG/DL — LOW (ref 70–99)
GLUCOSE SERPL-MCNC: 149 MG/DL — HIGH (ref 70–99)
POTASSIUM SERPL-MCNC: 4.1 MMOL/L — SIGNIFICANT CHANGE UP (ref 3.5–5.3)
POTASSIUM SERPL-SCNC: 4.1 MMOL/L — SIGNIFICANT CHANGE UP (ref 3.5–5.3)
PROT SERPL-MCNC: 6.6 G/DL — SIGNIFICANT CHANGE UP (ref 6–8.3)
SODIUM SERPL-SCNC: 140 MMOL/L — SIGNIFICANT CHANGE UP (ref 135–145)

## 2019-11-03 PROCEDURE — 99233 SBSQ HOSP IP/OBS HIGH 50: CPT

## 2019-11-03 PROCEDURE — 99231 SBSQ HOSP IP/OBS SF/LOW 25: CPT

## 2019-11-03 RX ORDER — GLIMEPIRIDE 1 MG
2 TABLET ORAL
Refills: 0 | Status: COMPLETED | OUTPATIENT
Start: 2019-11-03 | End: 2019-11-03

## 2019-11-03 RX ORDER — HYDROCORTISONE 1 %
1 OINTMENT (GRAM) TOPICAL
Refills: 0 | Status: COMPLETED | OUTPATIENT
Start: 2019-11-03 | End: 2019-11-05

## 2019-11-03 RX ORDER — GLIMEPIRIDE 1 MG
2 TABLET ORAL
Refills: 0 | Status: DISCONTINUED | OUTPATIENT
Start: 2019-11-04 | End: 2019-11-04

## 2019-11-03 RX ADMIN — Medication 1 MILLIGRAM(S): at 13:11

## 2019-11-03 RX ADMIN — AMLODIPINE BESYLATE 5 MILLIGRAM(S): 2.5 TABLET ORAL at 08:58

## 2019-11-03 RX ADMIN — METFORMIN HYDROCHLORIDE 1000 MILLIGRAM(S): 850 TABLET ORAL at 08:59

## 2019-11-03 RX ADMIN — Medication 1 APPLICATION(S): at 21:07

## 2019-11-03 RX ADMIN — GABAPENTIN 1000 MILLIGRAM(S): 400 CAPSULE ORAL at 13:11

## 2019-11-03 RX ADMIN — Medication 100 MILLIGRAM(S): at 08:58

## 2019-11-03 RX ADMIN — Medication 25 MILLIGRAM(S): at 21:07

## 2019-11-03 RX ADMIN — HALOPERIDOL DECANOATE 2 MILLIGRAM(S): 100 INJECTION INTRAMUSCULAR at 06:27

## 2019-11-03 RX ADMIN — Medication 2 MILLIGRAM(S): at 09:18

## 2019-11-03 RX ADMIN — HALOPERIDOL DECANOATE 2 MILLIGRAM(S): 100 INJECTION INTRAMUSCULAR at 14:00

## 2019-11-03 RX ADMIN — DIVALPROEX SODIUM 250 MILLIGRAM(S): 500 TABLET, DELAYED RELEASE ORAL at 21:07

## 2019-11-03 RX ADMIN — Medication 25 MILLIGRAM(S): at 13:12

## 2019-11-03 RX ADMIN — HALOPERIDOL DECANOATE 2 MILLIGRAM(S): 100 INJECTION INTRAMUSCULAR at 21:07

## 2019-11-03 RX ADMIN — Medication 1 MILLIGRAM(S): at 21:07

## 2019-11-03 RX ADMIN — Medication 1 DROP(S): at 08:58

## 2019-11-03 RX ADMIN — Medication 100 MILLIGRAM(S): at 08:59

## 2019-11-03 RX ADMIN — DIVALPROEX SODIUM 250 MILLIGRAM(S): 500 TABLET, DELAYED RELEASE ORAL at 08:59

## 2019-11-03 RX ADMIN — Medication 1 DROP(S): at 13:11

## 2019-11-03 RX ADMIN — LATANOPROST 1 DROP(S): 0.05 SOLUTION/ DROPS OPHTHALMIC; TOPICAL at 21:39

## 2019-11-03 RX ADMIN — Medication 25 MILLIGRAM(S): at 08:59

## 2019-11-03 RX ADMIN — HALOPERIDOL DECANOATE 2 MILLIGRAM(S): 100 INJECTION INTRAMUSCULAR at 13:11

## 2019-11-03 RX ADMIN — METFORMIN HYDROCHLORIDE 1000 MILLIGRAM(S): 850 TABLET ORAL at 21:08

## 2019-11-03 RX ADMIN — ATORVASTATIN CALCIUM 80 MILLIGRAM(S): 80 TABLET, FILM COATED ORAL at 21:07

## 2019-11-03 RX ADMIN — CLOPIDOGREL BISULFATE 75 MILLIGRAM(S): 75 TABLET, FILM COATED ORAL at 08:59

## 2019-11-03 RX ADMIN — GABAPENTIN 1000 MILLIGRAM(S): 400 CAPSULE ORAL at 21:07

## 2019-11-03 RX ADMIN — Medication 1 APPLICATION(S): at 09:18

## 2019-11-03 RX ADMIN — Medication 1 MILLIGRAM(S): at 06:27

## 2019-11-03 RX ADMIN — Medication 81 MILLIGRAM(S): at 08:58

## 2019-11-03 RX ADMIN — GABAPENTIN 1000 MILLIGRAM(S): 400 CAPSULE ORAL at 06:27

## 2019-11-03 RX ADMIN — Medication 1 DROP(S): at 21:39

## 2019-11-03 RX ADMIN — Medication 100 MILLIGRAM(S): at 21:07

## 2019-11-03 NOTE — PROGRESS NOTE ADULT - ASSESSMENT
74M on with CLL, T2DM previously on stable home oral hyperglycemic regimen now have recurrent hypoglycemia. episodes tend to occur in the PM. DE SOUZA likely contributing to the hypoglycemia episodes in setting of decreased carb intake when compared to home.     #Type 2 DM with Hypoglycemic episodes  - check CMP and Cr to rule-out ABDIRASHID which can contribute to decrease DE SOUZA clearance  - encourage oral intake to patient, and evaluate psychotropic meds which might suppress patient's appetite.  - decrease Glimepiride to 2mg qd and c/t monitor FS. if c/t have further Hypoglycemic episodes, would d/c Glimepiride.  - discussed with Dr. Jesus

## 2019-11-03 NOTE — PROGRESS NOTE ADULT - SUBJECTIVE AND OBJECTIVE BOX
CC/Reason for Consult: Hypoglycemia    SUBJECTIVE / OVERNIGHT EVENTS:  Called by Primary team for hypoglycemia. Patient was admitted on 10/12/19, been on home DM regimen of Metformin, Sitagliptin, and Glimepiride since. For the past two days patient started having episodes of hypoglycemia in the afternoon. Per nursing, patient has not been eating well and not completing his meals. Patient states he doesn't have the appetite and he doesn't like the food here. At home he usuallly have poor diet and eats mostly potato chips. He had tremors with low blood sugar.   He doesn't have nausea, chills, rhinorrhea, couching, diarrhea, or urinary complaints.     MEDICATIONS  (STANDING):  amantadine 100 milliGRAM(s) Oral two times a day  amLODIPine   Tablet 5 milliGRAM(s) Oral daily  artificial  tears Solution 1 Drop(s) Both EYES three times a day  aspirin enteric coated 81 milliGRAM(s) Oral daily  atorvastatin 80 milliGRAM(s) Oral at bedtime  clopidogrel Tablet 75 milliGRAM(s) Oral daily  dextrose 5%. 1000 milliLiter(s) (50 mL/Hr) IV Continuous <Continuous>  dextrose 50% Injectable 12.5 Gram(s) IV Push once  dextrose 50% Injectable 25 Gram(s) IV Push once  dextrose 50% Injectable 25 Gram(s) IV Push once  diVALproex  milliGRAM(s) Oral two times a day  gabapentin 1000 milliGRAM(s) Oral <User Schedule>  haloperidol     Tablet 2 milliGRAM(s) Oral <User Schedule>  hydrocortisone 1% Cream 1 Application(s) Topical two times a day  insulin lispro (HumaLOG) corrective regimen sliding scale   SubCutaneous three times a day before meals  insulin lispro (HumaLOG) corrective regimen sliding scale   SubCutaneous at bedtime  latanoprost 0.005% Ophthalmic Solution 1 Drop(s) Both EYES at bedtime  LORazepam     Tablet 1 milliGRAM(s) Oral <User Schedule>  meclizine 25 milliGRAM(s) Oral three times a day  metFORMIN 1000 milliGRAM(s) Oral two times a day  metoprolol succinate  milliGRAM(s) Oral daily  sitaGLIPtin 100 milliGRAM(s) Oral daily    MEDICATIONS  (PRN):  acetaminophen   Tablet .. 650 milliGRAM(s) Oral every 6 hours PRN Mild Pain (1 - 3)  ALBUTerol    90 MICROgram(s) HFA Inhaler 2 Puff(s) Inhalation every 6 hours PRN wheezing  dextrose 40% Gel 15 Gram(s) Oral once PRN Blood Glucose LESS THAN 70 milliGRAM(s)/deciliter  diphenhydrAMINE 25 milliGRAM(s) Oral every 6 hours PRN Rash and/or Itching  glucagon  Injectable 1 milliGRAM(s) IntraMuscular once PRN Glucose LESS THAN 70 milligrams/deciliter  haloperidol     Tablet 2 milliGRAM(s) Oral every 6 hours PRN agitation  haloperidol    Injectable 2 milliGRAM(s) IntraMuscular once PRN severe agitation  LORazepam   Injectable 2 milliGRAM(s) IntraMuscular once PRN agitation      Vital Signs Last 24 Hrs  T(C): 36.7 (03 Nov 2019 06:00), Max: 37 (02 Nov 2019 15:55)  T(F): 98 (03 Nov 2019 06:00), Max: 98.6 (02 Nov 2019 15:55)  HR: 93 (02 Nov 2019 20:52) (93 - 93)  BP: 130/50 (02 Nov 2019 20:52) (130/50 - 130/50)  BP(mean): --  RR: 18 (03 Nov 2019 06:00) (18 - 18)  SpO2: --  CAPILLARY BLOOD GLUCOSE      POCT Blood Glucose.: 155 mg/dL (03 Nov 2019 08:08)  POCT Blood Glucose.: 110 mg/dL (02 Nov 2019 21:14)  POCT Blood Glucose.: 71 mg/dL (02 Nov 2019 16:43)  POCT Blood Glucose.: 56 mg/dL (02 Nov 2019 16:22)        PHYSICAL EXAM:  GENERAL: NAD, well-developed  EYES: EOMI, conjunctiva and sclera clear  CHEST/LUNG: Clear to auscultation bilaterally; No wheeze  HEART: Regular rate and rhythm; No murmurs, rubs, or gallops  ABDOMEN: obese abdomen, Soft, Nontender, Nondistended; Bowel sounds present  EXTREMITIES:  2+ Peripheral Pulses, No clubbing, cyanosis, or edema    LABS:                    RADIOLOGY & ADDITIONAL TESTS:    Imaging Personally Reviewed:    Consultant(s) Notes Reviewed:      Care Discussed with Consultants/Other Providers:

## 2019-11-04 LAB
GLUCOSE BLDC GLUCOMTR-MCNC: 107 MG/DL — HIGH (ref 70–99)
GLUCOSE BLDC GLUCOMTR-MCNC: 109 MG/DL — HIGH (ref 70–99)
VALPROATE SERPL-MCNC: 20.2 UG/ML — LOW (ref 50–100)

## 2019-11-04 PROCEDURE — 99232 SBSQ HOSP IP/OBS MODERATE 35: CPT

## 2019-11-04 RX ORDER — LANOLIN ALCOHOL/MO/W.PET/CERES
3 CREAM (GRAM) TOPICAL AT BEDTIME
Refills: 0 | Status: DISCONTINUED | OUTPATIENT
Start: 2019-11-04 | End: 2019-11-15

## 2019-11-04 RX ADMIN — METFORMIN HYDROCHLORIDE 1000 MILLIGRAM(S): 850 TABLET ORAL at 21:34

## 2019-11-04 RX ADMIN — Medication 25 MILLIGRAM(S): at 13:07

## 2019-11-04 RX ADMIN — Medication 1 DROP(S): at 08:40

## 2019-11-04 RX ADMIN — AMLODIPINE BESYLATE 5 MILLIGRAM(S): 2.5 TABLET ORAL at 08:40

## 2019-11-04 RX ADMIN — Medication 1 APPLICATION(S): at 08:52

## 2019-11-04 RX ADMIN — GABAPENTIN 1000 MILLIGRAM(S): 400 CAPSULE ORAL at 21:34

## 2019-11-04 RX ADMIN — HALOPERIDOL DECANOATE 2 MILLIGRAM(S): 100 INJECTION INTRAMUSCULAR at 13:07

## 2019-11-04 RX ADMIN — METFORMIN HYDROCHLORIDE 1000 MILLIGRAM(S): 850 TABLET ORAL at 08:41

## 2019-11-04 RX ADMIN — Medication 1 DROP(S): at 21:34

## 2019-11-04 RX ADMIN — CLOPIDOGREL BISULFATE 75 MILLIGRAM(S): 75 TABLET, FILM COATED ORAL at 08:40

## 2019-11-04 RX ADMIN — Medication 25 MILLIGRAM(S): at 21:34

## 2019-11-04 RX ADMIN — Medication 100 MILLIGRAM(S): at 08:41

## 2019-11-04 RX ADMIN — GABAPENTIN 1000 MILLIGRAM(S): 400 CAPSULE ORAL at 13:07

## 2019-11-04 RX ADMIN — Medication 25 MILLIGRAM(S): at 08:39

## 2019-11-04 RX ADMIN — DIVALPROEX SODIUM 250 MILLIGRAM(S): 500 TABLET, DELAYED RELEASE ORAL at 21:34

## 2019-11-04 RX ADMIN — ATORVASTATIN CALCIUM 80 MILLIGRAM(S): 80 TABLET, FILM COATED ORAL at 21:34

## 2019-11-04 RX ADMIN — HALOPERIDOL DECANOATE 2 MILLIGRAM(S): 100 INJECTION INTRAMUSCULAR at 21:34

## 2019-11-04 RX ADMIN — Medication 81 MILLIGRAM(S): at 08:40

## 2019-11-04 RX ADMIN — Medication 1 MILLIGRAM(S): at 13:07

## 2019-11-04 RX ADMIN — Medication 3 MILLIGRAM(S): at 22:41

## 2019-11-04 RX ADMIN — Medication 650 MILLIGRAM(S): at 22:40

## 2019-11-04 RX ADMIN — Medication 100 MILLIGRAM(S): at 08:40

## 2019-11-04 RX ADMIN — Medication 1 MILLIGRAM(S): at 06:17

## 2019-11-04 RX ADMIN — Medication 1 MILLIGRAM(S): at 21:34

## 2019-11-04 RX ADMIN — Medication 100 MILLIGRAM(S): at 21:34

## 2019-11-04 RX ADMIN — Medication 650 MILLIGRAM(S): at 23:30

## 2019-11-04 RX ADMIN — Medication 2 MILLIGRAM(S): at 09:08

## 2019-11-04 RX ADMIN — Medication 1 DROP(S): at 13:07

## 2019-11-04 RX ADMIN — HALOPERIDOL DECANOATE 2 MILLIGRAM(S): 100 INJECTION INTRAMUSCULAR at 14:40

## 2019-11-04 RX ADMIN — LATANOPROST 1 DROP(S): 0.05 SOLUTION/ DROPS OPHTHALMIC; TOPICAL at 21:00

## 2019-11-04 RX ADMIN — GABAPENTIN 1000 MILLIGRAM(S): 400 CAPSULE ORAL at 06:17

## 2019-11-04 RX ADMIN — HALOPERIDOL DECANOATE 2 MILLIGRAM(S): 100 INJECTION INTRAMUSCULAR at 06:17

## 2019-11-05 LAB
GLUCOSE BLDC GLUCOMTR-MCNC: 102 MG/DL — HIGH (ref 70–99)
GLUCOSE BLDC GLUCOMTR-MCNC: 103 MG/DL — HIGH (ref 70–99)
GLUCOSE BLDC GLUCOMTR-MCNC: 110 MG/DL — HIGH (ref 70–99)

## 2019-11-05 PROCEDURE — 99232 SBSQ HOSP IP/OBS MODERATE 35: CPT

## 2019-11-05 RX ORDER — DIVALPROEX SODIUM 500 MG/1
500 TABLET, DELAYED RELEASE ORAL
Refills: 0 | Status: DISCONTINUED | OUTPATIENT
Start: 2019-11-05 | End: 2019-11-15

## 2019-11-05 RX ADMIN — HALOPERIDOL DECANOATE 2 MILLIGRAM(S): 100 INJECTION INTRAMUSCULAR at 20:00

## 2019-11-05 RX ADMIN — HALOPERIDOL DECANOATE 2 MILLIGRAM(S): 100 INJECTION INTRAMUSCULAR at 12:36

## 2019-11-05 RX ADMIN — HALOPERIDOL DECANOATE 2 MILLIGRAM(S): 100 INJECTION INTRAMUSCULAR at 09:05

## 2019-11-05 RX ADMIN — Medication 100 MILLIGRAM(S): at 09:04

## 2019-11-05 RX ADMIN — GABAPENTIN 1000 MILLIGRAM(S): 400 CAPSULE ORAL at 06:24

## 2019-11-05 RX ADMIN — Medication 81 MILLIGRAM(S): at 09:00

## 2019-11-05 RX ADMIN — Medication 25 MILLIGRAM(S): at 09:04

## 2019-11-05 RX ADMIN — Medication 100 MILLIGRAM(S): at 08:58

## 2019-11-05 RX ADMIN — AMLODIPINE BESYLATE 5 MILLIGRAM(S): 2.5 TABLET ORAL at 08:58

## 2019-11-05 RX ADMIN — Medication 1 DROP(S): at 13:11

## 2019-11-05 RX ADMIN — METFORMIN HYDROCHLORIDE 1000 MILLIGRAM(S): 850 TABLET ORAL at 20:00

## 2019-11-05 RX ADMIN — Medication 1 DROP(S): at 08:58

## 2019-11-05 RX ADMIN — DIVALPROEX SODIUM 500 MILLIGRAM(S): 500 TABLET, DELAYED RELEASE ORAL at 20:00

## 2019-11-05 RX ADMIN — CLOPIDOGREL BISULFATE 75 MILLIGRAM(S): 75 TABLET, FILM COATED ORAL at 09:00

## 2019-11-05 RX ADMIN — Medication 1 DROP(S): at 20:00

## 2019-11-05 RX ADMIN — Medication 1 MILLIGRAM(S): at 20:00

## 2019-11-05 RX ADMIN — DIVALPROEX SODIUM 250 MILLIGRAM(S): 500 TABLET, DELAYED RELEASE ORAL at 09:00

## 2019-11-05 RX ADMIN — Medication 25 MILLIGRAM(S): at 20:00

## 2019-11-05 RX ADMIN — GABAPENTIN 1000 MILLIGRAM(S): 400 CAPSULE ORAL at 20:00

## 2019-11-05 RX ADMIN — HALOPERIDOL DECANOATE 2 MILLIGRAM(S): 100 INJECTION INTRAMUSCULAR at 06:24

## 2019-11-05 RX ADMIN — METFORMIN HYDROCHLORIDE 1000 MILLIGRAM(S): 850 TABLET ORAL at 09:04

## 2019-11-05 RX ADMIN — Medication 100 MILLIGRAM(S): at 20:00

## 2019-11-05 RX ADMIN — Medication 1 MILLIGRAM(S): at 12:38

## 2019-11-05 RX ADMIN — ATORVASTATIN CALCIUM 80 MILLIGRAM(S): 80 TABLET, FILM COATED ORAL at 20:00

## 2019-11-05 RX ADMIN — Medication 1 MILLIGRAM(S): at 06:24

## 2019-11-05 RX ADMIN — Medication 3 MILLIGRAM(S): at 20:00

## 2019-11-05 RX ADMIN — Medication 25 MILLIGRAM(S): at 12:38

## 2019-11-05 RX ADMIN — LATANOPROST 1 DROP(S): 0.05 SOLUTION/ DROPS OPHTHALMIC; TOPICAL at 20:00

## 2019-11-05 RX ADMIN — Medication 1 APPLICATION(S): at 20:19

## 2019-11-05 RX ADMIN — Medication 1 APPLICATION(S): at 09:02

## 2019-11-05 RX ADMIN — GABAPENTIN 1000 MILLIGRAM(S): 400 CAPSULE ORAL at 12:36

## 2019-11-06 LAB
GLUCOSE BLDC GLUCOMTR-MCNC: 104 MG/DL — HIGH (ref 70–99)
GLUCOSE BLDC GLUCOMTR-MCNC: 61 MG/DL — LOW (ref 70–99)
GLUCOSE BLDC GLUCOMTR-MCNC: 91 MG/DL — SIGNIFICANT CHANGE UP (ref 70–99)

## 2019-11-06 PROCEDURE — 99232 SBSQ HOSP IP/OBS MODERATE 35: CPT

## 2019-11-06 RX ORDER — HALOPERIDOL DECANOATE 100 MG/ML
2 INJECTION INTRAMUSCULAR ONCE
Refills: 0 | Status: COMPLETED | OUTPATIENT
Start: 2019-11-06 | End: 2019-11-06

## 2019-11-06 RX ADMIN — DIVALPROEX SODIUM 500 MILLIGRAM(S): 500 TABLET, DELAYED RELEASE ORAL at 08:44

## 2019-11-06 RX ADMIN — HALOPERIDOL DECANOATE 2 MILLIGRAM(S): 100 INJECTION INTRAMUSCULAR at 10:00

## 2019-11-06 RX ADMIN — Medication 100 MILLIGRAM(S): at 20:01

## 2019-11-06 RX ADMIN — GABAPENTIN 1000 MILLIGRAM(S): 400 CAPSULE ORAL at 13:19

## 2019-11-06 RX ADMIN — GABAPENTIN 1000 MILLIGRAM(S): 400 CAPSULE ORAL at 06:33

## 2019-11-06 RX ADMIN — Medication 1 DROP(S): at 20:14

## 2019-11-06 RX ADMIN — Medication 100 MILLIGRAM(S): at 08:44

## 2019-11-06 RX ADMIN — Medication 81 MILLIGRAM(S): at 08:44

## 2019-11-06 RX ADMIN — METFORMIN HYDROCHLORIDE 1000 MILLIGRAM(S): 850 TABLET ORAL at 08:44

## 2019-11-06 RX ADMIN — HALOPERIDOL DECANOATE 2 MILLIGRAM(S): 100 INJECTION INTRAMUSCULAR at 06:33

## 2019-11-06 RX ADMIN — AMLODIPINE BESYLATE 5 MILLIGRAM(S): 2.5 TABLET ORAL at 08:44

## 2019-11-06 RX ADMIN — CLOPIDOGREL BISULFATE 75 MILLIGRAM(S): 75 TABLET, FILM COATED ORAL at 08:44

## 2019-11-06 RX ADMIN — HALOPERIDOL DECANOATE 2 MILLIGRAM(S): 100 INJECTION INTRAMUSCULAR at 13:19

## 2019-11-06 RX ADMIN — GABAPENTIN 1000 MILLIGRAM(S): 400 CAPSULE ORAL at 20:01

## 2019-11-06 RX ADMIN — Medication 1 MILLIGRAM(S): at 10:00

## 2019-11-06 RX ADMIN — ATORVASTATIN CALCIUM 80 MILLIGRAM(S): 80 TABLET, FILM COATED ORAL at 20:01

## 2019-11-06 RX ADMIN — Medication 1 MILLIGRAM(S): at 06:34

## 2019-11-06 RX ADMIN — HALOPERIDOL DECANOATE 2 MILLIGRAM(S): 100 INJECTION INTRAMUSCULAR at 20:01

## 2019-11-06 RX ADMIN — LATANOPROST 1 DROP(S): 0.05 SOLUTION/ DROPS OPHTHALMIC; TOPICAL at 20:15

## 2019-11-06 RX ADMIN — METFORMIN HYDROCHLORIDE 1000 MILLIGRAM(S): 850 TABLET ORAL at 20:01

## 2019-11-06 RX ADMIN — Medication 25 MILLIGRAM(S): at 20:01

## 2019-11-06 RX ADMIN — Medication 25 MILLIGRAM(S): at 08:44

## 2019-11-06 RX ADMIN — Medication 3 MILLIGRAM(S): at 20:01

## 2019-11-06 RX ADMIN — Medication 1 MILLIGRAM(S): at 13:19

## 2019-11-06 RX ADMIN — Medication 1 DROP(S): at 08:44

## 2019-11-06 RX ADMIN — Medication 1 MILLIGRAM(S): at 20:01

## 2019-11-06 RX ADMIN — DIVALPROEX SODIUM 500 MILLIGRAM(S): 500 TABLET, DELAYED RELEASE ORAL at 20:01

## 2019-11-06 RX ADMIN — Medication 25 MILLIGRAM(S): at 13:19

## 2019-11-07 LAB
GLUCOSE BLDC GLUCOMTR-MCNC: 105 MG/DL — HIGH (ref 70–99)
GLUCOSE BLDC GLUCOMTR-MCNC: 77 MG/DL — SIGNIFICANT CHANGE UP (ref 70–99)
GLUCOSE BLDC GLUCOMTR-MCNC: 80 MG/DL — SIGNIFICANT CHANGE UP (ref 70–99)

## 2019-11-07 PROCEDURE — 99233 SBSQ HOSP IP/OBS HIGH 50: CPT

## 2019-11-07 PROCEDURE — 93010 ELECTROCARDIOGRAM REPORT: CPT

## 2019-11-07 PROCEDURE — 99232 SBSQ HOSP IP/OBS MODERATE 35: CPT

## 2019-11-07 RX ADMIN — Medication 3 MILLIGRAM(S): at 20:52

## 2019-11-07 RX ADMIN — Medication 1 DROP(S): at 20:54

## 2019-11-07 RX ADMIN — HALOPERIDOL DECANOATE 2 MILLIGRAM(S): 100 INJECTION INTRAMUSCULAR at 06:19

## 2019-11-07 RX ADMIN — Medication 1 MILLIGRAM(S): at 12:46

## 2019-11-07 RX ADMIN — Medication 1 MILLIGRAM(S): at 06:19

## 2019-11-07 RX ADMIN — GABAPENTIN 1000 MILLIGRAM(S): 400 CAPSULE ORAL at 12:46

## 2019-11-07 RX ADMIN — Medication 100 MILLIGRAM(S): at 20:51

## 2019-11-07 RX ADMIN — DIVALPROEX SODIUM 500 MILLIGRAM(S): 500 TABLET, DELAYED RELEASE ORAL at 08:44

## 2019-11-07 RX ADMIN — METFORMIN HYDROCHLORIDE 1000 MILLIGRAM(S): 850 TABLET ORAL at 20:52

## 2019-11-07 RX ADMIN — Medication 100 MILLIGRAM(S): at 08:44

## 2019-11-07 RX ADMIN — Medication 81 MILLIGRAM(S): at 08:44

## 2019-11-07 RX ADMIN — GABAPENTIN 1000 MILLIGRAM(S): 400 CAPSULE ORAL at 06:19

## 2019-11-07 RX ADMIN — ATORVASTATIN CALCIUM 80 MILLIGRAM(S): 80 TABLET, FILM COATED ORAL at 20:51

## 2019-11-07 RX ADMIN — LATANOPROST 1 DROP(S): 0.05 SOLUTION/ DROPS OPHTHALMIC; TOPICAL at 20:54

## 2019-11-07 RX ADMIN — CLOPIDOGREL BISULFATE 75 MILLIGRAM(S): 75 TABLET, FILM COATED ORAL at 08:44

## 2019-11-07 RX ADMIN — HALOPERIDOL DECANOATE 2 MILLIGRAM(S): 100 INJECTION INTRAMUSCULAR at 17:29

## 2019-11-07 RX ADMIN — HALOPERIDOL DECANOATE 2 MILLIGRAM(S): 100 INJECTION INTRAMUSCULAR at 20:52

## 2019-11-07 RX ADMIN — Medication 25 MILLIGRAM(S): at 12:46

## 2019-11-07 RX ADMIN — Medication 1 MILLIGRAM(S): at 20:52

## 2019-11-07 RX ADMIN — HALOPERIDOL DECANOATE 2 MILLIGRAM(S): 100 INJECTION INTRAMUSCULAR at 12:46

## 2019-11-07 RX ADMIN — METFORMIN HYDROCHLORIDE 1000 MILLIGRAM(S): 850 TABLET ORAL at 08:45

## 2019-11-07 RX ADMIN — Medication 25 MILLIGRAM(S): at 20:52

## 2019-11-07 RX ADMIN — AMLODIPINE BESYLATE 5 MILLIGRAM(S): 2.5 TABLET ORAL at 08:44

## 2019-11-07 RX ADMIN — Medication 100 MILLIGRAM(S): at 08:45

## 2019-11-07 RX ADMIN — GABAPENTIN 1000 MILLIGRAM(S): 400 CAPSULE ORAL at 20:52

## 2019-11-07 RX ADMIN — Medication 25 MILLIGRAM(S): at 08:45

## 2019-11-07 RX ADMIN — DIVALPROEX SODIUM 500 MILLIGRAM(S): 500 TABLET, DELAYED RELEASE ORAL at 20:51

## 2019-11-07 NOTE — PROGRESS NOTE ADULT - SUBJECTIVE AND OBJECTIVE BOX
CC/Reason for Consult: chest pain    SUBJECTIVE / OVERNIGHT EVENTS:  Patient had report of chest pain last night and again today.  Was seen by covering doctor yesterday, atypical pain in chest, no changes on EKG.  Today pt states that he has had dull nearly constant chest pain since his cardiac stents were placed, sometimes it goes away.     MEDICATIONS  (STANDING):  amantadine 100 milliGRAM(s) Oral two times a day  amLODIPine   Tablet 5 milliGRAM(s) Oral daily  artificial  tears Solution 1 Drop(s) Both EYES three times a day  aspirin enteric coated 81 milliGRAM(s) Oral daily  atorvastatin 80 milliGRAM(s) Oral at bedtime  clopidogrel Tablet 75 milliGRAM(s) Oral daily  dextrose 5%. 1000 milliLiter(s) (50 mL/Hr) IV Continuous <Continuous>  dextrose 50% Injectable 12.5 Gram(s) IV Push once  dextrose 50% Injectable 25 Gram(s) IV Push once  dextrose 50% Injectable 25 Gram(s) IV Push once  diVALproex  milliGRAM(s) Oral two times a day  gabapentin 1000 milliGRAM(s) Oral <User Schedule>  haloperidol     Tablet 2 milliGRAM(s) Oral <User Schedule>  insulin lispro (HumaLOG) corrective regimen sliding scale   SubCutaneous three times a day before meals  insulin lispro (HumaLOG) corrective regimen sliding scale   SubCutaneous at bedtime  latanoprost 0.005% Ophthalmic Solution 1 Drop(s) Both EYES at bedtime  LORazepam     Tablet 1 milliGRAM(s) Oral <User Schedule>  meclizine 25 milliGRAM(s) Oral three times a day  melatonin. 3 milliGRAM(s) Oral at bedtime  metFORMIN 1000 milliGRAM(s) Oral two times a day  metoprolol succinate  milliGRAM(s) Oral daily  sitaGLIPtin 100 milliGRAM(s) Oral daily    MEDICATIONS  (PRN):  acetaminophen   Tablet .. 650 milliGRAM(s) Oral every 6 hours PRN Mild Pain (1 - 3)  ALBUTerol    90 MICROgram(s) HFA Inhaler 2 Puff(s) Inhalation every 6 hours PRN wheezing  dextrose 40% Gel 15 Gram(s) Oral once PRN Blood Glucose LESS THAN 70 milliGRAM(s)/deciliter  diphenhydrAMINE 25 milliGRAM(s) Oral every 6 hours PRN Rash and/or Itching  glucagon  Injectable 1 milliGRAM(s) IntraMuscular once PRN Glucose LESS THAN 70 milligrams/deciliter  haloperidol     Tablet 2 milliGRAM(s) Oral every 6 hours PRN agitation  haloperidol    Injectable 2 milliGRAM(s) IntraMuscular once PRN severe agitation  LORazepam   Injectable 1 milliGRAM(s) IntraMuscular once PRN Agitation      Vital Signs Last 24 Hrs  T(C): 36.7 (07 Nov 2019 15:54), Max: 36.7 (07 Nov 2019 08:42)  T(F): 98 (07 Nov 2019 15:54), Max: 98 (07 Nov 2019 08:42)  HR: 86  BP: 134/68  BP(mean): --  RR: 15  SpO2: --  CAPILLARY BLOOD GLUCOSE      POCT Blood Glucose.: 77 mg/dL (07 Nov 2019 12:25)  POCT Blood Glucose.: 105 mg/dL (07 Nov 2019 08:11)        PHYSICAL EXAM:  GENERAL: NAD, well-developed  HEAD:  Atraumatic, Normocephalic  EYES: EOMI, conjunctiva and sclera clear  NECK: Supple, No JVD  CHEST/LUNG: Clear to auscultation bilaterally; No wheeze Palpation of left chest wall reproduces pain  HEART: Regular rate and rhythm; No murmurs, rubs, or gallops  ABDOMEN: Soft, Nontender, Nondistended; Bowel sounds present  EXTREMITIES:  2+ Peripheral Pulses, No clubbing, cyanosis, or edema  PSYCH: AAOx3  NEUROLOGY: non-focal  SKIN: No rashes or lesions    LABS:    Comprehensive Metabolic Panel (11.03.19 @ 11:26)    Sodium, Serum: 140 mmol/L    Potassium, Serum: 4.1 mmol/L    Chloride, Serum: 101 mmol/L    Carbon Dioxide, Serum: 25 mmol/L    Anion Gap, Serum: 14 mmo/L    Blood Urea Nitrogen, Serum: 8 mg/dL    Creatinine, Serum: 0.66 mg/dL    Glucose, Serum: 149 mg/dL    Calcium, Total Serum: 9.1 mg/dL    Protein Total, Serum: 6.6 g/dL    Albumin, Serum: 3.7 g/dL    Bilirubin Total, Serum: 0.5 mg/dL    Alkaline Phosphatase, Serum: 91 u/L    Aspartate Aminotransferase (AST/SGOT): 10 u/L    Alanine Aminotransferase (ALT/SGPT): 8 u/L    eGFR if Non : 97mL/min    eGFR if : 112 mL/min            EKG: NSR at 87 with nonspecific T wave changes, no changes vs prior EKGs.      Care Discussed with Consultants/Other Providers: Dr Eubanks

## 2019-11-07 NOTE — PROGRESS NOTE ADULT - ASSESSMENT
74M on with CLL, T2DM previously on stable home oral hyperglycemic regimen now have recurrent hypoglycemia. episodes tend to occur in the PM. DE SOUAZ likely contributing to the hypoglycemia episodes in setting of decreased carb intake when compared to home.     #Chest pain: Atypical with reproducibility with palpation.  No suspicion of cardiac source of pain. Likely musculoskeletal. Tylenol prn    #Type 2 DM with Hypoglycemic episodes  -resolved with d/c sulfonylurea, would not resume.  Continue metformin, januvia    #psychosis: Management per primary team

## 2019-11-08 LAB
GLUCOSE BLDC GLUCOMTR-MCNC: 124 MG/DL — HIGH (ref 70–99)
GLUCOSE BLDC GLUCOMTR-MCNC: 99 MG/DL — SIGNIFICANT CHANGE UP (ref 70–99)

## 2019-11-08 PROCEDURE — 99232 SBSQ HOSP IP/OBS MODERATE 35: CPT

## 2019-11-08 RX ORDER — HALOPERIDOL DECANOATE 100 MG/ML
2 INJECTION INTRAMUSCULAR ONCE
Refills: 0 | Status: COMPLETED | OUTPATIENT
Start: 2019-11-08 | End: 2019-11-08

## 2019-11-08 RX ORDER — HALOPERIDOL DECANOATE 100 MG/ML
3 INJECTION INTRAMUSCULAR
Refills: 0 | Status: DISCONTINUED | OUTPATIENT
Start: 2019-11-08 | End: 2019-11-09

## 2019-11-08 RX ADMIN — Medication 1 MILLIGRAM(S): at 08:04

## 2019-11-08 RX ADMIN — Medication 1 MILLIGRAM(S): at 21:08

## 2019-11-08 RX ADMIN — Medication 1 DROP(S): at 14:58

## 2019-11-08 RX ADMIN — Medication 25 MILLIGRAM(S): at 11:28

## 2019-11-08 RX ADMIN — Medication 100 MILLIGRAM(S): at 11:28

## 2019-11-08 RX ADMIN — Medication 1 MILLIGRAM(S): at 06:02

## 2019-11-08 RX ADMIN — Medication 25 MILLIGRAM(S): at 14:59

## 2019-11-08 RX ADMIN — DIVALPROEX SODIUM 500 MILLIGRAM(S): 500 TABLET, DELAYED RELEASE ORAL at 11:28

## 2019-11-08 RX ADMIN — ATORVASTATIN CALCIUM 80 MILLIGRAM(S): 80 TABLET, FILM COATED ORAL at 21:07

## 2019-11-08 RX ADMIN — CLOPIDOGREL BISULFATE 75 MILLIGRAM(S): 75 TABLET, FILM COATED ORAL at 11:28

## 2019-11-08 RX ADMIN — Medication 1 MILLIGRAM(S): at 14:59

## 2019-11-08 RX ADMIN — Medication 81 MILLIGRAM(S): at 11:28

## 2019-11-08 RX ADMIN — HALOPERIDOL DECANOATE 2 MILLIGRAM(S): 100 INJECTION INTRAMUSCULAR at 06:02

## 2019-11-08 RX ADMIN — GABAPENTIN 1000 MILLIGRAM(S): 400 CAPSULE ORAL at 21:07

## 2019-11-08 RX ADMIN — HALOPERIDOL DECANOATE 3 MILLIGRAM(S): 100 INJECTION INTRAMUSCULAR at 21:07

## 2019-11-08 RX ADMIN — METFORMIN HYDROCHLORIDE 1000 MILLIGRAM(S): 850 TABLET ORAL at 21:08

## 2019-11-08 RX ADMIN — Medication 25 MILLIGRAM(S): at 21:08

## 2019-11-08 RX ADMIN — AMLODIPINE BESYLATE 5 MILLIGRAM(S): 2.5 TABLET ORAL at 11:28

## 2019-11-08 RX ADMIN — HALOPERIDOL DECANOATE 3 MILLIGRAM(S): 100 INJECTION INTRAMUSCULAR at 14:59

## 2019-11-08 RX ADMIN — HALOPERIDOL DECANOATE 2 MILLIGRAM(S): 100 INJECTION INTRAMUSCULAR at 08:04

## 2019-11-08 RX ADMIN — Medication 1 DROP(S): at 21:06

## 2019-11-08 RX ADMIN — Medication 100 MILLIGRAM(S): at 21:06

## 2019-11-08 RX ADMIN — LATANOPROST 1 DROP(S): 0.05 SOLUTION/ DROPS OPHTHALMIC; TOPICAL at 21:07

## 2019-11-08 RX ADMIN — Medication 3 MILLIGRAM(S): at 21:08

## 2019-11-08 RX ADMIN — GABAPENTIN 1000 MILLIGRAM(S): 400 CAPSULE ORAL at 06:02

## 2019-11-08 RX ADMIN — DIVALPROEX SODIUM 500 MILLIGRAM(S): 500 TABLET, DELAYED RELEASE ORAL at 21:07

## 2019-11-08 RX ADMIN — GABAPENTIN 1000 MILLIGRAM(S): 400 CAPSULE ORAL at 14:59

## 2019-11-08 NOTE — PROGRESS NOTE ADULT - ASSESSMENT
74M on with CLL, T2DM CAD HTN with itching    #Itching: Macules on back consistent with some chronic skin changes where shingles had occurred.  Stronger steroid ointment ordered    #Type 2 DM with Hypoglycemic episodes  -resolved with d/c sulfonylurea, would not resume.  Continue metformin, januvia    #psychosis: Management per primary team

## 2019-11-08 NOTE — PROGRESS NOTE ADULT - SUBJECTIVE AND OBJECTIVE BOX
CC/Reason for Consult: rash    SUBJECTIVE / OVERNIGHT EVENTS:  Pt reports that several months ago he had shingles on hi sback and that he has had itching in that location since as well as a rash.  Has tried low-potency steroid creams that have not helped the itching.    MEDICATIONS  (STANDING):  amantadine 100 milliGRAM(s) Oral two times a day  amLODIPine   Tablet 5 milliGRAM(s) Oral daily  artificial  tears Solution 1 Drop(s) Both EYES three times a day  aspirin enteric coated 81 milliGRAM(s) Oral daily  atorvastatin 80 milliGRAM(s) Oral at bedtime  clopidogrel Tablet 75 milliGRAM(s) Oral daily  dextrose 5%. 1000 milliLiter(s) (50 mL/Hr) IV Continuous <Continuous>  dextrose 50% Injectable 12.5 Gram(s) IV Push once  dextrose 50% Injectable 25 Gram(s) IV Push once  dextrose 50% Injectable 25 Gram(s) IV Push once  diVALproex  milliGRAM(s) Oral two times a day  gabapentin 1000 milliGRAM(s) Oral <User Schedule>  haloperidol     Tablet 3 milliGRAM(s) Oral <User Schedule>  insulin lispro (HumaLOG) corrective regimen sliding scale   SubCutaneous three times a day before meals  insulin lispro (HumaLOG) corrective regimen sliding scale   SubCutaneous at bedtime  latanoprost 0.005% Ophthalmic Solution 1 Drop(s) Both EYES at bedtime  LORazepam     Tablet 1 milliGRAM(s) Oral <User Schedule>  meclizine 25 milliGRAM(s) Oral three times a day  melatonin. 3 milliGRAM(s) Oral at bedtime  metFORMIN 1000 milliGRAM(s) Oral two times a day  metoprolol succinate  milliGRAM(s) Oral daily  sitaGLIPtin 100 milliGRAM(s) Oral daily  triamcinolone 0.1% Ointment 1 Application(s) Topical <User Schedule>    MEDICATIONS  (PRN):  acetaminophen   Tablet .. 650 milliGRAM(s) Oral every 6 hours PRN Mild Pain (1 - 3)  ALBUTerol    90 MICROgram(s) HFA Inhaler 2 Puff(s) Inhalation every 6 hours PRN wheezing  dextrose 40% Gel 15 Gram(s) Oral once PRN Blood Glucose LESS THAN 70 milliGRAM(s)/deciliter  diphenhydrAMINE 25 milliGRAM(s) Oral every 6 hours PRN Rash and/or Itching  glucagon  Injectable 1 milliGRAM(s) IntraMuscular once PRN Glucose LESS THAN 70 milligrams/deciliter  haloperidol     Tablet 2 milliGRAM(s) Oral every 6 hours PRN agitation  haloperidol    Injectable 2 milliGRAM(s) IntraMuscular once PRN severe agitation  LORazepam   Injectable 1 milliGRAM(s) IntraMuscular once PRN Agitation      Vital Signs Last 24 Hrs  T(C): 36.4 (08 Nov 2019 06:14), Max: 36.7 (07 Nov 2019 15:54)  T(F): 97.6 (08 Nov 2019 06:14), Max: 98 (07 Nov 2019 15:54)  HR: 80  BP: 140/64  BP(mean): --  RR: 17  SpO2: --  CAPILLARY BLOOD GLUCOSE      POCT Blood Glucose.: 99 mg/dL (08 Nov 2019 08:12)  POCT Blood Glucose.: 80 mg/dL (07 Nov 2019 16:46)        PHYSICAL EXAM:  GENERAL: NAD, well-developed  HEAD:  Atraumatic, Normocephalic  EYES: EOMI, conjunctiva and sclera clear  NECK: Supple, No JVD  CHEST/LUNG: Clear to auscultation bilaterally; No wheeze  HEART: Regular rate and rhythm; No murmurs, rubs, or gallops  ABDOMEN: Soft, Nontender, Nondistended; Bowel sounds present  EXTREMITIES:  2+ Peripheral Pulses, No clubbing, cyanosis, or edema  PSYCH: AAOx3  NEUROLOGY: non-focal  SKIN: faint macules scattered over left upper and mid back where pt localizes the itching.

## 2019-11-09 LAB
GLUCOSE BLDC GLUCOMTR-MCNC: 112 MG/DL — HIGH (ref 70–99)
GLUCOSE BLDC GLUCOMTR-MCNC: 115 MG/DL — HIGH (ref 70–99)
GLUCOSE BLDC GLUCOMTR-MCNC: 92 MG/DL — SIGNIFICANT CHANGE UP (ref 70–99)
VALPROATE SERPL-MCNC: 36.1 UG/ML — LOW (ref 50–100)

## 2019-11-09 PROCEDURE — 99232 SBSQ HOSP IP/OBS MODERATE 35: CPT

## 2019-11-09 RX ORDER — ACETAMINOPHEN 500 MG
650 TABLET ORAL ONCE
Refills: 0 | Status: COMPLETED | OUTPATIENT
Start: 2019-11-09 | End: 2019-11-09

## 2019-11-09 RX ORDER — HALOPERIDOL DECANOATE 100 MG/ML
2.5 INJECTION INTRAMUSCULAR
Refills: 0 | Status: DISCONTINUED | OUTPATIENT
Start: 2019-11-09 | End: 2019-11-15

## 2019-11-09 RX ADMIN — Medication 1 DROP(S): at 13:02

## 2019-11-09 RX ADMIN — Medication 650 MILLIGRAM(S): at 03:34

## 2019-11-09 RX ADMIN — Medication 1 MILLIGRAM(S): at 21:04

## 2019-11-09 RX ADMIN — Medication 25 MILLIGRAM(S): at 13:02

## 2019-11-09 RX ADMIN — Medication 25 MILLIGRAM(S): at 10:06

## 2019-11-09 RX ADMIN — Medication 3 MILLIGRAM(S): at 21:40

## 2019-11-09 RX ADMIN — Medication 1 DROP(S): at 10:06

## 2019-11-09 RX ADMIN — Medication 25 MILLIGRAM(S): at 21:04

## 2019-11-09 RX ADMIN — HALOPERIDOL DECANOATE 3 MILLIGRAM(S): 100 INJECTION INTRAMUSCULAR at 06:25

## 2019-11-09 RX ADMIN — CLOPIDOGREL BISULFATE 75 MILLIGRAM(S): 75 TABLET, FILM COATED ORAL at 10:06

## 2019-11-09 RX ADMIN — GABAPENTIN 1000 MILLIGRAM(S): 400 CAPSULE ORAL at 06:25

## 2019-11-09 RX ADMIN — Medication 100 MILLIGRAM(S): at 10:06

## 2019-11-09 RX ADMIN — LATANOPROST 1 DROP(S): 0.05 SOLUTION/ DROPS OPHTHALMIC; TOPICAL at 21:04

## 2019-11-09 RX ADMIN — Medication 1 MILLIGRAM(S): at 06:25

## 2019-11-09 RX ADMIN — Medication 1 DROP(S): at 21:07

## 2019-11-09 RX ADMIN — Medication 1 APPLICATION(S): at 10:06

## 2019-11-09 RX ADMIN — HALOPERIDOL DECANOATE 2.5 MILLIGRAM(S): 100 INJECTION INTRAMUSCULAR at 21:04

## 2019-11-09 RX ADMIN — Medication 1 MILLIGRAM(S): at 13:02

## 2019-11-09 RX ADMIN — Medication 81 MILLIGRAM(S): at 10:06

## 2019-11-09 RX ADMIN — AMLODIPINE BESYLATE 5 MILLIGRAM(S): 2.5 TABLET ORAL at 10:06

## 2019-11-09 RX ADMIN — ATORVASTATIN CALCIUM 80 MILLIGRAM(S): 80 TABLET, FILM COATED ORAL at 21:03

## 2019-11-09 RX ADMIN — Medication 100 MILLIGRAM(S): at 21:03

## 2019-11-09 RX ADMIN — GABAPENTIN 1000 MILLIGRAM(S): 400 CAPSULE ORAL at 13:02

## 2019-11-09 RX ADMIN — Medication 650 MILLIGRAM(S): at 02:09

## 2019-11-09 RX ADMIN — DIVALPROEX SODIUM 500 MILLIGRAM(S): 500 TABLET, DELAYED RELEASE ORAL at 21:04

## 2019-11-09 RX ADMIN — METFORMIN HYDROCHLORIDE 1000 MILLIGRAM(S): 850 TABLET ORAL at 21:04

## 2019-11-09 RX ADMIN — GABAPENTIN 1000 MILLIGRAM(S): 400 CAPSULE ORAL at 21:03

## 2019-11-09 RX ADMIN — DIVALPROEX SODIUM 500 MILLIGRAM(S): 500 TABLET, DELAYED RELEASE ORAL at 10:06

## 2019-11-09 RX ADMIN — HALOPERIDOL DECANOATE 3 MILLIGRAM(S): 100 INJECTION INTRAMUSCULAR at 13:02

## 2019-11-09 RX ADMIN — METFORMIN HYDROCHLORIDE 1000 MILLIGRAM(S): 850 TABLET ORAL at 10:06

## 2019-11-09 NOTE — CHART NOTE - NSCHARTNOTEFT_GEN_A_CORE
CC: Dizziness @1:19AM  HPI: 74 year old male with admitting diagnosis of Schizoaffective disorder with PMH of s/p CABG on Plavix 75mg oral daily, Type II DM, HTN, HLD presenting today complaining of nurse. According to nurse, pt was escorted to the bathroom. Pt became dizzy, unsteady on feet leaning forward had bumped nose on wall and was slowly helped to ground by nurse. Pt noted to have superficial abrasion to L knee which he banged when being lowered to floor. Pt denies hitting head, LOC, change in vision, denies any dizziness at this time.      Vitals    BP: 161/67  HR: 90  Unable to obtain orthostatic as pt unsteady on feet.     GENERAL APPEARANCE: Well developed, well nourished, alert and cooperative, and appears to be in no acute distress resting on fritz reclining chair.   HEAD: normocephalic; atraumatic.  NOSE: No nasal discharge. No pain upon palpation.  CARDIAC: Normal S1 and S2. No S3, S4 or murmurs. Rhythm is regular.  LUNGS: Clear to auscultation  LOWER EXTREMITY: L knee notable for superficial abrasion. No pain upon palpation of lateral or medial aspect of L knee. B/L knees normal range of motion, normal sensation with distal capillary filling of less than 2 seconds without tenderness, swelling, discoloration, nodules, weakness or deformity.  NEUROLOGICAL: CN II-XII intact.    Pt AAOx3 in no apparent distress states improvement in dizziness possibly due to orthostatic hypotension. Pt given ice pack and Tylenol for L knee pain. If L knee pain worsens, consider x-ray of L knee. Will continue to monitor pt overnight.

## 2019-11-10 LAB
GLUCOSE BLDC GLUCOMTR-MCNC: 105 MG/DL — HIGH (ref 70–99)
GLUCOSE BLDC GLUCOMTR-MCNC: 148 MG/DL — HIGH (ref 70–99)
GLUCOSE BLDC GLUCOMTR-MCNC: 89 MG/DL — SIGNIFICANT CHANGE UP (ref 70–99)

## 2019-11-10 PROCEDURE — 99232 SBSQ HOSP IP/OBS MODERATE 35: CPT

## 2019-11-10 RX ORDER — ACETAMINOPHEN 500 MG
650 TABLET ORAL ONCE
Refills: 0 | Status: COMPLETED | OUTPATIENT
Start: 2019-11-10 | End: 2019-11-10

## 2019-11-10 RX ADMIN — Medication 1 MILLIGRAM(S): at 20:18

## 2019-11-10 RX ADMIN — ATORVASTATIN CALCIUM 80 MILLIGRAM(S): 80 TABLET, FILM COATED ORAL at 20:18

## 2019-11-10 RX ADMIN — Medication 81 MILLIGRAM(S): at 09:34

## 2019-11-10 RX ADMIN — HALOPERIDOL DECANOATE 2.5 MILLIGRAM(S): 100 INJECTION INTRAMUSCULAR at 13:12

## 2019-11-10 RX ADMIN — Medication 1 MILLIGRAM(S): at 13:12

## 2019-11-10 RX ADMIN — Medication 25 MILLIGRAM(S): at 09:50

## 2019-11-10 RX ADMIN — Medication 1 APPLICATION(S): at 09:50

## 2019-11-10 RX ADMIN — Medication 25 MILLIGRAM(S): at 13:12

## 2019-11-10 RX ADMIN — METFORMIN HYDROCHLORIDE 1000 MILLIGRAM(S): 850 TABLET ORAL at 09:50

## 2019-11-10 RX ADMIN — Medication 3 MILLIGRAM(S): at 20:18

## 2019-11-10 RX ADMIN — DIVALPROEX SODIUM 500 MILLIGRAM(S): 500 TABLET, DELAYED RELEASE ORAL at 09:34

## 2019-11-10 RX ADMIN — LATANOPROST 1 DROP(S): 0.05 SOLUTION/ DROPS OPHTHALMIC; TOPICAL at 20:27

## 2019-11-10 RX ADMIN — Medication 1 MILLIGRAM(S): at 06:11

## 2019-11-10 RX ADMIN — HALOPERIDOL DECANOATE 2.5 MILLIGRAM(S): 100 INJECTION INTRAMUSCULAR at 20:18

## 2019-11-10 RX ADMIN — Medication 1 DROP(S): at 13:12

## 2019-11-10 RX ADMIN — Medication 100 MILLIGRAM(S): at 20:18

## 2019-11-10 RX ADMIN — Medication 100 MILLIGRAM(S): at 09:50

## 2019-11-10 RX ADMIN — METFORMIN HYDROCHLORIDE 1000 MILLIGRAM(S): 850 TABLET ORAL at 20:18

## 2019-11-10 RX ADMIN — DIVALPROEX SODIUM 500 MILLIGRAM(S): 500 TABLET, DELAYED RELEASE ORAL at 20:18

## 2019-11-10 RX ADMIN — Medication 25 MILLIGRAM(S): at 20:18

## 2019-11-10 RX ADMIN — Medication 650 MILLIGRAM(S): at 03:15

## 2019-11-10 RX ADMIN — Medication 1 DROP(S): at 09:34

## 2019-11-10 RX ADMIN — Medication 100 MILLIGRAM(S): at 09:34

## 2019-11-10 RX ADMIN — GABAPENTIN 1000 MILLIGRAM(S): 400 CAPSULE ORAL at 06:11

## 2019-11-10 RX ADMIN — Medication 650 MILLIGRAM(S): at 03:40

## 2019-11-10 RX ADMIN — Medication 1 DROP(S): at 20:18

## 2019-11-10 RX ADMIN — HALOPERIDOL DECANOATE 2.5 MILLIGRAM(S): 100 INJECTION INTRAMUSCULAR at 06:11

## 2019-11-10 RX ADMIN — GABAPENTIN 1000 MILLIGRAM(S): 400 CAPSULE ORAL at 20:18

## 2019-11-10 RX ADMIN — AMLODIPINE BESYLATE 5 MILLIGRAM(S): 2.5 TABLET ORAL at 09:34

## 2019-11-10 RX ADMIN — CLOPIDOGREL BISULFATE 75 MILLIGRAM(S): 75 TABLET, FILM COATED ORAL at 09:34

## 2019-11-10 RX ADMIN — GABAPENTIN 1000 MILLIGRAM(S): 400 CAPSULE ORAL at 13:12

## 2019-11-10 RX ADMIN — HALOPERIDOL DECANOATE 2 MILLIGRAM(S): 100 INJECTION INTRAMUSCULAR at 14:00

## 2019-11-11 LAB
GLUCOSE BLDC GLUCOMTR-MCNC: 106 MG/DL — HIGH (ref 70–99)
GLUCOSE BLDC GLUCOMTR-MCNC: 98 MG/DL — SIGNIFICANT CHANGE UP (ref 70–99)

## 2019-11-11 PROCEDURE — 99232 SBSQ HOSP IP/OBS MODERATE 35: CPT

## 2019-11-11 RX ADMIN — Medication 1 DROP(S): at 21:15

## 2019-11-11 RX ADMIN — GABAPENTIN 1000 MILLIGRAM(S): 400 CAPSULE ORAL at 21:13

## 2019-11-11 RX ADMIN — DIVALPROEX SODIUM 500 MILLIGRAM(S): 500 TABLET, DELAYED RELEASE ORAL at 21:13

## 2019-11-11 RX ADMIN — HALOPERIDOL DECANOATE 2.5 MILLIGRAM(S): 100 INJECTION INTRAMUSCULAR at 12:50

## 2019-11-11 RX ADMIN — Medication 1 DROP(S): at 08:45

## 2019-11-11 RX ADMIN — Medication 81 MILLIGRAM(S): at 08:45

## 2019-11-11 RX ADMIN — Medication 1 MILLIGRAM(S): at 12:50

## 2019-11-11 RX ADMIN — GABAPENTIN 1000 MILLIGRAM(S): 400 CAPSULE ORAL at 12:50

## 2019-11-11 RX ADMIN — Medication 25 MILLIGRAM(S): at 12:50

## 2019-11-11 RX ADMIN — HALOPERIDOL DECANOATE 2.5 MILLIGRAM(S): 100 INJECTION INTRAMUSCULAR at 06:03

## 2019-11-11 RX ADMIN — LATANOPROST 1 DROP(S): 0.05 SOLUTION/ DROPS OPHTHALMIC; TOPICAL at 21:18

## 2019-11-11 RX ADMIN — METFORMIN HYDROCHLORIDE 1000 MILLIGRAM(S): 850 TABLET ORAL at 21:14

## 2019-11-11 RX ADMIN — Medication 25 MILLIGRAM(S): at 21:14

## 2019-11-11 RX ADMIN — GABAPENTIN 1000 MILLIGRAM(S): 400 CAPSULE ORAL at 06:03

## 2019-11-11 RX ADMIN — HALOPERIDOL DECANOATE 2.5 MILLIGRAM(S): 100 INJECTION INTRAMUSCULAR at 21:13

## 2019-11-11 RX ADMIN — CLOPIDOGREL BISULFATE 75 MILLIGRAM(S): 75 TABLET, FILM COATED ORAL at 08:45

## 2019-11-11 RX ADMIN — Medication 3 MILLIGRAM(S): at 21:14

## 2019-11-11 RX ADMIN — Medication 100 MILLIGRAM(S): at 21:14

## 2019-11-11 RX ADMIN — Medication 25 MILLIGRAM(S): at 08:45

## 2019-11-11 RX ADMIN — METFORMIN HYDROCHLORIDE 1000 MILLIGRAM(S): 850 TABLET ORAL at 08:45

## 2019-11-11 RX ADMIN — Medication 1 MILLIGRAM(S): at 06:03

## 2019-11-11 RX ADMIN — Medication 100 MILLIGRAM(S): at 08:45

## 2019-11-11 RX ADMIN — HALOPERIDOL DECANOATE 2 MILLIGRAM(S): 100 INJECTION INTRAMUSCULAR at 18:09

## 2019-11-11 RX ADMIN — ATORVASTATIN CALCIUM 80 MILLIGRAM(S): 80 TABLET, FILM COATED ORAL at 21:13

## 2019-11-11 RX ADMIN — Medication 1 MILLIGRAM(S): at 21:13

## 2019-11-11 RX ADMIN — DIVALPROEX SODIUM 500 MILLIGRAM(S): 500 TABLET, DELAYED RELEASE ORAL at 08:45

## 2019-11-11 RX ADMIN — AMLODIPINE BESYLATE 5 MILLIGRAM(S): 2.5 TABLET ORAL at 08:45

## 2019-11-11 RX ADMIN — Medication 1 APPLICATION(S): at 10:33

## 2019-11-12 LAB
GLUCOSE BLDC GLUCOMTR-MCNC: 115 MG/DL — HIGH (ref 70–99)
GLUCOSE BLDC GLUCOMTR-MCNC: 73 MG/DL — SIGNIFICANT CHANGE UP (ref 70–99)
GLUCOSE BLDC GLUCOMTR-MCNC: 80 MG/DL — SIGNIFICANT CHANGE UP (ref 70–99)

## 2019-11-12 PROCEDURE — 99232 SBSQ HOSP IP/OBS MODERATE 35: CPT

## 2019-11-12 RX ORDER — METFORMIN HYDROCHLORIDE 850 MG/1
1 TABLET ORAL
Qty: 0 | Refills: 0 | DISCHARGE
Start: 2019-11-12

## 2019-11-12 RX ORDER — METOPROLOL TARTRATE 50 MG
1 TABLET ORAL
Qty: 0 | Refills: 0 | DISCHARGE
Start: 2019-11-12

## 2019-11-12 RX ORDER — HALOPERIDOL DECANOATE 100 MG/ML
5 INJECTION INTRAMUSCULAR
Qty: 0 | Refills: 0 | DISCHARGE
Start: 2019-11-12

## 2019-11-12 RX ORDER — DIVALPROEX SODIUM 500 MG/1
1 TABLET, DELAYED RELEASE ORAL
Qty: 0 | Refills: 0 | DISCHARGE
Start: 2019-11-12

## 2019-11-12 RX ORDER — ASPIRIN/CALCIUM CARB/MAGNESIUM 324 MG
1 TABLET ORAL
Qty: 0 | Refills: 0 | DISCHARGE
Start: 2019-11-12

## 2019-11-12 RX ORDER — CLOPIDOGREL BISULFATE 75 MG/1
1 TABLET, FILM COATED ORAL
Qty: 0 | Refills: 0 | DISCHARGE
Start: 2019-11-12

## 2019-11-12 RX ORDER — SITAGLIPTIN 50 MG/1
1 TABLET, FILM COATED ORAL
Qty: 0 | Refills: 0 | DISCHARGE

## 2019-11-12 RX ORDER — LANOLIN ALCOHOL/MO/W.PET/CERES
1 CREAM (GRAM) TOPICAL
Qty: 0 | Refills: 0 | DISCHARGE
Start: 2019-11-12

## 2019-11-12 RX ORDER — ALBUTEROL 90 UG/1
2 AEROSOL, METERED ORAL
Qty: 0 | Refills: 0 | DISCHARGE

## 2019-11-12 RX ORDER — GLIMEPIRIDE 1 MG
1 TABLET ORAL
Qty: 0 | Refills: 0 | DISCHARGE

## 2019-11-12 RX ORDER — SITAGLIPTIN 50 MG/1
1 TABLET, FILM COATED ORAL
Qty: 0 | Refills: 0 | DISCHARGE
Start: 2019-11-12

## 2019-11-12 RX ORDER — ATORVASTATIN CALCIUM 80 MG/1
1 TABLET, FILM COATED ORAL
Qty: 0 | Refills: 0 | DISCHARGE
Start: 2019-11-12

## 2019-11-12 RX ORDER — METFORMIN HYDROCHLORIDE 850 MG/1
1 TABLET ORAL
Qty: 0 | Refills: 0 | DISCHARGE

## 2019-11-12 RX ORDER — MECLIZINE HCL 12.5 MG
1 TABLET ORAL
Qty: 0 | Refills: 0 | DISCHARGE
Start: 2019-11-12

## 2019-11-12 RX ORDER — AMLODIPINE BESYLATE 2.5 MG/1
1 TABLET ORAL
Qty: 0 | Refills: 0 | DISCHARGE
Start: 2019-11-12

## 2019-11-12 RX ORDER — GABAPENTIN 400 MG/1
10 CAPSULE ORAL
Qty: 0 | Refills: 0 | DISCHARGE
Start: 2019-11-12

## 2019-11-12 RX ORDER — AMANTADINE HCL 100 MG
1 CAPSULE ORAL
Qty: 0 | Refills: 0 | DISCHARGE
Start: 2019-11-12

## 2019-11-12 RX ORDER — LATANOPROST 0.05 MG/ML
1 SOLUTION/ DROPS OPHTHALMIC; TOPICAL
Qty: 0 | Refills: 0 | DISCHARGE
Start: 2019-11-12

## 2019-11-12 RX ADMIN — Medication 100 MILLIGRAM(S): at 09:58

## 2019-11-12 RX ADMIN — Medication 1 MILLIGRAM(S): at 13:41

## 2019-11-12 RX ADMIN — METFORMIN HYDROCHLORIDE 1000 MILLIGRAM(S): 850 TABLET ORAL at 20:27

## 2019-11-12 RX ADMIN — DIVALPROEX SODIUM 500 MILLIGRAM(S): 500 TABLET, DELAYED RELEASE ORAL at 20:27

## 2019-11-12 RX ADMIN — Medication 650 MILLIGRAM(S): at 07:45

## 2019-11-12 RX ADMIN — HALOPERIDOL DECANOATE 2.5 MILLIGRAM(S): 100 INJECTION INTRAMUSCULAR at 05:21

## 2019-11-12 RX ADMIN — Medication 3 MILLIGRAM(S): at 20:27

## 2019-11-12 RX ADMIN — GABAPENTIN 1000 MILLIGRAM(S): 400 CAPSULE ORAL at 20:26

## 2019-11-12 RX ADMIN — AMLODIPINE BESYLATE 5 MILLIGRAM(S): 2.5 TABLET ORAL at 09:58

## 2019-11-12 RX ADMIN — Medication 100 MILLIGRAM(S): at 09:59

## 2019-11-12 RX ADMIN — GABAPENTIN 1000 MILLIGRAM(S): 400 CAPSULE ORAL at 05:21

## 2019-11-12 RX ADMIN — Medication 1 MILLIGRAM(S): at 20:27

## 2019-11-12 RX ADMIN — METFORMIN HYDROCHLORIDE 1000 MILLIGRAM(S): 850 TABLET ORAL at 09:59

## 2019-11-12 RX ADMIN — Medication 1 DROP(S): at 09:58

## 2019-11-12 RX ADMIN — Medication 25 MILLIGRAM(S): at 20:27

## 2019-11-12 RX ADMIN — Medication 1 APPLICATION(S): at 09:59

## 2019-11-12 RX ADMIN — Medication 650 MILLIGRAM(S): at 08:10

## 2019-11-12 RX ADMIN — GABAPENTIN 1000 MILLIGRAM(S): 400 CAPSULE ORAL at 13:41

## 2019-11-12 RX ADMIN — Medication 1 DROP(S): at 20:30

## 2019-11-12 RX ADMIN — CLOPIDOGREL BISULFATE 75 MILLIGRAM(S): 75 TABLET, FILM COATED ORAL at 09:59

## 2019-11-12 RX ADMIN — Medication 1 MILLIGRAM(S): at 05:21

## 2019-11-12 RX ADMIN — LATANOPROST 1 DROP(S): 0.05 SOLUTION/ DROPS OPHTHALMIC; TOPICAL at 20:30

## 2019-11-12 RX ADMIN — ATORVASTATIN CALCIUM 80 MILLIGRAM(S): 80 TABLET, FILM COATED ORAL at 20:28

## 2019-11-12 RX ADMIN — HALOPERIDOL DECANOATE 2.5 MILLIGRAM(S): 100 INJECTION INTRAMUSCULAR at 20:27

## 2019-11-12 RX ADMIN — Medication 81 MILLIGRAM(S): at 09:58

## 2019-11-12 RX ADMIN — Medication 100 MILLIGRAM(S): at 20:28

## 2019-11-12 RX ADMIN — Medication 25 MILLIGRAM(S): at 13:41

## 2019-11-12 RX ADMIN — Medication 25 MILLIGRAM(S): at 09:59

## 2019-11-12 RX ADMIN — DIVALPROEX SODIUM 500 MILLIGRAM(S): 500 TABLET, DELAYED RELEASE ORAL at 09:59

## 2019-11-12 RX ADMIN — HALOPERIDOL DECANOATE 2.5 MILLIGRAM(S): 100 INJECTION INTRAMUSCULAR at 13:41

## 2019-11-12 RX ADMIN — Medication 1 DROP(S): at 13:42

## 2019-11-13 LAB
GLUCOSE BLDC GLUCOMTR-MCNC: 111 MG/DL — HIGH (ref 70–99)
GLUCOSE BLDC GLUCOMTR-MCNC: 113 MG/DL — HIGH (ref 70–99)
GLUCOSE BLDC GLUCOMTR-MCNC: 97 MG/DL — SIGNIFICANT CHANGE UP (ref 70–99)

## 2019-11-13 PROCEDURE — 99232 SBSQ HOSP IP/OBS MODERATE 35: CPT

## 2019-11-13 RX ADMIN — GABAPENTIN 1000 MILLIGRAM(S): 400 CAPSULE ORAL at 20:07

## 2019-11-13 RX ADMIN — Medication 1 MILLIGRAM(S): at 20:07

## 2019-11-13 RX ADMIN — GABAPENTIN 1000 MILLIGRAM(S): 400 CAPSULE ORAL at 13:12

## 2019-11-13 RX ADMIN — Medication 3 MILLIGRAM(S): at 20:07

## 2019-11-13 RX ADMIN — AMLODIPINE BESYLATE 5 MILLIGRAM(S): 2.5 TABLET ORAL at 08:44

## 2019-11-13 RX ADMIN — Medication 100 MILLIGRAM(S): at 08:57

## 2019-11-13 RX ADMIN — Medication 1 MILLIGRAM(S): at 13:12

## 2019-11-13 RX ADMIN — METFORMIN HYDROCHLORIDE 1000 MILLIGRAM(S): 850 TABLET ORAL at 08:45

## 2019-11-13 RX ADMIN — HALOPERIDOL DECANOATE 2.5 MILLIGRAM(S): 100 INJECTION INTRAMUSCULAR at 20:07

## 2019-11-13 RX ADMIN — Medication 25 MILLIGRAM(S): at 08:45

## 2019-11-13 RX ADMIN — Medication 25 MILLIGRAM(S): at 20:07

## 2019-11-13 RX ADMIN — METFORMIN HYDROCHLORIDE 1000 MILLIGRAM(S): 850 TABLET ORAL at 20:07

## 2019-11-13 RX ADMIN — Medication 1 DROP(S): at 13:12

## 2019-11-13 RX ADMIN — Medication 1 APPLICATION(S): at 10:32

## 2019-11-13 RX ADMIN — Medication 650 MILLIGRAM(S): at 12:00

## 2019-11-13 RX ADMIN — HALOPERIDOL DECANOATE 2.5 MILLIGRAM(S): 100 INJECTION INTRAMUSCULAR at 05:53

## 2019-11-13 RX ADMIN — Medication 100 MILLIGRAM(S): at 08:44

## 2019-11-13 RX ADMIN — Medication 1 MILLIGRAM(S): at 05:53

## 2019-11-13 RX ADMIN — CLOPIDOGREL BISULFATE 75 MILLIGRAM(S): 75 TABLET, FILM COATED ORAL at 08:44

## 2019-11-13 RX ADMIN — Medication 650 MILLIGRAM(S): at 11:05

## 2019-11-13 RX ADMIN — Medication 100 MILLIGRAM(S): at 20:07

## 2019-11-13 RX ADMIN — DIVALPROEX SODIUM 500 MILLIGRAM(S): 500 TABLET, DELAYED RELEASE ORAL at 20:07

## 2019-11-13 RX ADMIN — Medication 81 MILLIGRAM(S): at 08:44

## 2019-11-13 RX ADMIN — HALOPERIDOL DECANOATE 2 MILLIGRAM(S): 100 INJECTION INTRAMUSCULAR at 17:45

## 2019-11-13 RX ADMIN — HALOPERIDOL DECANOATE 2.5 MILLIGRAM(S): 100 INJECTION INTRAMUSCULAR at 13:12

## 2019-11-13 RX ADMIN — Medication 1 DROP(S): at 20:12

## 2019-11-13 RX ADMIN — LATANOPROST 1 DROP(S): 0.05 SOLUTION/ DROPS OPHTHALMIC; TOPICAL at 20:07

## 2019-11-13 RX ADMIN — ATORVASTATIN CALCIUM 80 MILLIGRAM(S): 80 TABLET, FILM COATED ORAL at 20:07

## 2019-11-13 RX ADMIN — DIVALPROEX SODIUM 500 MILLIGRAM(S): 500 TABLET, DELAYED RELEASE ORAL at 08:44

## 2019-11-13 RX ADMIN — GABAPENTIN 1000 MILLIGRAM(S): 400 CAPSULE ORAL at 05:53

## 2019-11-13 RX ADMIN — Medication 25 MILLIGRAM(S): at 13:12

## 2019-11-14 LAB
GLUCOSE BLDC GLUCOMTR-MCNC: 103 MG/DL — HIGH (ref 70–99)
GLUCOSE BLDC GLUCOMTR-MCNC: 91 MG/DL — SIGNIFICANT CHANGE UP (ref 70–99)
GLUCOSE BLDC GLUCOMTR-MCNC: 92 MG/DL — SIGNIFICANT CHANGE UP (ref 70–99)

## 2019-11-14 PROCEDURE — 99232 SBSQ HOSP IP/OBS MODERATE 35: CPT

## 2019-11-14 RX ADMIN — Medication 25 MILLIGRAM(S): at 10:02

## 2019-11-14 RX ADMIN — METFORMIN HYDROCHLORIDE 1000 MILLIGRAM(S): 850 TABLET ORAL at 10:02

## 2019-11-14 RX ADMIN — GABAPENTIN 1000 MILLIGRAM(S): 400 CAPSULE ORAL at 12:51

## 2019-11-14 RX ADMIN — Medication 100 MILLIGRAM(S): at 20:44

## 2019-11-14 RX ADMIN — HALOPERIDOL DECANOATE 2.5 MILLIGRAM(S): 100 INJECTION INTRAMUSCULAR at 20:44

## 2019-11-14 RX ADMIN — Medication 25 MILLIGRAM(S): at 12:50

## 2019-11-14 RX ADMIN — Medication 650 MILLIGRAM(S): at 07:13

## 2019-11-14 RX ADMIN — AMLODIPINE BESYLATE 5 MILLIGRAM(S): 2.5 TABLET ORAL at 10:01

## 2019-11-14 RX ADMIN — GABAPENTIN 1000 MILLIGRAM(S): 400 CAPSULE ORAL at 20:44

## 2019-11-14 RX ADMIN — GABAPENTIN 1000 MILLIGRAM(S): 400 CAPSULE ORAL at 06:01

## 2019-11-14 RX ADMIN — ATORVASTATIN CALCIUM 80 MILLIGRAM(S): 80 TABLET, FILM COATED ORAL at 20:44

## 2019-11-14 RX ADMIN — METFORMIN HYDROCHLORIDE 1000 MILLIGRAM(S): 850 TABLET ORAL at 20:45

## 2019-11-14 RX ADMIN — DIVALPROEX SODIUM 500 MILLIGRAM(S): 500 TABLET, DELAYED RELEASE ORAL at 10:02

## 2019-11-14 RX ADMIN — Medication 100 MILLIGRAM(S): at 10:02

## 2019-11-14 RX ADMIN — DIVALPROEX SODIUM 500 MILLIGRAM(S): 500 TABLET, DELAYED RELEASE ORAL at 20:44

## 2019-11-14 RX ADMIN — Medication 1 MILLIGRAM(S): at 12:50

## 2019-11-14 RX ADMIN — Medication 1 MILLIGRAM(S): at 20:45

## 2019-11-14 RX ADMIN — Medication 1 MILLIGRAM(S): at 06:01

## 2019-11-14 RX ADMIN — Medication 3 MILLIGRAM(S): at 20:45

## 2019-11-14 RX ADMIN — Medication 1 DROP(S): at 20:44

## 2019-11-14 RX ADMIN — Medication 1 APPLICATION(S): at 10:02

## 2019-11-14 RX ADMIN — Medication 650 MILLIGRAM(S): at 06:39

## 2019-11-14 RX ADMIN — CLOPIDOGREL BISULFATE 75 MILLIGRAM(S): 75 TABLET, FILM COATED ORAL at 10:02

## 2019-11-14 RX ADMIN — Medication 25 MILLIGRAM(S): at 20:45

## 2019-11-14 RX ADMIN — LATANOPROST 1 DROP(S): 0.05 SOLUTION/ DROPS OPHTHALMIC; TOPICAL at 20:44

## 2019-11-14 RX ADMIN — Medication 81 MILLIGRAM(S): at 10:01

## 2019-11-14 RX ADMIN — HALOPERIDOL DECANOATE 2.5 MILLIGRAM(S): 100 INJECTION INTRAMUSCULAR at 12:51

## 2019-11-14 RX ADMIN — Medication 1 DROP(S): at 12:51

## 2019-11-14 RX ADMIN — HALOPERIDOL DECANOATE 2.5 MILLIGRAM(S): 100 INJECTION INTRAMUSCULAR at 06:01

## 2019-11-14 RX ADMIN — Medication 1 DROP(S): at 10:01

## 2019-11-14 RX ADMIN — Medication 100 MILLIGRAM(S): at 10:01

## 2019-11-15 VITALS — TEMPERATURE: 97 F

## 2019-11-15 LAB — GLUCOSE BLDC GLUCOMTR-MCNC: 115 MG/DL — HIGH (ref 70–99)

## 2019-11-15 PROCEDURE — 99238 HOSP IP/OBS DSCHRG MGMT 30/<: CPT

## 2019-11-15 RX ADMIN — GABAPENTIN 1000 MILLIGRAM(S): 400 CAPSULE ORAL at 06:37

## 2019-11-15 RX ADMIN — Medication 1 APPLICATION(S): at 08:38

## 2019-11-15 RX ADMIN — Medication 25 MILLIGRAM(S): at 08:38

## 2019-11-15 RX ADMIN — METFORMIN HYDROCHLORIDE 1000 MILLIGRAM(S): 850 TABLET ORAL at 08:38

## 2019-11-15 RX ADMIN — Medication 81 MILLIGRAM(S): at 08:37

## 2019-11-15 RX ADMIN — Medication 1 DROP(S): at 08:37

## 2019-11-15 RX ADMIN — AMLODIPINE BESYLATE 5 MILLIGRAM(S): 2.5 TABLET ORAL at 08:37

## 2019-11-15 RX ADMIN — HALOPERIDOL DECANOATE 2.5 MILLIGRAM(S): 100 INJECTION INTRAMUSCULAR at 06:37

## 2019-11-15 RX ADMIN — CLOPIDOGREL BISULFATE 75 MILLIGRAM(S): 75 TABLET, FILM COATED ORAL at 08:37

## 2019-11-15 RX ADMIN — DIVALPROEX SODIUM 500 MILLIGRAM(S): 500 TABLET, DELAYED RELEASE ORAL at 08:37

## 2019-11-15 RX ADMIN — Medication 100 MILLIGRAM(S): at 08:37

## 2019-11-15 RX ADMIN — Medication 100 MILLIGRAM(S): at 08:38

## 2019-12-17 NOTE — ED ADULT NURSE NOTE - NS ED NOTE ABUSE SUSPICION NEGLECT YN
----- Message from Rossana Herron sent at 12/17/2019  2:34 PM CST -----  Contact: self/ [phone]  Patient would like to get medical advice.  Symptoms (please be specific):  Diarrhea, vomiting, nausea, weak, fever 100.2, chills  How long has patient had these symptoms:  3 days  Pharmacy name and phone # (copy/paste from chart):  [id number] - [place] - [address]  AT [facility] [address] [phone] (Phone)  [phone] (Fax)  Any drug allergies (copy/paste from chart):      Would the patient rather a call back or a response via [facility]?:    Comments:     No

## 2020-03-05 NOTE — DIETITIAN INITIAL EVALUATION ADULT. - OBTAIN WEEKLY WEIGHT
No. ALDO screening performed.  STOP BANG Legend: 0-2 = LOW Risk; 3-4 = INTERMEDIATE Risk; 5-8 = HIGH Risk yes

## 2020-09-17 ENCOUNTER — APPOINTMENT (OUTPATIENT)
Dept: OTOLARYNGOLOGY | Facility: CLINIC | Age: 75
End: 2020-09-17

## 2020-10-02 ENCOUNTER — APPOINTMENT (OUTPATIENT)
Dept: OTOLARYNGOLOGY | Facility: CLINIC | Age: 75
End: 2020-10-02
Payer: MEDICARE

## 2020-10-02 VITALS — HEIGHT: 71 IN | TEMPERATURE: 98.6 F | BODY MASS INDEX: 28 KG/M2 | WEIGHT: 200 LBS

## 2020-10-02 DIAGNOSIS — J00 ACUTE NASOPHARYNGITIS [COMMON COLD]: ICD-10-CM

## 2020-10-02 DIAGNOSIS — H61.23 IMPACTED CERUMEN, BILATERAL: ICD-10-CM

## 2020-10-02 DIAGNOSIS — R13.10 DYSPHAGIA, UNSPECIFIED: ICD-10-CM

## 2020-10-02 PROCEDURE — 31231 NASAL ENDOSCOPY DX: CPT

## 2020-10-02 PROCEDURE — 99213 OFFICE O/P EST LOW 20 MIN: CPT | Mod: 25

## 2020-10-02 PROCEDURE — 69210 REMOVE IMPACTED EAR WAX UNI: CPT

## 2020-10-02 RX ORDER — HYDROXYZINE HYDROCHLORIDE 50 MG/1
TABLET ORAL
Refills: 0 | Status: ACTIVE | COMMUNITY

## 2020-10-02 RX ORDER — LOSARTAN POTASSIUM 100 MG/1
100 TABLET, FILM COATED ORAL
Qty: 30 | Refills: 0 | Status: DISCONTINUED | COMMUNITY
Start: 2018-08-31 | End: 2020-10-02

## 2020-10-02 RX ORDER — MECLIZINE HYDROCHLORIDE 25 MG/1
TABLET ORAL
Refills: 0 | Status: ACTIVE | COMMUNITY

## 2020-10-02 RX ORDER — GABAPENTIN 100 MG/1
CAPSULE ORAL
Refills: 0 | Status: ACTIVE | COMMUNITY

## 2020-10-02 RX ORDER — ALBUTEROL SULFATE 90 UG/1
108 (90 BASE) AEROSOL, METERED RESPIRATORY (INHALATION)
Qty: 1 | Refills: 3 | Status: DISCONTINUED | COMMUNITY
Start: 2018-09-24 | End: 2020-10-02

## 2020-10-02 RX ORDER — CLOPIDOGREL BISULFATE 300 MG/1
TABLET, FILM COATED ORAL
Refills: 0 | Status: ACTIVE | COMMUNITY

## 2020-10-02 RX ORDER — FEXOFENADINE HYDROCHLORIDE 180 MG/1
TABLET ORAL
Refills: 0 | Status: ACTIVE | COMMUNITY

## 2020-10-02 RX ORDER — FLUTICASONE PROPIONATE 50 UG/1
50 SPRAY, METERED NASAL DAILY
Qty: 2 | Refills: 1 | Status: ACTIVE | COMMUNITY
Start: 2020-10-02 | End: 1900-01-01

## 2020-10-02 RX ORDER — PEN NEEDLE, DIABETIC 32GX 5/32"
32G X 4 MM NEEDLE, DISPOSABLE MISCELLANEOUS
Qty: 100 | Refills: 0 | Status: DISCONTINUED | COMMUNITY
Start: 2016-11-09 | End: 2020-10-02

## 2020-10-02 RX ORDER — ALBUTEROL SULFATE 90 UG/1
108 (90 BASE) AEROSOL, METERED RESPIRATORY (INHALATION)
Qty: 1 | Refills: 5 | Status: DISCONTINUED | COMMUNITY
Start: 2017-03-20 | End: 2020-10-02

## 2020-10-02 RX ORDER — INSULIN DETEMIR 100 [IU]/ML
100 INJECTION, SOLUTION SUBCUTANEOUS
Qty: 15 | Refills: 0 | Status: DISCONTINUED | COMMUNITY
Start: 2016-11-10 | End: 2020-10-02

## 2020-10-02 RX ORDER — TADALAFIL 5 MG/1
5 TABLET, FILM COATED ORAL
Qty: 30 | Refills: 0 | Status: DISCONTINUED | COMMUNITY
Start: 2016-12-23 | End: 2020-10-02

## 2020-10-02 NOTE — REVIEW OF SYSTEMS
[Ear Itch] : ear itch [Nasal Congestion] : nasal congestion [Cough] : cough [de-identified] : rhinits  [de-identified] : diffucult swallowing

## 2020-10-02 NOTE — HISTORY OF PRESENT ILLNESS
[de-identified] : WONG MEJIA is a 75 year man with a history of CLL who has several complaints. He feels he has impacted cerumen. He has several months of profuse watery rhinorrhea worse when he eats.\par He complains of six months of  dysphagia for solids and some for liquids. he has coughing when he eats but he doesn’t think he is aspirating.

## 2020-10-02 NOTE — ASSESSMENT
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 He saw Dr. Porter  oncology several weeks ago and had CT.\par Regarding his choking and dysphagia I will send him for esophagram.\par \par I will speak with Dr. Gould re patient.

## 2020-10-02 NOTE — CONSULT LETTER
[Dear  ___] : Dear  [unfilled], [Consult Letter:] : I had the pleasure of evaluating your patient, [unfilled]. [Please see my note below.] : Please see my note below. [Consult Closing:] : Thank you very much for allowing me to participate in the care of this patient.  If you have any questions, please do not hesitate to contact me. [Sincerely,] : Sincerely, [FreeTextEntry3] : Andrade Ramirez MD\par

## 2020-10-27 ENCOUNTER — APPOINTMENT (OUTPATIENT)
Dept: OTOLARYNGOLOGY | Facility: CLINIC | Age: 75
End: 2020-10-27

## 2020-11-10 ENCOUNTER — APPOINTMENT (OUTPATIENT)
Dept: RADIOLOGY | Facility: HOSPITAL | Age: 75
End: 2020-11-10

## 2020-11-17 ENCOUNTER — APPOINTMENT (OUTPATIENT)
Dept: NEUROLOGY | Facility: CLINIC | Age: 75
End: 2020-11-17

## 2020-11-24 ENCOUNTER — APPOINTMENT (OUTPATIENT)
Dept: OTOLARYNGOLOGY | Facility: CLINIC | Age: 75
End: 2020-11-24

## 2020-11-25 NOTE — ED BEHAVIORAL HEALTH ASSESSMENT NOTE - HPI (INCLUDE ILLNESS QUALITY, SEVERITY, DURATION, TIMING, CONTEXT, MODIFYING FACTORS, ASSOCIATED SIGNS AND SYMPTOMS)
Pt is a 75yo SWM, domiciled alone in independent living apartment, with dx of AMBERLY and possible hx of Schizophrenia vs Schizoaffective d/o, appearing intellectually limited on exam as well, multiple prior psych hospitalizations, not currently in outpatient psychiatric treatment, no known hx of suicide attempts, unclear hx of manic symptoms, no clear hx of violence or arrests, no known substance abuse, PMH of SUSY, CAD s/p CABG, Type 2 DM, HTN, diabetic neuropathy, obesity, spinal stenosis, CLL (undergoing chemo therapy), was BIBEMS after Dr. Cancino called for pt to come for evaluation.    Pt admitted to [facility] 09/27-10/01/19 for almost exact same presentation as today. He reports that they did not help him while he was admitted and was "kicked out". Per documentation pt was psychiatrically and medically cleared so was discharged, however he refused to leave and had to be given 24 hour notice. Pt initially appealed the decision but ultimately left without issue.     Today pt continues to report frustration and anxiety related to his perceived lack of support at home. Pt states he does not feel he is able to continue living alone any longer despite having home health aides most days of the week. Pt states the aide will sit on the couch and play with their phone without helping him with what he needs (this is a repeated complaint from his prior admission). Pt states he is unable to feed or cook for himself, cannot get to the store to go shopping, clean his apartment or otherwise care for himself. Pt states he is also lonely at home without any social or family supports. Pt reports feeling frustrated with his living situation and gets intermittent passive SI with thoughts like "I want to lay down and just go to sleep" however denies any active SI, intent or plans. Denies any hx of suicidal behaviors. Denies self-harm. Pt states if he were to be transferred to a nursing home or assisted living than he would not have even the passive SI. Pt reports anxiety also about his living situation and multiple medical comorbidities including his CA diagnosis. Pt states he takes Seroquel and Klonopin at bedtime to help with sleep but does not have any outpatient therapist or psychiatrist to help manage this anxiety or frustration. Pt states he does not have any help managing his medications, is not sure of the doses and takes them as best he can. Pt reports he has not eaten in 2 days because no one made him food and he cannot manage on his own. He reports falling asleep well after taking Seroquel/Klonopin but often wakes up in the middle of the night tossing and turning. Pt denies any s/s of oneida, denies aggressive I/I/P. Denies feeling irritable. Pt is very loud on exam but is not agitated, states the volume of his voice increases when he is anxious. Pt reports occasional AH of whispers or music, denies any CAH and states the AH are not a major stressor at this time. Denies delusions of thought insertion, broadcasting or IOR - has mild paranoia that people do not like him, likely exacerbated by his social isolation. Protopic Counseling: Patient may experience a mild burning sensation during topical application. Protopic is not approved in children less than 2 years of age. There have been case reports of hematologic and skin malignancies in patients using topical calcineurin inhibitors although causality is questionable.

## 2020-12-03 ENCOUNTER — APPOINTMENT (OUTPATIENT)
Dept: OTOLARYNGOLOGY | Facility: CLINIC | Age: 75
End: 2020-12-03
Payer: MEDICARE

## 2020-12-03 DIAGNOSIS — K21.9 GASTRO-ESOPHAGEAL REFLUX DISEASE W/OUT ESOPHAGITIS: ICD-10-CM

## 2020-12-03 PROCEDURE — 99213 OFFICE O/P EST LOW 20 MIN: CPT

## 2020-12-03 RX ORDER — PANTOPRAZOLE 40 MG/1
40 TABLET, DELAYED RELEASE ORAL
Qty: 30 | Refills: 3 | Status: ACTIVE | COMMUNITY
Start: 2020-12-03 | End: 1900-01-01

## 2020-12-03 RX ORDER — FLUTICASONE PROPIONATE 50 UG/1
50 SPRAY, METERED NASAL DAILY
Qty: 2 | Refills: 5 | Status: ACTIVE | COMMUNITY
Start: 2020-12-03 | End: 1900-01-01

## 2020-12-03 NOTE — HISTORY OF PRESENT ILLNESS
[de-identified] : WONG MEJIA is a 75 year man with a history of dysphagia. Recent esophagram reveals moderate reflux otherwise normal. He is having choking sensation. Dr. Gould prescribed protonix 40 mg.

## 2020-12-03 NOTE — HISTORY OF PRESENT ILLNESS
[de-identified] : WONG MEJIA is a 75 year man with a history of dysphagia. Recent esophagram reveals moderate reflux otherwise normal. He is having choking sensation. Dr. Gould prescribed protonix 40 mg.

## 2020-12-03 NOTE — CONSULT LETTER
[Dear  ___] : Dear  [unfilled], [Consult Letter:] : I had the pleasure of evaluating your patient, [unfilled]. [Please see my note below.] : Please see my note below. [Sincerely,] : Sincerely, [FreeTextEntry3] : Andrade Ramirez MD\par

## 2020-12-03 NOTE — REVIEW OF SYSTEMS
[Ear Drainage] : ear drainage [Nasal Congestion] : nasal congestion [Negative] : Heme/Lymph [Patient Intake Form Reviewed] : Patient intake form was reviewed

## 2020-12-03 NOTE — ASSESSMENT
[FreeTextEntry1] : WONG MEJIA has Reflux on esophagram. He will use protonix. I reviewed the reflux diet with him. He has glaucoma and tear duct problems and asked him to refer him to new eye doctor.

## 2020-12-08 ENCOUNTER — APPOINTMENT (OUTPATIENT)
Dept: OPHTHALMOLOGY | Facility: CLINIC | Age: 75
End: 2020-12-08

## 2020-12-15 ENCOUNTER — APPOINTMENT (OUTPATIENT)
Dept: OTOLARYNGOLOGY | Facility: CLINIC | Age: 75
End: 2020-12-15

## 2020-12-29 ENCOUNTER — APPOINTMENT (OUTPATIENT)
Dept: OTOLARYNGOLOGY | Facility: CLINIC | Age: 75
End: 2020-12-29

## 2021-01-04 ENCOUNTER — APPOINTMENT (OUTPATIENT)
Dept: OPHTHALMOLOGY | Facility: CLINIC | Age: 76
End: 2021-01-04

## 2021-01-06 NOTE — PROGRESS NOTE ADULT - PROBLEM/PLAN-6
Continue current medications and walk for exercise. 24 hour Holter and Echo to evaluate post Covid-19 symptoms. Follow-up in 6 months with ECGsooner if needed. DISPLAY PLAN FREE TEXT

## 2021-01-29 ENCOUNTER — APPOINTMENT (OUTPATIENT)
Dept: OPHTHALMOLOGY | Facility: CLINIC | Age: 76
End: 2021-01-29

## 2021-02-09 ENCOUNTER — APPOINTMENT (OUTPATIENT)
Dept: OTOLARYNGOLOGY | Facility: CLINIC | Age: 76
End: 2021-02-09

## 2021-02-17 NOTE — PROGRESS NOTE BEHAVIORAL HEALTH - NSBHCONSULTSUBST_PSY_A_CORE
Initial SW/CM Assessment/Plan of Care Note     Baseline Assessment  80 year old admitted 2/16/2021 as Inpatient with a diagnosis of diarrhea/dehydration, also found to be neutropenic. Patient is currently receiving chemo for colon cancer. Prior to admission patient was living with son Sylvester. Normally patient lives alone, her son has been staying with her since she started chemo. Patient does not  have a Power of  for Healthcare. She states to CM that she remembers completing one many years ago but does not know where the paperwork is.  updated DARLENE Reeves to place an order for [facility] to see and complete POA for medical. Patient’s Primary Care Provider is No Pcp.     Medical History  Past Medical History:   Diagnosis Date   • Blood clot associated with vein wall inflammation    • Bronchitis    • Chronic pain    • Essential (primary) hypertension    • Gastroesophageal reflux disease    • High cholesterol    • Inflammatory bowel disease    • Malignant neoplasm (CMS/HCC)    • Osteoporosis    • Otitis media    • Sinusitis, chronic    • Thyroid condition    • Urinary tract infection        Prior to Admission Status       Agency/Support  Type of Services Prior to Hospitalization: None  Support Systems: Children, Family members  Home Devices/Equipment: None  Mobility Assist Devices: None  Sensory Support Devices: Eyeglasses, Hearing aids    Current Status  PT Ambulation Tips: Use gait belt, Walk to bathroom(CGA-SBA)  PT Transfer Tips:     OT Bathing Tips:    OT Dressing Tips:    OT Toileting Tips:    OT Feeding Tips:    SLP Swallow/Feeding Tips:    SLP Comm/Cog Tips:    Current Mental Status:    Stressors:      Insurance  Primary: MEDICARE  Secondary: AARP    Barriers to Discharge  Identified Barriers to Discharge/Transition Planning: Unresolved medical issues    Plan  PT/OT pending at this time. Patient is agreeable to Home Health services if recommended. Will monitor for further needs.    SW/CM - Recommendations 
for Discharge:     PT - Recommendations for Discharge:   Patient requires 24 hour nonskilled assistance to perform mobility and/or ADLs safely, Patient needs nondaily skilled therapy after discharge  OT - Recommendations for Discharge:      SLP - Recommendations for Discharge:     Anticipate patient will need post-hospital services. Necessary services are available.  Anticipate patient can return to the environment from which patient entered the hospital.   Anticipate patient can provide self-care at discharge.    Refer to / Flowsheet for Goals and objective data.     
no

## 2021-03-12 ENCOUNTER — APPOINTMENT (OUTPATIENT)
Dept: ENDOCRINOLOGY | Facility: CLINIC | Age: 76
End: 2021-03-12

## 2021-05-18 NOTE — PROGRESS NOTE BEHAVIORAL HEALTH - PRIMARY DX
Schizophrenia, chronic condition
Laparoscopic appendectomy
Schizophrenia, chronic condition

## 2021-10-05 ENCOUNTER — APPOINTMENT (OUTPATIENT)
Dept: ENDOCRINOLOGY | Facility: CLINIC | Age: 76
End: 2021-10-05
Payer: MEDICARE

## 2021-10-05 VITALS
DIASTOLIC BLOOD PRESSURE: 63 MMHG | BODY MASS INDEX: 29.44 KG/M2 | TEMPERATURE: 97.3 F | SYSTOLIC BLOOD PRESSURE: 130 MMHG | OXYGEN SATURATION: 98 % | HEART RATE: 97 BPM | WEIGHT: 192 LBS | HEIGHT: 67.72 IN

## 2021-10-05 DIAGNOSIS — E11.65 TYPE 2 DIABETES MELLITUS WITH HYPERGLYCEMIA: ICD-10-CM

## 2021-10-05 DIAGNOSIS — K21.9 GASTRO-ESOPHAGEAL REFLUX DISEASE W/OUT ESOPHAGITIS: ICD-10-CM

## 2021-10-05 DIAGNOSIS — I96 GANGRENE, NOT ELSEWHERE CLASSIFIED: ICD-10-CM

## 2021-10-05 DIAGNOSIS — H54.7 UNSPECIFIED VISUAL LOSS: ICD-10-CM

## 2021-10-05 DIAGNOSIS — E11.40 TYPE 2 DIABETES MELLITUS WITH DIABETIC NEUROPATHY, UNSPECIFIED: ICD-10-CM

## 2021-10-05 DIAGNOSIS — I25.10 ATHEROSCLEROTIC HEART DISEASE OF NATIVE CORONARY ARTERY W/OUT ANGINA PECTORIS: ICD-10-CM

## 2021-10-05 DIAGNOSIS — E78.5 HYPERLIPIDEMIA, UNSPECIFIED: ICD-10-CM

## 2021-10-05 DIAGNOSIS — F41.9 ANXIETY DISORDER, UNSPECIFIED: ICD-10-CM

## 2021-10-05 DIAGNOSIS — I10 ESSENTIAL (PRIMARY) HYPERTENSION: ICD-10-CM

## 2021-10-05 DIAGNOSIS — R91.8 OTHER NONSPECIFIC ABNORMAL FINDING OF LUNG FIELD: ICD-10-CM

## 2021-10-05 DIAGNOSIS — C91.10 CHRONIC LYMPHOCYTIC LEUKEMIA OF B-CELL TYPE NOT HAVING ACHIEVED REMISSION: ICD-10-CM

## 2021-10-05 DIAGNOSIS — F32.A DEPRESSION, UNSPECIFIED: ICD-10-CM

## 2021-10-05 LAB — GLUCOSE BLDC GLUCOMTR-MCNC: 439

## 2021-10-05 PROCEDURE — 99204 OFFICE O/P NEW MOD 45 MIN: CPT

## 2021-10-05 RX ORDER — SITAGLIPTIN 100 MG/1
100 TABLET, FILM COATED ORAL
Qty: 30 | Refills: 0 | Status: COMPLETED | COMMUNITY
Start: 2016-09-20 | End: 2021-10-05

## 2021-10-05 RX ORDER — ARIPIPRAZOLE 30 MG/1
30 TABLET ORAL
Qty: 30 | Refills: 0 | Status: COMPLETED | COMMUNITY
Start: 2016-09-29 | End: 2021-10-05

## 2021-10-05 RX ORDER — PERPHENAZINE 4 MG/1
4 TABLET ORAL
Qty: 60 | Refills: 0 | Status: COMPLETED | COMMUNITY
Start: 2018-06-14 | End: 2021-10-05

## 2021-10-05 RX ORDER — FESOTERODINE FUMARATE 8 MG/1
8 TABLET, FILM COATED, EXTENDED RELEASE ORAL
Qty: 30 | Refills: 0 | Status: COMPLETED | COMMUNITY
Start: 2016-09-29 | End: 2021-10-05

## 2021-10-05 RX ORDER — TRAMADOL HYDROCHLORIDE 25 MG/1
TABLET, COATED ORAL
Refills: 0 | Status: COMPLETED | COMMUNITY
End: 2021-10-05

## 2021-10-05 RX ORDER — PREGABALIN 50 MG/1
50 CAPSULE ORAL
Qty: 90 | Refills: 0 | Status: COMPLETED | COMMUNITY
Start: 2016-12-15 | End: 2021-10-05

## 2021-10-05 RX ORDER — FENOFIBRIC ACID 135 MG/1
135 CAPSULE, DELAYED RELEASE ORAL
Qty: 30 | Refills: 0 | Status: COMPLETED | COMMUNITY
Start: 2016-10-28 | End: 2021-10-05

## 2021-10-05 RX ORDER — LITHIUM CARBONATE 300 MG/1
300 CAPSULE ORAL
Qty: 30 | Refills: 0 | Status: COMPLETED | COMMUNITY
Start: 2016-09-20 | End: 2021-10-05

## 2021-10-05 RX ORDER — EMPAGLIFLOZIN 10 MG/1
10 TABLET, FILM COATED ORAL DAILY
Qty: 90 | Refills: 2 | Status: COMPLETED | COMMUNITY
Start: 2021-10-05 | End: 2022-07-02

## 2021-10-05 RX ORDER — ATORVASTATIN CALCIUM 40 MG/1
40 TABLET, FILM COATED ORAL
Qty: 30 | Refills: 0 | Status: COMPLETED | COMMUNITY
Start: 2016-10-28 | End: 2021-10-05

## 2021-10-05 RX ORDER — ISOSORBIDE MONONITRATE 30 MG/1
30 TABLET, EXTENDED RELEASE ORAL
Refills: 0 | Status: ACTIVE | COMMUNITY

## 2021-10-05 RX ORDER — TAMSULOSIN HYDROCHLORIDE 0.4 MG/1
0.4 CAPSULE ORAL
Qty: 30 | Refills: 0 | Status: COMPLETED | COMMUNITY
Start: 2016-10-28 | End: 2021-10-05

## 2021-10-06 PROBLEM — C91.10 CLL (CHRONIC LYMPHOCYTIC LEUKEMIA): Status: ACTIVE | Noted: 2021-10-06

## 2021-10-06 PROBLEM — E11.65 DIABETES MELLITUS TYPE 2, UNCONTROLLED: Status: ACTIVE | Noted: 2021-10-05

## 2021-10-06 PROBLEM — E78.5 HYPERLIPIDEMIA: Status: ACTIVE | Noted: 2021-10-06

## 2021-10-06 PROBLEM — I10 BENIGN ESSENTIAL HYPERTENSION: Status: ACTIVE | Noted: 2021-10-06

## 2021-10-06 PROBLEM — E11.40 NEUROPATHY, DIABETIC: Status: ACTIVE | Noted: 2021-10-06

## 2021-10-06 PROBLEM — H54.7 VISION DECREASED: Status: ACTIVE | Noted: 2021-10-06

## 2021-10-06 PROBLEM — K21.9 GERD (GASTROESOPHAGEAL REFLUX DISEASE): Status: ACTIVE | Noted: 2021-10-06

## 2021-10-06 PROBLEM — R91.8 LUNG NODULES: Status: ACTIVE | Noted: 2021-10-06

## 2021-10-06 PROBLEM — I96 GANGRENE: Status: ACTIVE | Noted: 2021-10-06

## 2021-10-06 PROBLEM — F32.A DEPRESSION: Status: ACTIVE | Noted: 2021-10-06

## 2021-10-06 PROBLEM — F41.9 ANXIETY: Status: ACTIVE | Noted: 2021-10-06

## 2021-10-06 PROBLEM — I25.10 CAD (CORONARY ARTERY DISEASE): Status: ACTIVE | Noted: 2021-10-06

## 2021-10-06 RX ORDER — QUETIAPINE FUMARATE 100 MG/1
100 TABLET ORAL
Refills: 0 | Status: ACTIVE | COMMUNITY

## 2021-10-06 NOTE — REVIEW OF SYSTEMS
[Recent Weight Loss (___ Lbs)] : recent weight loss: [unfilled] lbs [Blurred Vision] : blurred vision [Heartburn] : heartburn [Dizziness] : dizziness [Pain/Numbness of Digits] : pain/numbness of digits [Depression] : depression [Anxiety] : anxiety [All other systems negative] : All other systems negative

## 2021-10-06 NOTE — ASSESSMENT
[FreeTextEntry1] : This is a 76-year-old male with type 2 diabetes mellitus with neuropathy, CAD/CABG/stent, CLL, anxiety, depression, GERD, hypertension, hyperlipidemia, lung nodules, foot gangrene, hypertriglyceridemia, decreased vision, here for evaluation.\par Hemoglobin A1c is uncontrolled (12.1%).\par He is currently on Metformin 1000 mg 2 times a day. Glucose level is 439 today.\par He does not self monitor blood glucose at home as he reports he is unable to see glucometer or please lancet and lancing device. He does not want to try talking glucometer. He does not want to try CGM.\par He also has tried insulin in the past however he reports he was unable to use insulin due to vision problems.\par Will consider weekly GLP-1 agonist. Will contact insurance for coverage. There is no history of pancreatitis. GI side effects reviewed. There is no personal family history of thyroid cancer.\par He is unsure of when his last ophthalmology exam was. Appointment advised.\par He denies nephropathy. He is unable to provide urine sample today.\par He has neuropathy.\par Appointment with CDE.\par He is on rosuvastatin 10 mg daily. LDL is at goal (30).\par Blood pressure is controlled.\par He will follow-up with podiatry regarding wound on right fourth toe.

## 2021-10-06 NOTE — HISTORY OF PRESENT ILLNESS
[FreeTextEntry1] : CC: Diabetes\par This is a 76-year-old male with type 2 diabetes mellitus with neuropathy, CAD/CABG/stent, CLL, anxiety, depression, GERD, hypertension, hyperlipidemia, lung nodules, foot gangrene, hypertriglyceridemia, decreased vision, here for evaluation.\par He is unsure of when diabetes was diagnosed.\par He is currently on Metformin 1000 mg 2 times a day.\par He does not self monitor blood glucose at home as he reports he is unable to see glucometer or please lancet and lancing device.\par He was on Januvia and Jardiance in the past. He reports he did not have side effects to these medications but they were discontinued. He also has tried insulin in the past however he reports he was unable to use insulin due to vision problems.\par He is unsure of when his last ophthalmology exam was.\par He denies nephropathy.\par He has neuropathy.\par He is on rosuvastatin 10 mg daily.

## 2021-10-06 NOTE — PHYSICAL EXAM
[Alert] : alert [Well Nourished] : well nourished [Healthy Appearance] : healthy appearance [No Acute Distress] : no acute distress [Well Developed] : well developed [Normal Sclera/Conjunctiva] : normal sclera/conjunctiva [No Proptosis] : no proptosis [No Neck Mass] : no neck mass was observed [No LAD] : no lymphadenopathy [Supple] : the neck was supple [Thyroid Not Enlarged] : the thyroid was not enlarged [No Thyroid Nodules] : no palpable thyroid nodules [No Respiratory Distress] : no respiratory distress [Normal Rate] : heart rate was normal [No Edema] : no peripheral edema [Not Distended] : not distended [No Stigmata of Cushings Syndrome] : no stigmata of Cushings Syndrome [Normal Gait] : normal gait [No Clubbing, Cyanosis] : no clubbing  or cyanosis of the fingernails [Right foot was examined, including] : right foot ~C was examined, including visual inspection with sensory and pulse exams [Left foot was examined, including] : left foot ~C was examined, including visual inspection with sensory and pulse exams [Normal] : normal [1+] : 1+ in the dorsalis pedis [Diminished Throughout Both Feet] : diminished tactile sensation with monofilament testing throughout both feet [#9 Diminished] : number 9 was diminished [#1 Diminished] : number 1 was diminished [#2 Diminished] : number 2 was diminished [#3 Diminished] : number 3 was diminished [#8 Diminished] : number 8 was diminished [Acanthosis Nigricans] : no acanthosis nigricans [de-identified] : 1+ pedal pulses [de-identified] : Wound right fourth toe.

## 2021-10-15 ENCOUNTER — NON-APPOINTMENT (OUTPATIENT)
Age: 76
End: 2021-10-15

## 2021-11-30 ENCOUNTER — APPOINTMENT (OUTPATIENT)
Dept: NEUROLOGY | Facility: CLINIC | Age: 76
End: 2021-11-30

## 2021-12-06 ENCOUNTER — NON-APPOINTMENT (OUTPATIENT)
Age: 76
End: 2021-12-06

## 2021-12-14 ENCOUNTER — APPOINTMENT (OUTPATIENT)
Dept: ENDOCRINOLOGY | Facility: CLINIC | Age: 76
End: 2021-12-14

## 2022-01-04 ENCOUNTER — APPOINTMENT (OUTPATIENT)
Dept: ENDOCRINOLOGY | Facility: CLINIC | Age: 77
End: 2022-01-04

## 2022-01-31 NOTE — PATIENT PROFILE ADULT - PRIMARY SOURCE OF SUPPORT/COMFORT
Continue to monitor urine and stool. Continue to monitor for signs of bleeding. Return to clinic in 5 weeks. Continue warfarin whole 3 mg tablet every Friday and 1.5 tablets for 4.5 mg all other days.
no one

## 2022-03-09 NOTE — PROGRESS NOTE BEHAVIORAL HEALTH - PROBLEM SELECTOR PLAN 1
Addended by: Elsi Mena on: 3/9/2022 05:08 PM     Modules accepted: Level of Service Increase Seroquel to 100 mg qhs  SW to set up SW in the community. Pt will benefit from supportive living placement as he often presents to ED with c/o being lonely and needing more support. SW to set up SW in the community. Pt will benefit from supportive living placement as he often presents to ED with c/o being lonely and needing more support.

## 2022-08-31 NOTE — PROGRESS NOTE BEHAVIORAL HEALTH - NS ED BHA REVIEW OF ED CHART VITAL SIGNS REVIEWED
Yes
Topical Ketoconazole Pregnancy And Lactation Text: This medication is Pregnancy Category B and is considered safe during pregnancy. It is unknown if it is excreted in breast milk.

## 2022-12-07 NOTE — CHART NOTE - NSCHARTNOTEFT_GEN_A_CORE
Upon Nutritional Assessment by the Registered Dietitian your patient was determined to meet criteria / has evidence of the following diagnosis/diagnoses:          [ ]  Mild Protein Calorie Malnutrition        [X ]  Moderate Protein Calorie Malnutrition        [ ] Severe Protein Calorie Malnutrition        [ ] Unspecified Protein Calorie Malnutrition        [ ] Underweight / BMI <19        [ ] Morbid Obesity / BMI > 40      Findings as based on:  •  Comprehensive nutrition assessment and consultation    No physical s/s malnutrition appreciated though pt presents with reported inadequate energy intake of <75% for >7 days and unintentional wt loss of >5% for 1 month    Treatment:    The following diet has been recommended:    Recommend addition of DASH/TLC diet restriction; pt not amenable to Glucerna Shakes at this time  Recommend MVI w/minerals  Encourage PO intake      PROVIDER Section:     By signing this assessment you are acknowledging and agree with the diagnosis/diagnoses assigned by the Registered Dietitian    Comments: Simponi Counseling:  I discussed with the patient the risks of golimumab including but not limited to myelosuppression, immunosuppression, autoimmune hepatitis, demyelinating diseases, lymphoma, and serious infections.  The patient understands that monitoring is required including a PPD at baseline and must alert us or the primary physician if symptoms of infection or other concerning signs are noted.

## 2023-01-25 NOTE — ED PROVIDER NOTE - TOBACCO USE
6/29/2020         RE: Lavonne Tidwell  [address] [facility] [id number]        Dear Colleague,    Thank you for referring your patient, Lavonne Tidwell, to the [facility]. Please see a copy of my visit note below.    Chief Complaint   Patient presents with     Otitis Media     History of Present Illness  Lavonne Tidwell is a 82 year old female presents today for follow-up.  I initially evaluated the patient on 4/29/2020 with onset of otorrhea and decreased hearing in the left ear.  The patient appeared to have serous otitis media on examination.  Her tuning fork suggested a conductive hearing loss in the left ear.  We discussed placing ear tube versus good nasal regimen and auto insufflation for a few weeks.  The patient elected for conservative management.  Repeat exam on 5/20/2020 suggested persistent but resolving middle ear effusion on the left-hand side.  She felt like her ear was improving and so we again elected for observation.  She returns today for recheck with audiogram.    Seeing the patient at end of May, the patient reports having an improvement in the left ear.  She is now able to easily use her phone the left.  Overall she feels like her ears are essentially the same.  He denies any otalgia or otorrhea.  She has no history of ear surgery.    Past Medical History  Patient Active Problem List   Diagnosis     Injury of sigmoid colon     Esophageal reflux     Menopausal syndrome (hot flashes)     Urinary incontinence     Atrophic vaginitis     Hyperlipidemia LDL goal <130     Advanced directives, counseling/discussion     Onychomycosis     Senile nuclear sclerosis     Status post total left knee replacement     Status post total right knee replacement     Primary localized osteoarthrosis, lower leg     Tubulovillous adenoma polyp of colon     Current Medications    Current Outpatient Medications:      Acetaminophen (TYLENOL PO), Take 1,000 mg by mouth 2 times daily as needed 
Patient denies PPM.  
for mild pain or fever, Disp: , Rfl:      Biotin 10 MG CAPS, , Disp: , Rfl:      CALCIUM + D OR, 2 TABLET DAILY, Disp: , Rfl:      Docusate Calcium (STOOL SOFTENER PO), , Disp: , Rfl:      fluticasone (FLONASE) 50 MCG/ACT nasal spray, USE TWO SPRAYS IN EACH NOSTRIL ONCE DAILY AS NEEDED, Disp: 16 g, Rfl: 3     garlic 150 MG TABS tablet, Take 150 mg by mouth daily, Disp: , Rfl:      grape seed 50 MG CAPS, Take 50 mg by mouth daily, Disp: , Rfl:      loratadine (CLARITIN) 10 MG tablet, Take 10 mg by mouth daily, Disp: , Rfl:      methylcellulose (FIBER THERAPY) 500 MG TABS tablet, Take 500 mg by mouth daily, Disp: , Rfl:      Multiple Vitamins-Minerals (CENTRUM SILVER) per tablet, Take 1 tablet by mouth daily, Disp: , Rfl:      VITAMIN E COMPLEX PO, , Disp: , Rfl:      Zinc Sulfate (ZINC 15 PO), , Disp: , Rfl:     Allergies  Allergies   Allergen Reactions     Codeine GI Disturbance       Social History  Social History     Socioeconomic History     Marital status:      Spouse name: Not on file     Number of children: Not on file     Years of education: Not on file     Highest education level: Not on file   Occupational History     Not on file   Social Needs     Financial resource strain: Not on file     Food insecurity     Worry: Not on file     Inability: Not on file     Transportation needs     Medical: Not on file     Non-medical: Not on file   Tobacco Use     Smoking status: Former Smoker     Packs/day: 0.50     Years: 5.00     Pack years: 2.50     Types: Cigarettes     Start date: 1968     Last attempt to quit: 2/15/1972     Years since quittin.4     Smokeless tobacco: Never Used     Tobacco comment: stopped in her 20's   Substance and Sexual Activity     Alcohol use: Yes     Alcohol/week: 0.0 standard drinks     Comment: Occasional     Drug use: No     Sexual activity: Not Currently   Lifestyle     Physical activity     Days per week: Not on file     Minutes per session: Not on file     Stress: 
Not on file   Relationships     Social connections     Talks on phone: Not on file     Gets together: Not on file     Attends Rastafari service: Not on file     Active member of club or organization: Not on file     Attends meetings of clubs or organizations: Not on file     Relationship status: Not on file     Intimate partner violence     Fear of current or ex partner: Not on file     Emotionally abused: Not on file     Physically abused: Not on file     Forced sexual activity: Not on file   Other Topics Concern     Parent/sibling w/ CABG, MI or angioplasty before 65F 55M? Yes   Social History Narrative     Not on file       Family History  Family History   Problem Relation Age of Onset     Cancer Mother         Ovarian     Other Cancer Mother         Ovarian     Cerebrovascular Disease Father      Heart Disease Father      C.A.D. Father      Coronary Artery Disease Father      Cancer Maternal Grandmother      Other Cancer Maternal Grandmother         Ovarian/Bone     Cerebrovascular Disease Maternal Grandfather      Colon Cancer Maternal Grandfather      Diabetes Other         Great Grandmother     Breast Cancer No family hx of        Review of Systems  As per HPI and PMHx, otherwise 10 system review including the head and neck, constitutional, eyes, respiratory, GI, skin, neurologic, lymphatic, endocrine, and allergy systems is negative.    Physical Exam  /65 (BP Location: Left arm, Patient Position: Sitting, Cuff Size: Adult Regular)   Pulse 67   Temp 98.3  F (36.8  C) (Tympanic)   Resp 20   GENERAL: Patient is a pleasant, cooperative 82 year old female in no acute distress.  HEAD: Normocephalic, atraumatic.  Hair and scalp are normal.  EYES: Pupils are equal, round, reactive to light and accommodation.  Extraocular movements are intact.  The sclera nonicteric without injection.  The extraocular structures are normal.  EARS: Normal shape and symmetry.  No tenderness when palpating the mastoid or tragal 
areas bilaterally.  Otoscopic exam on the right reveals a clear canal.  The right tympanic membrane is round, intact without evidence of effusion, good landmarks.  No retraction, granulation, drainage, or evidence of cholesteatoma.  Otoscopic exam on the left reveals a clear canal. The left tympanic membrane was round, without evidence of middle ear effusion.  No retraction or perforation noted.  No evidence of cholesteatoma.   NEUROLOGIC: Cranial nerves II through XII are grossly intact.  Voice is strong.  Facial nerve examination is limited due to wearing a mask.    CARDIOVASCULAR: Extremities are warm and well-perfused.  No significant peripheral edema.  RESPIRATORY: Patient has nonlabored breathing without cough, wheeze, stridor.  PSYCHIATRIC: Patient is alert and oriented.  Mood and affect appear normal.  SKIN: Warm and dry.  No scalp, face, or neck lesions noted.     Audiogram  The patient underwent an audiogram performed today.  My review of the audiogram shows normal hearing sloping to mild sensorineural loss in the high tones in the right and normal hearing sloping to mild sloping to severe mixed hearing loss in the left ear.  Pure-tone average is 14 dB on the right and 30 dB on the left.  Speech reception threshhold is 15 dB on the right and 20 dB on the left.  The patient had 100% word recognition on the right and 100% word recognition on the left.  The patient had a type A tympanogram on the right and a type A tympanogram on the left.     Assessment and Plan     ICD-10-CM    1. OME (otitis media with effusion), left  H65.92 AUDIOLOGY ADULT REFERRAL     CANCELED: Microscopy, Binocular   2. Mixed conductive and sensorineural hearing loss of left ear with restricted hearing of right ear  H90.A32 AUDIOLOGY ADULT REFERRAL     CANCELED: Microscopy, Binocular   3. Sensorineural hearing loss (SNHL) of right ear with restricted hearing of left ear  H90.A21 AUDIOLOGY ADULT REFERRAL     CANCELED: Microscopy, 
"Binocular      It was my pleasure seeing Lavonne Tidwell today in clinic.  The patient appears to have resolved her left middle ear effusion.  She still has a moderate to severe high tone mixed loss but her bone line does approximate the right ear.  I think given her speech reception being at 20 dB with 100% word recognition in the left ear, observation is warranted.  The patient is not having any functional deficits.  I recommend to recheck her hearing in 1 to 3 years or sooner if her symptoms warrant.    Rob Harris MD  Department of Otolarygology-Head and Neck Surgery  [facility]       Initial /65 (BP Location: Left arm, Patient Position: Sitting, Cuff Size: Adult Regular)   Pulse 67   Temp 98.3  F (36.8  C) (Tympanic)   Resp 20  Estimated body mass index is 28.62 kg/m  as calculated from the following:    Height as of 5/20/20: 1.651 m (5' 5\").    Weight as of 5/20/20: 78 kg (172 lb). .    Tena VELARDE RN   Specialty Clinics     Again, thank you for allowing me to participate in the care of your patient.        Sincerely,        Rob Harris MD    "
Statement Selected
Never smoker

## 2023-05-15 NOTE — PATIENT PROFILE ADULT - ANY IMPAIRED UPPER EXTREMITY FUNCTION WITHIN A WEEK PRIOR TO ADMISSION RELATED TO?
[Long Term Vascular Complications] : long term vascular complications of diabetes [Carbohydrate Consistent Diet] : carbohydrate consistent diet [Importance of Diet and Exercise] : importance of diet and exercise to improve glycemic control, achieve weight loss and improve cardiovascular health [Hypoglycemia Management] : hypoglycemia management [Self Monitoring of Blood Glucose] : self monitoring of blood glucose [Injection Technique, Storage, Sharps Disposal] : injection technique, storage, and sharps disposal [FreeTextEntry1] : 1) DM2: improving control A1C . target of <7%. Natural hx of the disease and importance of treatment targets discussed at length, she verbalized understanding. ADA diet and importance of exercise discussed at length. Plan is to increase metformin to full dose and increase GLP-1 agonist today (ozempic 1 mg). Reduce Treiba to 60units QHS, reduce pioglitazone to 30 mg PO QD titration instructions provided. Refer to Nutritionist today. We melisa check microalbumin, lipids and labs on the NV. Discuss vaccines and podiatry/opthalmology referrals on NV (eye exam referral provided today.\par \par 2) Weight gain:  complicated by DM2. Discussed medical  strategies. Pt would like to try lifestyle modifications and GLP-1 agonist therapy at this time. Reassess on the NV for at least ~5% TBW loss.\par \par 3) Essential HTN: Pt is at goal BP and on an ACE inh. + microalb at 176, monitor in 4 months.\par  \par 4) Dyslipidemia: Pt is on a moderate intensity statin. REassess lipids on the next visit. LDL target <100. now at 69\par \par 5) outside CT of the A/P w/ bone degenerative changes, refer for OP screening.\par  no

## 2023-06-09 ENCOUNTER — APPOINTMENT (OUTPATIENT)
Dept: OTOLARYNGOLOGY | Facility: CLINIC | Age: 78
End: 2023-06-09

## 2023-06-14 NOTE — ED BEHAVIORAL HEALTH ASSESSMENT NOTE - TIME CONSULT PERFORMED
Retention Suture Text: Retention sutures were placed to support the closure and prevent dehiscence. 27-Sep-9000 10:30

## 2023-07-13 ENCOUNTER — EMERGENCY (EMERGENCY)
Facility: HOSPITAL | Age: 78
LOS: 1 days | Discharge: HOME | End: 2023-07-13
Attending: EMERGENCY MEDICINE
Payer: MEDICARE

## 2023-07-13 VITALS
HEART RATE: 83 BPM | SYSTOLIC BLOOD PRESSURE: 120 MMHG | TEMPERATURE: 98 F | RESPIRATION RATE: 18 BRPM | DIASTOLIC BLOOD PRESSURE: 70 MMHG | OXYGEN SATURATION: 99 %

## 2023-07-13 VITALS
WEIGHT: 225.09 LBS | DIASTOLIC BLOOD PRESSURE: 72 MMHG | OXYGEN SATURATION: 97 % | HEIGHT: 72 IN | SYSTOLIC BLOOD PRESSURE: 140 MMHG | RESPIRATION RATE: 18 BRPM | TEMPERATURE: 98 F | HEART RATE: 99 BPM

## 2023-07-13 DIAGNOSIS — Z95.1 PRESENCE OF AORTOCORONARY BYPASS GRAFT: Chronic | ICD-10-CM

## 2023-07-13 LAB
ALBUMIN SERPL ELPH-MCNC: 3.5 G/DL — SIGNIFICANT CHANGE UP (ref 3.5–5)
ALP SERPL-CCNC: 116 U/L — SIGNIFICANT CHANGE UP (ref 40–120)
ALT FLD-CCNC: 21 U/L DA — SIGNIFICANT CHANGE UP (ref 10–60)
ANION GAP SERPL CALC-SCNC: 10 MMOL/L — SIGNIFICANT CHANGE UP (ref 5–17)
AST SERPL-CCNC: 13 U/L — SIGNIFICANT CHANGE UP (ref 10–40)
BASOPHILS # BLD AUTO: 0.05 K/UL — SIGNIFICANT CHANGE UP (ref 0–0.2)
BASOPHILS NFR BLD AUTO: 0.5 % — SIGNIFICANT CHANGE UP (ref 0–2)
BILIRUB SERPL-MCNC: 1.1 MG/DL — SIGNIFICANT CHANGE UP (ref 0.2–1.2)
BUN SERPL-MCNC: 11 MG/DL — SIGNIFICANT CHANGE UP (ref 7–18)
CALCIUM SERPL-MCNC: 8.7 MG/DL — SIGNIFICANT CHANGE UP (ref 8.4–10.5)
CHLORIDE SERPL-SCNC: 109 MMOL/L — HIGH (ref 96–108)
CO2 SERPL-SCNC: 23 MMOL/L — SIGNIFICANT CHANGE UP (ref 22–31)
CREAT SERPL-MCNC: 0.84 MG/DL — SIGNIFICANT CHANGE UP (ref 0.5–1.3)
EGFR: 90 ML/MIN/1.73M2 — SIGNIFICANT CHANGE UP
EOSINOPHIL # BLD AUTO: 0.23 K/UL — SIGNIFICANT CHANGE UP (ref 0–0.5)
EOSINOPHIL NFR BLD AUTO: 2.2 % — SIGNIFICANT CHANGE UP (ref 0–6)
GLUCOSE SERPL-MCNC: 107 MG/DL — HIGH (ref 70–99)
HCT VFR BLD CALC: 43 % — SIGNIFICANT CHANGE UP (ref 39–50)
HGB BLD-MCNC: 14.1 G/DL — SIGNIFICANT CHANGE UP (ref 13–17)
IMM GRANULOCYTES NFR BLD AUTO: 0.5 % — SIGNIFICANT CHANGE UP (ref 0–0.9)
LYMPHOCYTES # BLD AUTO: 1.85 K/UL — SIGNIFICANT CHANGE UP (ref 1–3.3)
LYMPHOCYTES # BLD AUTO: 18 % — SIGNIFICANT CHANGE UP (ref 13–44)
MAGNESIUM SERPL-MCNC: 2.1 MG/DL — SIGNIFICANT CHANGE UP (ref 1.6–2.6)
MCHC RBC-ENTMCNC: 29.2 PG — SIGNIFICANT CHANGE UP (ref 27–34)
MCHC RBC-ENTMCNC: 32.8 GM/DL — SIGNIFICANT CHANGE UP (ref 32–36)
MCV RBC AUTO: 89 FL — SIGNIFICANT CHANGE UP (ref 80–100)
MONOCYTES # BLD AUTO: 0.72 K/UL — SIGNIFICANT CHANGE UP (ref 0–0.9)
MONOCYTES NFR BLD AUTO: 7 % — SIGNIFICANT CHANGE UP (ref 2–14)
NEUTROPHILS # BLD AUTO: 7.37 K/UL — SIGNIFICANT CHANGE UP (ref 1.8–7.4)
NEUTROPHILS NFR BLD AUTO: 71.8 % — SIGNIFICANT CHANGE UP (ref 43–77)
NRBC # BLD: 0 /100 WBCS — SIGNIFICANT CHANGE UP (ref 0–0)
NT-PROBNP SERPL-SCNC: 254 PG/ML — SIGNIFICANT CHANGE UP (ref 0–450)
PLATELET # BLD AUTO: 240 K/UL — SIGNIFICANT CHANGE UP (ref 150–400)
POTASSIUM SERPL-MCNC: 4 MMOL/L — SIGNIFICANT CHANGE UP (ref 3.5–5.3)
POTASSIUM SERPL-SCNC: 4 MMOL/L — SIGNIFICANT CHANGE UP (ref 3.5–5.3)
PROT SERPL-MCNC: 6.8 G/DL — SIGNIFICANT CHANGE UP (ref 6–8.3)
RBC # BLD: 4.83 M/UL — SIGNIFICANT CHANGE UP (ref 4.2–5.8)
RBC # FLD: 14.4 % — SIGNIFICANT CHANGE UP (ref 10.3–14.5)
SODIUM SERPL-SCNC: 142 MMOL/L — SIGNIFICANT CHANGE UP (ref 135–145)
TROPONIN I, HIGH SENSITIVITY RESULT: 7 NG/L — SIGNIFICANT CHANGE UP
WBC # BLD: 10.27 K/UL — SIGNIFICANT CHANGE UP (ref 3.8–10.5)
WBC # FLD AUTO: 10.27 K/UL — SIGNIFICANT CHANGE UP (ref 3.8–10.5)

## 2023-07-13 PROCEDURE — 96374 THER/PROPH/DIAG INJ IV PUSH: CPT

## 2023-07-13 PROCEDURE — 80053 COMPREHEN METABOLIC PANEL: CPT

## 2023-07-13 PROCEDURE — 73630 X-RAY EXAM OF FOOT: CPT

## 2023-07-13 PROCEDURE — 93010 ELECTROCARDIOGRAM REPORT: CPT

## 2023-07-13 PROCEDURE — 93005 ELECTROCARDIOGRAM TRACING: CPT

## 2023-07-13 PROCEDURE — 73630 X-RAY EXAM OF FOOT: CPT | Mod: 26,50

## 2023-07-13 PROCEDURE — 83880 ASSAY OF NATRIURETIC PEPTIDE: CPT

## 2023-07-13 PROCEDURE — 99285 EMERGENCY DEPT VISIT HI MDM: CPT | Mod: 25

## 2023-07-13 PROCEDURE — 83735 ASSAY OF MAGNESIUM: CPT

## 2023-07-13 PROCEDURE — 71045 X-RAY EXAM CHEST 1 VIEW: CPT | Mod: 26

## 2023-07-13 PROCEDURE — 84484 ASSAY OF TROPONIN QUANT: CPT

## 2023-07-13 PROCEDURE — 99284 EMERGENCY DEPT VISIT MOD MDM: CPT

## 2023-07-13 PROCEDURE — 71045 X-RAY EXAM CHEST 1 VIEW: CPT

## 2023-07-13 PROCEDURE — 85025 COMPLETE CBC W/AUTO DIFF WBC: CPT

## 2023-07-13 PROCEDURE — 36415 COLL VENOUS BLD VENIPUNCTURE: CPT

## 2023-07-13 RX ORDER — SODIUM CHLORIDE 9 MG/ML
1000 INJECTION INTRAMUSCULAR; INTRAVENOUS; SUBCUTANEOUS ONCE
Refills: 0 | Status: COMPLETED | OUTPATIENT
Start: 2023-07-13 | End: 2023-07-13

## 2023-07-13 RX ORDER — AMPICILLIN SODIUM AND SULBACTAM SODIUM 250; 125 MG/ML; MG/ML
3 INJECTION, POWDER, FOR SUSPENSION INTRAMUSCULAR; INTRAVENOUS ONCE
Refills: 0 | Status: COMPLETED | OUTPATIENT
Start: 2023-07-13 | End: 2023-07-13

## 2023-07-13 RX ORDER — KETOROLAC TROMETHAMINE 30 MG/ML
30 SYRINGE (ML) INJECTION ONCE
Refills: 0 | Status: DISCONTINUED | OUTPATIENT
Start: 2023-07-13 | End: 2023-07-13

## 2023-07-13 RX ADMIN — Medication 30 MILLIGRAM(S): at 09:03

## 2023-07-13 RX ADMIN — SODIUM CHLORIDE 1000 MILLILITER(S): 9 INJECTION INTRAMUSCULAR; INTRAVENOUS; SUBCUTANEOUS at 09:03

## 2023-07-13 NOTE — ED ADULT TRIAGE NOTE - CHIEF COMPLAINT QUOTE
Patient states " I have sore and swelling on my feet, I have diabetes " " I need  "  Patient claims he lives alone and need to stay in the hospital and he needs help

## 2023-07-13 NOTE — ED ADULT NURSE REASSESSMENT NOTE - NS ED NURSE REASSESS COMMENT FT1
Code grey called- Pt refusing antibiotics, pt states he wants to go home, and will follow-up with doctors outpatient.  Pt pulled out IV access. MD Cobian at bedside and aware. Pt pulled out IV access. MD Cobian to prepare DC instructions.

## 2023-07-13 NOTE — ED ADULT NURSE REASSESSMENT NOTE - NS ED NURSE REASSESS COMMENT FT1
Pt resting in bed, A&OX3, awaiting blood results. No acute distress noted, denies chest pain, no SOB noted. Pt resting in bed, A&OX3, awaiting blood results. Diabetic ulcers noted to CHINO. MD Cobian aware. No acute distress noted, denies chest pain, no SOB noted. 0854am--Pt resting in bed, A&OX3, awaiting blood results. Diabetic ulcers noted to CHINO. MD Cobian aware. No acute distress noted, denies chest pain, no SOB noted.

## 2023-07-13 NOTE — ED PROVIDER NOTE - OBJECTIVE STATEMENT
78 year old male PMH CABG, DM neuropathy coming in with b/l LE swelling and ulcers to his left third and fourth toes. pt states he saw his doctor last week but they couldn't help him and he doesn't want to go back to see them again. states he also sees a podiatrist but they also don't know how to help his toe ulcers. states he has had LE swelling for a long time. 78 year old male PMH CAD s/p CABG and stents, DM neuropathy, CLL, HTN, HLD, anxiety/depression coming in with b/l LE swelling and ulcers to his left third and fourth toes. pt states he saw his doctor last week but they couldn't help him and he doesn't want to go back to see them again. states he also sees a podiatrist but they also don't know how to help his toe ulcers. states he has had LE swelling for a long time.

## 2023-07-13 NOTE — ED ADULT NURSE NOTE - CAS ELECT INFOMATION PROVIDED
DC instructions provided, pt refused to sign DC paper work. Pt verbalized understanding of teaching/DC instructions

## 2023-07-13 NOTE — ED PROVIDER NOTE - PATIENT PORTAL LINK FT
You can access the FollowMyHealth Patient Portal offered by [facility] by registering at the following website: [email or web address]. By joining [facility]’s FollowMyHealth portal, you will also be able to view your health information using other applications (apps) compatible with our system.

## 2023-07-13 NOTE — ED ADULT NURSE NOTE - NSFALLHARMRISKINTERV_ED_ALL_ED

## 2023-07-13 NOTE — ED PROVIDER NOTE - CLINICAL SUMMARY MEDICAL DECISION MAKING FREE TEXT BOX
78 year old male with LE swelling and ulcers to left 3/4 toes. PE as above.  labs, cxr, ecg, reassess

## 2023-07-13 NOTE — ED ADULT NURSE NOTE - OBJECTIVE STATEMENT
As per patient c/o b/l feet welling. Patient reports having diabetes and living alone. Patient reports needing a  and needing to stay in the hospital to get help. Patient reports needing , as he does not have any family or friends to help him.  Patient denies any chest pain, nausea, vomiting and diarrhea. No acute distress noted.

## 2023-07-17 ENCOUNTER — APPOINTMENT (OUTPATIENT)
Dept: FAMILY MEDICINE | Facility: CLINIC | Age: 78
End: 2023-07-17

## 2023-07-25 NOTE — ED BEHAVIORAL HEALTH ASSESSMENT NOTE - NS ED BHA REVIEW OF ED CHART AVAILABLE INVESTIGATIONS REVIEWED
Chemistry panel shows slightly low sodium at 132 which has been present before over the last several years.  Chloride low at 95.  Needs to water restrict.  Check repeat lites in 2 weeks  Glucose 106.  Kidney function normal.  Liver enzymes normal.  A1c 6.1 indicating good control for diabetes.  Cholesterol 177 improved,  also improved.  CBC normal None available

## 2023-08-01 NOTE — ED POST DISCHARGE NOTE - RESULT SUMMARY
Abnormal appearance 5th toe
Partially impaired: cannot see medication labels or newsprint, but can see obstacles in path, and the surrounding layout; can count fingers at arm's length

## 2023-08-19 NOTE — REVIEW OF SYSTEMS
Problem: Discharge Planning  Goal: Discharge to home or other facility with appropriate resources  8/19/2023 0654 by Luberta Spatz, RN  Outcome: Progressing  8/19/2023 0654 by Luberta Spatz, RN  Outcome: Progressing     Problem: Pain  Goal: Verbalizes/displays adequate comfort level or baseline comfort level  8/19/2023 0654 by Luberta Spatz, RN  Outcome: Progressing  8/19/2023 0654 by Luberta Spatz, RN  Outcome: Progressing     Problem: Safety - Adult  Goal: Free from fall injury  8/19/2023 0654 by Luberta Spatz, RN  Outcome: Progressing  8/19/2023 0654 by Luberta Spatz, RN  Outcome: Progressing     Problem: ABCDS Injury Assessment  Goal: Absence of physical injury  Outcome: Progressing [Ear Drainage] : ear drainage [Nasal Congestion] : nasal congestion [Negative] : Heme/Lymph [Patient Intake Form Reviewed] : Patient intake form was reviewed

## 2023-09-06 ENCOUNTER — INPATIENT (INPATIENT)
Facility: HOSPITAL | Age: 78
LOS: 0 days | Discharge: LEFT BEFORE DISCHARGE | DRG: 872 | End: 2023-09-07
Attending: STUDENT IN AN ORGANIZED HEALTH CARE EDUCATION/TRAINING PROGRAM | Admitting: INTERNAL MEDICINE
Payer: MEDICARE

## 2023-09-06 VITALS
TEMPERATURE: 98 F | SYSTOLIC BLOOD PRESSURE: 111 MMHG | DIASTOLIC BLOOD PRESSURE: 39 MMHG | OXYGEN SATURATION: 97 % | HEART RATE: 93 BPM | HEIGHT: 72 IN | RESPIRATION RATE: 18 BRPM | WEIGHT: 220.02 LBS

## 2023-09-06 DIAGNOSIS — Z00.00 ENCOUNTER FOR GENERAL ADULT MEDICAL EXAMINATION WITHOUT ABNORMAL FINDINGS: ICD-10-CM

## 2023-09-06 DIAGNOSIS — M79.89 OTHER SPECIFIED SOFT TISSUE DISORDERS: ICD-10-CM

## 2023-09-06 DIAGNOSIS — Z95.1 PRESENCE OF AORTOCORONARY BYPASS GRAFT: Chronic | ICD-10-CM

## 2023-09-06 DIAGNOSIS — F25.9 SCHIZOAFFECTIVE DISORDER, UNSPECIFIED: ICD-10-CM

## 2023-09-06 DIAGNOSIS — I10 ESSENTIAL (PRIMARY) HYPERTENSION: ICD-10-CM

## 2023-09-06 DIAGNOSIS — A41.9 SEPSIS, UNSPECIFIED ORGANISM: ICD-10-CM

## 2023-09-06 DIAGNOSIS — E11.9 TYPE 2 DIABETES MELLITUS WITHOUT COMPLICATIONS: ICD-10-CM

## 2023-09-06 DIAGNOSIS — E78.5 HYPERLIPIDEMIA, UNSPECIFIED: ICD-10-CM

## 2023-09-06 DIAGNOSIS — C95.11 CHRONIC LEUKEMIA OF UNSPECIFIED CELL TYPE, IN REMISSION: ICD-10-CM

## 2023-09-06 DIAGNOSIS — L03.031 CELLULITIS OF RIGHT TOE: ICD-10-CM

## 2023-09-06 LAB
ANION GAP SERPL CALC-SCNC: 13 MMOL/L — SIGNIFICANT CHANGE UP (ref 5–17)
BUN SERPL-MCNC: 17 MG/DL — SIGNIFICANT CHANGE UP (ref 7–23)
CALCIUM SERPL-MCNC: 9.5 MG/DL — SIGNIFICANT CHANGE UP (ref 8.4–10.5)
CHLORIDE SERPL-SCNC: 108 MMOL/L — SIGNIFICANT CHANGE UP (ref 96–108)
CO2 SERPL-SCNC: 23 MMOL/L — SIGNIFICANT CHANGE UP (ref 22–31)
CREAT SERPL-MCNC: 0.83 MG/DL — SIGNIFICANT CHANGE UP (ref 0.5–1.3)
CRP SERPL-MCNC: 6.2 MG/L — HIGH (ref 0–4)
EGFR: 91 ML/MIN/1.73M2 — SIGNIFICANT CHANGE UP
ERYTHROCYTE [SEDIMENTATION RATE] IN BLOOD: 7 MM/HR — SIGNIFICANT CHANGE UP
GLUCOSE BLDC GLUCOMTR-MCNC: 160 MG/DL — HIGH (ref 70–99)
GLUCOSE SERPL-MCNC: 104 MG/DL — HIGH (ref 70–99)
HCT VFR BLD CALC: 46.8 % — SIGNIFICANT CHANGE UP (ref 39–50)
HGB BLD-MCNC: 14.8 G/DL — SIGNIFICANT CHANGE UP (ref 13–17)
MCHC RBC-ENTMCNC: 29.2 PG — SIGNIFICANT CHANGE UP (ref 27–34)
MCHC RBC-ENTMCNC: 31.6 GM/DL — LOW (ref 32–36)
MCV RBC AUTO: 92.3 FL — SIGNIFICANT CHANGE UP (ref 80–100)
NRBC # BLD: 0 /100 WBCS — SIGNIFICANT CHANGE UP (ref 0–0)
PLATELET # BLD AUTO: 269 K/UL — SIGNIFICANT CHANGE UP (ref 150–400)
POTASSIUM SERPL-MCNC: 4 MMOL/L — SIGNIFICANT CHANGE UP (ref 3.5–5.3)
POTASSIUM SERPL-SCNC: 4 MMOL/L — SIGNIFICANT CHANGE UP (ref 3.5–5.3)
RBC # BLD: 5.07 M/UL — SIGNIFICANT CHANGE UP (ref 4.2–5.8)
RBC # FLD: 14 % — SIGNIFICANT CHANGE UP (ref 10.3–14.5)
SODIUM SERPL-SCNC: 144 MMOL/L — SIGNIFICANT CHANGE UP (ref 135–145)
WBC # BLD: 13.27 K/UL — HIGH (ref 3.8–10.5)
WBC # FLD AUTO: 13.27 K/UL — HIGH (ref 3.8–10.5)

## 2023-09-06 PROCEDURE — 99223 1ST HOSP IP/OBS HIGH 75: CPT

## 2023-09-06 PROCEDURE — 93971 EXTREMITY STUDY: CPT | Mod: 26,RT

## 2023-09-06 PROCEDURE — 73630 X-RAY EXAM OF FOOT: CPT | Mod: 26,RT

## 2023-09-06 PROCEDURE — 99285 EMERGENCY DEPT VISIT HI MDM: CPT

## 2023-09-06 RX ORDER — QUETIAPINE FUMARATE 200 MG/1
1 TABLET, FILM COATED ORAL
Refills: 0 | DISCHARGE

## 2023-09-06 RX ORDER — METOPROLOL TARTRATE 50 MG
1 TABLET ORAL
Refills: 0 | DISCHARGE

## 2023-09-06 RX ORDER — INSULIN LISPRO 100/ML
VIAL (ML) SUBCUTANEOUS
Refills: 0 | Status: DISCONTINUED | OUTPATIENT
Start: 2023-09-06 | End: 2023-09-07

## 2023-09-06 RX ORDER — VANCOMYCIN HCL 1 G
1500 VIAL (EA) INTRAVENOUS ONCE
Refills: 0 | Status: COMPLETED | OUTPATIENT
Start: 2023-09-06 | End: 2023-09-06

## 2023-09-06 RX ORDER — SODIUM CHLORIDE 9 MG/ML
1000 INJECTION, SOLUTION INTRAVENOUS
Refills: 0 | Status: DISCONTINUED | OUTPATIENT
Start: 2023-09-06 | End: 2023-09-07

## 2023-09-06 RX ORDER — METOPROLOL TARTRATE 50 MG
50 TABLET ORAL DAILY
Refills: 0 | Status: DISCONTINUED | OUTPATIENT
Start: 2023-09-06 | End: 2023-09-07

## 2023-09-06 RX ORDER — KETOROLAC TROMETHAMINE 30 MG/ML
15 SYRINGE (ML) INJECTION ONCE
Refills: 0 | Status: DISCONTINUED | OUTPATIENT
Start: 2023-09-06 | End: 2023-09-06

## 2023-09-06 RX ORDER — LOSARTAN POTASSIUM 100 MG/1
50 TABLET, FILM COATED ORAL DAILY
Refills: 0 | Status: DISCONTINUED | OUTPATIENT
Start: 2023-09-06 | End: 2023-09-07

## 2023-09-06 RX ORDER — QUETIAPINE FUMARATE 200 MG/1
100 TABLET, FILM COATED ORAL AT BEDTIME
Refills: 0 | Status: DISCONTINUED | OUTPATIENT
Start: 2023-09-06 | End: 2023-09-07

## 2023-09-06 RX ORDER — DEXTROSE 50 % IN WATER 50 %
15 SYRINGE (ML) INTRAVENOUS ONCE
Refills: 0 | Status: DISCONTINUED | OUTPATIENT
Start: 2023-09-06 | End: 2023-09-07

## 2023-09-06 RX ORDER — ACETAMINOPHEN 500 MG
650 TABLET ORAL EVERY 6 HOURS
Refills: 0 | Status: DISCONTINUED | OUTPATIENT
Start: 2023-09-06 | End: 2023-09-07

## 2023-09-06 RX ORDER — VANCOMYCIN HCL 1 G
1500 VIAL (EA) INTRAVENOUS EVERY 12 HOURS
Refills: 0 | Status: COMPLETED | OUTPATIENT
Start: 2023-09-06 | End: 2023-09-07

## 2023-09-06 RX ORDER — PIPERACILLIN AND TAZOBACTAM 4; .5 G/20ML; G/20ML
3.38 INJECTION, POWDER, LYOPHILIZED, FOR SOLUTION INTRAVENOUS EVERY 8 HOURS
Refills: 0 | Status: DISCONTINUED | OUTPATIENT
Start: 2023-09-06 | End: 2023-09-07

## 2023-09-06 RX ORDER — LANOLIN ALCOHOL/MO/W.PET/CERES
3 CREAM (GRAM) TOPICAL AT BEDTIME
Refills: 0 | Status: DISCONTINUED | OUTPATIENT
Start: 2023-09-06 | End: 2023-09-07

## 2023-09-06 RX ORDER — FEXOFENADINE HCL 30 MG
1 TABLET ORAL
Refills: 0 | DISCHARGE

## 2023-09-06 RX ORDER — DEXTROSE 50 % IN WATER 50 %
25 SYRINGE (ML) INTRAVENOUS ONCE
Refills: 0 | Status: DISCONTINUED | OUTPATIENT
Start: 2023-09-06 | End: 2023-09-07

## 2023-09-06 RX ORDER — ENOXAPARIN SODIUM 100 MG/ML
40 INJECTION SUBCUTANEOUS EVERY 24 HOURS
Refills: 0 | Status: DISCONTINUED | OUTPATIENT
Start: 2023-09-06 | End: 2023-09-07

## 2023-09-06 RX ORDER — ATORVASTATIN CALCIUM 80 MG/1
40 TABLET, FILM COATED ORAL AT BEDTIME
Refills: 0 | Status: DISCONTINUED | OUTPATIENT
Start: 2023-09-06 | End: 2023-09-07

## 2023-09-06 RX ORDER — GLUCAGON INJECTION, SOLUTION 0.5 MG/.1ML
1 INJECTION, SOLUTION SUBCUTANEOUS ONCE
Refills: 0 | Status: DISCONTINUED | OUTPATIENT
Start: 2023-09-06 | End: 2023-09-07

## 2023-09-06 RX ORDER — PANTOPRAZOLE SODIUM 20 MG/1
1 TABLET, DELAYED RELEASE ORAL
Refills: 0 | DISCHARGE

## 2023-09-06 RX ORDER — LOSARTAN POTASSIUM 100 MG/1
1 TABLET, FILM COATED ORAL
Refills: 0 | DISCHARGE

## 2023-09-06 RX ORDER — ONDANSETRON 8 MG/1
4 TABLET, FILM COATED ORAL EVERY 8 HOURS
Refills: 0 | Status: DISCONTINUED | OUTPATIENT
Start: 2023-09-06 | End: 2023-09-07

## 2023-09-06 RX ORDER — PIPERACILLIN AND TAZOBACTAM 4; .5 G/20ML; G/20ML
3.38 INJECTION, POWDER, LYOPHILIZED, FOR SOLUTION INTRAVENOUS ONCE
Refills: 0 | Status: COMPLETED | OUTPATIENT
Start: 2023-09-06 | End: 2023-09-06

## 2023-09-06 RX ORDER — ROSUVASTATIN CALCIUM 5 MG/1
1 TABLET ORAL
Refills: 0 | DISCHARGE

## 2023-09-06 RX ORDER — DEXTROSE 50 % IN WATER 50 %
12.5 SYRINGE (ML) INTRAVENOUS ONCE
Refills: 0 | Status: DISCONTINUED | OUTPATIENT
Start: 2023-09-06 | End: 2023-09-07

## 2023-09-06 RX ORDER — ISOSORBIDE MONONITRATE 60 MG/1
1 TABLET, EXTENDED RELEASE ORAL
Refills: 0 | DISCHARGE

## 2023-09-06 RX ORDER — RANOLAZINE 500 MG/1
500 TABLET, FILM COATED, EXTENDED RELEASE ORAL
Refills: 0 | DISCHARGE

## 2023-09-06 RX ADMIN — Medication 2: at 22:25

## 2023-09-06 RX ADMIN — PIPERACILLIN AND TAZOBACTAM 25 GRAM(S): 4; .5 INJECTION, POWDER, LYOPHILIZED, FOR SOLUTION INTRAVENOUS at 22:25

## 2023-09-06 RX ADMIN — PIPERACILLIN AND TAZOBACTAM 200 GRAM(S): 4; .5 INJECTION, POWDER, LYOPHILIZED, FOR SOLUTION INTRAVENOUS at 15:32

## 2023-09-06 RX ADMIN — Medication 15 MILLIGRAM(S): at 21:28

## 2023-09-06 RX ADMIN — Medication 300 MILLIGRAM(S): at 16:49

## 2023-09-06 RX ADMIN — ATORVASTATIN CALCIUM 40 MILLIGRAM(S): 80 TABLET, FILM COATED ORAL at 22:25

## 2023-09-06 RX ADMIN — ENOXAPARIN SODIUM 40 MILLIGRAM(S): 100 INJECTION SUBCUTANEOUS at 20:00

## 2023-09-06 RX ADMIN — Medication 15 MILLIGRAM(S): at 20:51

## 2023-09-06 RX ADMIN — QUETIAPINE FUMARATE 100 MILLIGRAM(S): 200 TABLET, FILM COATED ORAL at 22:25

## 2023-09-06 NOTE — H&P ADULT - PROBLEM SELECTOR PLAN 2
Hx of schizoaffective disorder, adjustment disorder w/ anxious mood. Previous records indicate pt on seroquel at home. Pressured, rapid speech, inappropriately loud. Able to redirect. Chronic R toe wound w/ surrounding redness, erythema, warmth, tenderness. Afebrile, vitals WNL. HDS. Mild leukocytosis WBC 13.27. CRP 6.2, ESR 7. Podiatry initially saw pt in the ED who refused evaluation. XR R foot findings concerning for septic arthritis/OM centered at first MTP joint w/ recommendations for f/u MRI. s/p 1 dose of vanc/zosyn in ED.     -Consulted wound care   -Awaiting Podiatry recommendations   -f/u w/ MRI of R foot  -f/u repeat ESR, CRP in AM  -c/w vancomycin/zosyn for OM coverage

## 2023-09-06 NOTE — CHART NOTE - NSCHARTNOTEFT_GEN_A_CORE
Podiatry was consulted on pt. Pt declined podiatric treatment stating that "podiatrists are not real doctors" and that he "was only here to see Dr. Clemons for IV antibiotics". Podiatry signing off. Podiatry was consulted on pt. Pt declined podiatric treatment stating that "podiatrists are not real doctors" and that he "was only here to see Dr. Clemons for IV antibiotics".  Would not allow resident to examen and evaluate. Podiatry signing off. Podiatry was consulted on pt. Pt declined podiatric treatment stating that "podiatrists are not real doctors" and that he "was only here to see Dr. Clemons for IV antibiotics".  Would not allow resident to examined and evaluate. Podiatry signing off. Please reconsult when pt is amendable to podiatric care. Podiatry was consulted on pt. Pt declined podiatric treatment stating that "podiatrists are not real doctors" and that he "was only here to see Dr. Clemons for IV antibiotics". Would not allow resident to examine and evaluate. Podiatry signing off. Please reconsult when pt is amendable to podiatric care.

## 2023-09-06 NOTE — ED PROVIDER NOTE - PROGRESS NOTE DETAILS
Podiatry consulted and will eval.  Pt discussed w pmd, Dr Brown, who requests I admit to Dr Clemons (ACP); pt discussed w Dr Clemons who states the hospitalist [facility] is not allowing him to be the admitting md and to admit to [facility]. Pt refused to see podiatry.  WBC elevated, u/s neg, xray pending.  Will admit.

## 2023-09-06 NOTE — ED ADULT NURSE REASSESSMENT NOTE - NS ED NURSE REASSESS COMMENT FT1
Pt requested to be moved, moved to alternate location, pt yelling using racial slurs r/t roommates family members. Pt states "get me out of here I can't be here with her." Pt informed he cannot use racial slurs and must be respectful, not yell, pt states "I had to." Pt alert and oriented, redirectable once moved to alternate location.

## 2023-09-06 NOTE — H&P ADULT - PROBLEM SELECTOR PLAN 7
-c/w rosuvastatin 10 mg PO QD last dose of chemo completed on 10/19. Follows outpatient w/ Dr. Brown.

## 2023-09-06 NOTE — H&P ADULT - HISTORY OF PRESENT ILLNESS
78y M w/ PMHx CAD s/p CABG, DM w/ neuropathy, CLL, HTN, adjustment disorder w/ anxious mood, schizoaffective p/w R great toe redness, erythema, pain w/ pt unable to recall when wound started. Pt seen by PCP, Dr. Brown today who inspected wound and instructed pt to present to ED. Previous issues w/ leg swelling and leg swelling/wound has decreased his ability to ambulate, can only walk 1 block now d/t leg heaviness, pain. Per pt., he had home health services but dismissed them d/t them "not doing anything". Reports chest pain associated w/ anxiety which he takes nitro for. Otherwise denies fever, SOB, abdominal pain, N/V. Normal appetite.

## 2023-09-06 NOTE — ED ADULT TRIAGE NOTE - CHIEF COMPLAINT QUOTE
pt walked in sent by PCP for cellulitis in the right lower extremity, for IV abx treatment. Pt ambulating with a steady gait with a cane. pt denies any fever, chills, N/V/D, CP, or SOB.

## 2023-09-06 NOTE — H&P ADULT - PROBLEM SELECTOR PLAN 6
last dose of chemo completed on 10/19. Follows outpatient w/ Dr. Brown. -c/w toprol 50 mg PO QD   -c/w losartan 50 mg PO QD

## 2023-09-06 NOTE — ED PROVIDER NOTE - PHYSICAL EXAMINATION
VITAL SIGNS: I have reviewed nursing notes and confirm.  CONSTITUTIONAL:  in no acute distress.   SKIN:  warm and dry, no acute rash.   HEAD:  normocephalic, atraumatic.  EYES: PERRL, EOM intact; conjunctiva and sclera clear.  ENT: No nasal discharge; airway clear.   NECK: Supple; non tender.  CARD: S1, S2 normal; no murmurs, gallops, or rubs. Regular rate and rhythm.   RESP:  Clear to auscultation b/l, no wheezes, rales or rhonchi.  ABD: Normal bowel sounds; soft; non-distended; non-tender; no guarding/ rebound.  MSK: Normal ROM. No clubbing, cyanosis or edema. RLE - 2 cm ulcer top of great toe w/o drainage, erythema/swelling great toe w warmth, ttp; erythema extends to dorsum of foot to mtp area toes 1 and 2, fungal changes between all toes, + ttp foot, edema foot to knee, dp 1+,   NEURO: Alert, oriented, grossly unremarkable  PSYCH: Cooperative, mood and affect appropriate.

## 2023-09-06 NOTE — H&P ADULT - NSHPPHYSICALEXAM_GEN_ALL_CORE
GENERAL: NAD, lying in bed comfortably  HEAD:  Atraumatic, normocephalic  EYES: EOMI, PERRLA, conjunctiva and sclera clear  NECK: Supple, trachea midline, no JVD  HEART: Regular rate and rhythm, no murmurs, rubs, or gallops  LUNGS: Unlabored respirations.  Clear to auscultation bilaterally, no crackles, wheezing, or rhonchi  ABDOMEN: Soft, nontender, nondistended, +BS  EXTREMITIES: 2+ peripheral pulses bilaterally. No clubbing, cyanosis, or edema  NERVOUS SYSTEM:  A&Ox3, moving all extremities, no focal deficits   SKIN: No rashes or lesions GENERAL: NAD, lying in bed comfortably  EYES: EOMI, PERRLA, conjunctiva and sclera clear  NECK: Supple, trachea midline, no JVD  HEART: Regular rate and rhythm. murmur   LUNGS: Coarse breath sounds, no crackles, wheezing, or rhonchi  ABDOMEN: Soft, mild generalized tenderness, mildly distended  EXTREMITIES: +1 LE pitting edema b/l, chronic wound on R great toe w/ surrounding redness, erythema, tenderness   NERVOUS SYSTEM:  A&Ox3, moving all extremities, no focal deficits, pressured speech   SKIN: No rashes or lesions

## 2023-09-06 NOTE — ED ADULT NURSE NOTE - OBJECTIVE STATEMENT
78y M pmHx DM, CLL, presents to ED c/o cellulitis to RLE with associated leg swelling, pain to bilateral LE sent by PCP for IV abx. Pt reports he has venous insufficiency in bilateral LE with worsening swelling, cellulitis x months to RLE with some redness/suspected cellulitis to LLE. Endorses pain, swelling, difficulty walking r/t swelling/heaviness, numbness/tingling to BL feet. Denies CP/SOB, fever/chills, N/V. Pt ambulates with cane at home but reports multiple falls. Also c/o unsafety at home pt states "its a racial thing I'm not getting into it with you I just want to speak to a ." Social work aware. Pt Aaox4, speaking in full and clear sentences, NAD at this time. 78y M pmHx DM, CLL, presents to ED c/o cellulitis to RLE with associated leg swelling, pain to bilateral LE sent by PCP for IV abx. Pt reports he has venous insufficiency in bilateral LE with worsening swelling, cellulitis x months to RLE with some redness/suspected cellulitis to LLE. Endorses pain, swelling, difficulty walking r/t swelling/heaviness, numbness/tingling to BL feet. Denies CP/SOB, fever/chills, N/V. Pt ambulates with cane at home but reports multiple falls. Also c/o unsafety at home pt states "its a racial thing I'm not getting into it with you I just want to speak to a ." Social work aware. Pt alert and awake, oriented to self and place but refuses to answer questions regarding time/situation, stating "the patient bill of rights says I don't have to answer." Pt speaking in full and clear sentences, NAD at this time. 78y M pmHx DM, CLL, presents to ED c/o cellulitis to RLE with associated leg swelling, pain to bilateral LE sent by PCP for IV abx. Pt reports he has venous insufficiency in bilateral LE with worsening swelling, cellulitis x months to RLE with some redness/suspected cellulitis to LLE. Endorses pain, swelling, difficulty walking r/t swelling/heaviness, numbness/tingling to BL feet. Edema noted to BLLE, wound to R first toe. No bleeding/drainage at the site. No other redness, streaking noted to BLLE. Denies CP/SOB, fever/chills, N/V. Pt ambulates with cane at home but reports multiple falls. Also c/o unsafety at home pt states "its a racial thing I'm not getting into it with you I just want to speak to a ." Social work aware. Pt alert and awake, oriented to self and place but refuses to answer questions regarding time/situation, stating "the patient bill of rights says I don't have to answer." Pt speaking in full and clear sentences, NAD at this time.

## 2023-09-06 NOTE — ED ADULT NURSE REASSESSMENT NOTE - NS ED NURSE REASSESS COMMENT FT1
Pt DASA form total >4 r/t agitation, outbursts throughout day. ANM aware and huddle completed with ANM, [facility] ANM, security, AND, resident.

## 2023-09-06 NOTE — ED ADULT NURSE REASSESSMENT NOTE - NS ED NURSE REASSESS COMMENT FT1
Pt yelling, refusing to answer questions, but redirectable at this time. Pt reassured by updating re: plan of care. Pt AAOx4, able to answer full orientation assessment questions at this time. Breathing spontaneous regular and unlabored.

## 2023-09-06 NOTE — PATIENT PROFILE ADULT - FALL HARM RISK - HARM RISK INTERVENTIONS
Assistance with ambulation/Assistance OOB with selected safe patient handling equipment/Communicate Risk of Fall with Harm to all staff/Discuss with provider need for PT consult/Monitor gait and stability/Provide patient with walking aids - walker, cane, crutches/Reinforce activity limits and safety measures with patient and family/Tailored Fall Risk Interventions/Toileting schedule using arm’s reach rule for commode and bathroom/Visual Cue: Yellow wristband and red socks/Bed in lowest position, wheels locked, appropriate side rails in place/Call bell, personal items and telephone in reach/Instruct patient to call for assistance before getting out of bed or chair/Non-slip footwear when patient is out of bed/[place] to call system/Physically safe environment - no spills, clutter or unnecessary equipment/Purposeful Proactive Rounding/Room/bathroom lighting operational, light cord in reach

## 2023-09-06 NOTE — ED ADULT NURSE NOTE - NSFALLRISKINTERV_ED_ALL_ED

## 2023-09-06 NOTE — H&P ADULT - PROBLEM SELECTOR PLAN 4
Diabetic neuropathy w/ loss of sensation. On metformin, sitagliptin for home regimen. Per endocrine note 2 years prio, pt does not self monitor glucose at home and is unable to see glucometer or lancets d/t vision issues.     -mISS   -Recheck  Hgb A1C  -consult Endocrinology in AM Chronic b/l le +1 pitting edema. Difficulty w/ ambulation w/ cane. Likely 2/2 to diabetic neuropathy. U/S R LE shows no DVT. Extremities cold to touch.

## 2023-09-06 NOTE — H&P ADULT - PROBLEM SELECTOR PLAN 5
-c/w toprol 50 mg PO QD   -c/w losartan 50 mg PO QD Diabetic neuropathy w/ loss of sensation. On metformin, sitagliptin for home regimen. Per endocrine note 2 years prio, pt does not self monitor glucose at home and is unable to see glucometer or lancets d/t vision issues.     -mISS   -Recheck  Hgb A1C

## 2023-09-06 NOTE — H&P ADULT - PROBLEM SELECTOR PLAN 1
Chronic R toe wound w/ surrounding redness, erythema, warmth, tenderness. Afebrile, vitals WNL. HDS. Mild leukocytosis WBC 13.27. CRP 6.2, ESR 7. Podiatry initially saw pt in the ED who refused evaluation. XR R foot findings concerning for septic arthritis/OM centered at first MTP joint w/ recommendations for f/u MRI. s/p 1 dose of vanc/zosyn in ED.     -wound care  -f/u w/ MRI of R foot   -f/u w/ Podiatry consult in AM  -c/w vancomycin/zosyn for OM coverage Met 2/4 sepsis criteria. WBC 13.27. HR 93 w/ source R great toe cellulitis. s/p 1 dose vanc/zosyn in ED.

## 2023-09-06 NOTE — H&P ADULT - PROBLEM SELECTOR PLAN 3
Chronic b/l le +1 pitting edema. Difficulty w/ ambulation w/ cane. Likely 2/2 to diabetic neuropathy. U/S R LE shows no DVT. Extremities cold to touch. Hx of schizoaffective disorder, adjustment disorder w/ anxious mood. Previous records indicate pt on seroquel at home. Pressured, rapid speech, inappropriately loud. Able to redirect.    -c/w seroquel 100 mg QHS

## 2023-09-06 NOTE — H&P ADULT - ATTENDING COMMENTS
77 yo M with PMHx CAD (s/p CABG), DM (c/b peripheral neuropathy), CLL (remission), HTN, adjustment disorder, schizoaffective disorder px from home at urging of outpatient PCP with concern for R toe ulcer admitted for management of sepsis 2/2 R toe osteomyelitis vs septic arthritis.      #Sepsis – Meeting 2/4 SIRS (HR, WBC). On exam, RLE with erythema/pain in R toe with RLE edema slightly larger than LLE. RLE Doppler negative for DVT. XR R foot showing findings concerning for septic arthritis vs osteomyelitis of the 1st R toe. ESR unremarkable, CRP very mildly elevated. Continue Vanc/Zosyn. F/U MRI R foot. Daily ESR/CRP. F/U blood cx, wound cx. Podiatry consulted in ED, F/U recommendations.      Agree with remainder of resident plan as above.

## 2023-09-06 NOTE — ED PROVIDER NOTE - OBJECTIVE STATEMENT
77 yo M h/o CAD s/p CABG and stents, DM neuropathy, CLL, HTN, HLD, anxiety/depression sent by pmd, Dr Brown, for eval, iv abx.  Pt notes wound on R great toe w worsening swelling, redness, pain.  No fever.  No drainage.

## 2023-09-06 NOTE — H&P ADULT - ASSESSMENT
78y M w/ PMHx CAD s/p CABG, DM w/ neuropathy, CLL, HTN, adjustment disorder w/ anxious mood, schizoaffective p/w R great toe redness, erythema, pain surrounding chronic wound w/ concern for osteomyelitis.

## 2023-09-07 VITALS
RESPIRATION RATE: 18 BRPM | OXYGEN SATURATION: 99 % | HEART RATE: 82 BPM | DIASTOLIC BLOOD PRESSURE: 71 MMHG | TEMPERATURE: 97 F | SYSTOLIC BLOOD PRESSURE: 148 MMHG

## 2023-09-07 DIAGNOSIS — Z86.59 PERSONAL HISTORY OF OTHER MENTAL AND BEHAVIORAL DISORDERS: ICD-10-CM

## 2023-09-07 DIAGNOSIS — F41.1 GENERALIZED ANXIETY DISORDER: ICD-10-CM

## 2023-09-07 DIAGNOSIS — F32.9 MAJOR DEPRESSIVE DISORDER, SINGLE EPISODE, UNSPECIFIED: ICD-10-CM

## 2023-09-07 DIAGNOSIS — F79 UNSPECIFIED INTELLECTUAL DISABILITIES: ICD-10-CM

## 2023-09-07 DIAGNOSIS — Z86.16 PERSONAL HISTORY OF COVID-19: ICD-10-CM

## 2023-09-07 LAB
GLUCOSE BLDC GLUCOMTR-MCNC: 127 MG/DL — HIGH (ref 70–99)
GLUCOSE BLDC GLUCOMTR-MCNC: 136 MG/DL — HIGH (ref 70–99)
GLUCOSE BLDC GLUCOMTR-MCNC: 136 MG/DL — HIGH (ref 70–99)

## 2023-09-07 PROCEDURE — 96374 THER/PROPH/DIAG INJ IV PUSH: CPT

## 2023-09-07 PROCEDURE — 99285 EMERGENCY DEPT VISIT HI MDM: CPT | Mod: 25

## 2023-09-07 PROCEDURE — 86140 C-REACTIVE PROTEIN: CPT

## 2023-09-07 PROCEDURE — 73630 X-RAY EXAM OF FOOT: CPT | Mod: 26,LT

## 2023-09-07 PROCEDURE — 73630 X-RAY EXAM OF FOOT: CPT

## 2023-09-07 PROCEDURE — 99233 SBSQ HOSP IP/OBS HIGH 50: CPT | Mod: GC

## 2023-09-07 PROCEDURE — 80048 BASIC METABOLIC PNL TOTAL CA: CPT

## 2023-09-07 PROCEDURE — 82962 GLUCOSE BLOOD TEST: CPT

## 2023-09-07 PROCEDURE — 96375 TX/PRO/DX INJ NEW DRUG ADDON: CPT

## 2023-09-07 PROCEDURE — 85652 RBC SED RATE AUTOMATED: CPT

## 2023-09-07 PROCEDURE — 93971 EXTREMITY STUDY: CPT

## 2023-09-07 PROCEDURE — 85027 COMPLETE CBC AUTOMATED: CPT

## 2023-09-07 PROCEDURE — 36415 COLL VENOUS BLD VENIPUNCTURE: CPT

## 2023-09-07 RX ORDER — OLANZAPINE 15 MG/1
2.5 TABLET, FILM COATED ORAL EVERY 12 HOURS
Refills: 0 | Status: DISCONTINUED | OUTPATIENT
Start: 2023-09-07 | End: 2023-09-07

## 2023-09-07 RX ORDER — CLOPIDOGREL BISULFATE 75 MG/1
75 TABLET, FILM COATED ORAL DAILY
Refills: 0 | Status: DISCONTINUED | OUTPATIENT
Start: 2023-09-07 | End: 2023-09-07

## 2023-09-07 RX ORDER — ASPIRIN/CALCIUM CARB/MAGNESIUM 324 MG
81 TABLET ORAL DAILY
Refills: 0 | Status: DISCONTINUED | OUTPATIENT
Start: 2023-09-07 | End: 2023-09-07

## 2023-09-07 RX ORDER — KETOROLAC TROMETHAMINE 30 MG/ML
15 SYRINGE (ML) INJECTION ONCE
Refills: 0 | Status: DISCONTINUED | OUTPATIENT
Start: 2023-09-07 | End: 2023-09-07

## 2023-09-07 RX ORDER — QUETIAPINE FUMARATE 200 MG/1
200 TABLET, FILM COATED ORAL EVERY 12 HOURS
Refills: 0 | Status: DISCONTINUED | OUTPATIENT
Start: 2023-09-07 | End: 2023-09-07

## 2023-09-07 RX ORDER — OLANZAPINE 15 MG/1
2.5 TABLET, FILM COATED ORAL EVERY 8 HOURS
Refills: 0 | Status: DISCONTINUED | OUTPATIENT
Start: 2023-09-07 | End: 2023-09-07

## 2023-09-07 RX ORDER — ISOSORBIDE MONONITRATE 60 MG/1
60 TABLET, EXTENDED RELEASE ORAL DAILY
Refills: 0 | Status: DISCONTINUED | OUTPATIENT
Start: 2023-09-07 | End: 2023-09-07

## 2023-09-07 RX ORDER — QUETIAPINE FUMARATE 200 MG/1
100 TABLET, FILM COATED ORAL ONCE
Refills: 0 | Status: COMPLETED | OUTPATIENT
Start: 2023-09-07 | End: 2023-09-07

## 2023-09-07 RX ORDER — CLONAZEPAM 1 MG
0.5 TABLET ORAL DAILY
Refills: 0 | Status: DISCONTINUED | OUTPATIENT
Start: 2023-09-07 | End: 2023-09-07

## 2023-09-07 RX ADMIN — Medication 650 MILLIGRAM(S): at 11:54

## 2023-09-07 RX ADMIN — Medication 300 MILLIGRAM(S): at 18:00

## 2023-09-07 RX ADMIN — Medication 300 MILLIGRAM(S): at 05:02

## 2023-09-07 RX ADMIN — Medication 81 MILLIGRAM(S): at 11:55

## 2023-09-07 RX ADMIN — Medication 0.5 MILLIGRAM(S): at 16:12

## 2023-09-07 RX ADMIN — QUETIAPINE FUMARATE 200 MILLIGRAM(S): 200 TABLET, FILM COATED ORAL at 19:21

## 2023-09-07 RX ADMIN — LOSARTAN POTASSIUM 50 MILLIGRAM(S): 100 TABLET, FILM COATED ORAL at 05:22

## 2023-09-07 RX ADMIN — PIPERACILLIN AND TAZOBACTAM 25 GRAM(S): 4; .5 INJECTION, POWDER, LYOPHILIZED, FOR SOLUTION INTRAVENOUS at 06:53

## 2023-09-07 RX ADMIN — Medication 30 MILLILITER(S): at 05:22

## 2023-09-07 RX ADMIN — CLOPIDOGREL BISULFATE 75 MILLIGRAM(S): 75 TABLET, FILM COATED ORAL at 11:55

## 2023-09-07 RX ADMIN — QUETIAPINE FUMARATE 100 MILLIGRAM(S): 200 TABLET, FILM COATED ORAL at 09:33

## 2023-09-07 RX ADMIN — PIPERACILLIN AND TAZOBACTAM 25 GRAM(S): 4; .5 INJECTION, POWDER, LYOPHILIZED, FOR SOLUTION INTRAVENOUS at 13:04

## 2023-09-07 RX ADMIN — Medication 50 MILLIGRAM(S): at 05:22

## 2023-09-07 RX ADMIN — Medication 650 MILLIGRAM(S): at 20:18

## 2023-09-07 RX ADMIN — ISOSORBIDE MONONITRATE 60 MILLIGRAM(S): 60 TABLET, EXTENDED RELEASE ORAL at 11:55

## 2023-09-07 RX ADMIN — Medication 650 MILLIGRAM(S): at 19:18

## 2023-09-07 RX ADMIN — Medication 15 MILLIGRAM(S): at 05:28

## 2023-09-07 RX ADMIN — ENOXAPARIN SODIUM 40 MILLIGRAM(S): 100 INJECTION SUBCUTANEOUS at 17:59

## 2023-09-07 RX ADMIN — Medication 15 MILLIGRAM(S): at 04:44

## 2023-09-07 RX ADMIN — Medication 650 MILLIGRAM(S): at 12:54

## 2023-09-07 NOTE — CHART NOTE - NSCHARTNOTEFT_GEN_A_CORE
Called for agitation and patient trying to leave AMA. Met patient bedside, found patient to be dressed and walking in room. Loud, agitated, upset that MRI has not happened, saying he is leaving the hospital to go to a different hospital. Explained that patient is in hospital getting IV antibiotics and risk of untreated infection to patient who verbalized understanding. AMA paper signed and placed in chart. Patient said he is going to a different hospital. Called for agitation and patient trying to leave AMA. Met patient bedside, found patient to be dressed and walking in room. Loud, agitated, upset that MRI has not happened, saying he is leaving the hospital to go to a different hospital. Explained that patient is in hospital getting IV antibiotics and risk of untreated infection to patient who verbalized understanding. AMA paper signed and placed in chart. Patient said he is going to a different hospital. Encouraged patient to seek medical care.

## 2023-09-07 NOTE — PROGRESS NOTE ADULT - PROBLEM SELECTOR PLAN 4
Chronic b/l le +1 pitting edema. Difficulty w/ ambulation w/ cane. Likely 2/2 to diabetic neuropathy. U/S R LE shows no DVT. Extremities cold to touch.

## 2023-09-07 NOTE — BH CONSULTATION LIAISON ASSESSMENT NOTE - NSBHATTESTCOMMENTATTENDFT_PSY_A_CORE
79yo man, single, domiciled, unemployed, with a documented history of schizophrenia v. schizoaffective disorder, anxiety disorder, ?of intellectual disability, DMT2, CLL, SUSY,  CAD s/p CABG, Type 2 DM, HTN, diabetic neuropathy, obesity, spinal stenosis, many documented past psychiatric admissions including to [facility] (last known 2014), who presents with irritability, mild paranoia, and periods of verbal agitation in the setting of hospitalization for management of suspected osteomyelitis. Psychiatry consulted re: med management. Pt has been receiving standing quetiapine 100mg QHS and received additional quetiapine 100mg PRN earlier this morning prior to evaluation for verbal agitation.    On interview, pt found awake, calm, with overproductive speech, mildly irritable affect, suspicious about intentions of CL team and guarded about providing history. TP circumstantial, TC grossly reality-based, +suspiciousness about care but no clear voiced delusions, no SI or HI, focused on chronic difficulties with  and ?APS case as outpt. Reports use of Seroquel 200mg BID for mood stabilization and Klonopin 1-2mg/day for dystonia. Insight into medical illness fair based on limited evaluation, unclear insight into psychiatric illness. Fair judgment given current agreeableness to recommended care for osteomyelitis. Not cooperative with cognitive assessment.     Current impression is chronic schizoaffective d/o and suspected intellectual disability, without current acute decompensation. Mild adjustment sx. Low frustration tolerance and misperceptions about care, without acute decompensated psychosis, no [place] or depression. No current acute decisional conflict.  Recommend: Confirm home medications and resume for psychotic disorder with current irritability and possible mild paranoia – per pt, quetiapine 200mg BID and clonazepam 0.5mg (confirmed on ISTOP) for dystonia - hold for oversedation. PRN quetiapine 50mg Q8H for acute agitation; can use olanzapine 2.5mg IM Q8H for severe agitation if oral medication refused. Collateral re: psychiatric baseline and any current outpt treatment. Frequent, simple reminders about care plan given intellectual limitations. Involve NOK in care if any appropriate surrogate decision-maker identified. No current indication for psychiatric admission or barrier to dc when medically ready.

## 2023-09-07 NOTE — CONSULT NOTE ADULT - SUBJECTIVE AND OBJECTIVE BOX
Patient is a 78y old  Male who presents with a chief complaint of Cellulitis (07 Sep 2023 08:33)      HPI:  78y M w/ PMHx CAD s/p CABG, DM w/ neuropathy, CLL, HTN, adjustment disorder w/ anxious mood, schizoaffective p/w R great toe redness, erythema, pain w/ pt unable to recall when wound started. Pt seen by PCP, Dr. Brown today who inspected wound and instructed pt to present to ED. Previous issues w/ leg swelling and leg swelling/wound has decreased his ability to ambulate, can only walk 1 block now d/t leg heaviness, pain. Per pt., he had home health services but dismissed them d/t them "not doing anything". Reports chest pain associated w/ anxiety which he takes nitro for. Otherwise denies fever, SOB, abdominal pain, N/V. Normal appetite.  (06 Sep 2023 17:10)    PAST MEDICAL & SURGICAL HISTORY:  Hypertension      Sleep apnea, unspecified type      Chronic leukemia in remission      DM (diabetes mellitus)  Type 2      HLD (hyperlipidemia)      S/P CABG (coronary artery bypass graft)        MEDICATIONS  (STANDING):  atorvastatin 40 milliGRAM(s) Oral at bedtime  dextrose 5%. 1000 milliLiter(s) (50 mL/Hr) IV Continuous <Continuous>  dextrose 5%. 1000 milliLiter(s) (100 mL/Hr) IV Continuous <Continuous>  dextrose 50% Injectable 25 Gram(s) IV Push once  dextrose 50% Injectable 12.5 Gram(s) IV Push once  dextrose 50% Injectable 25 Gram(s) IV Push once  enoxaparin Injectable 40 milliGRAM(s) SubCutaneous every 24 hours  glucagon  Injectable 1 milliGRAM(s) IntraMuscular once  insulin lispro (ADMELOG) corrective regimen sliding scale   SubCutaneous Before meals and at bedtime  losartan 50 milliGRAM(s) Oral daily  metoprolol succinate ER 50 milliGRAM(s) Oral daily  piperacillin/tazobactam IVPB.. 3.375 Gram(s) IV Intermittent every 8 hours  QUEtiapine 100 milliGRAM(s) Oral at bedtime  vancomycin  IVPB 1500 milliGRAM(s) IV Intermittent every 12 hours    MEDICATIONS  (PRN):  acetaminophen     Tablet .. 650 milliGRAM(s) Oral every 6 hours PRN Temp greater or equal to 38C (100.4F), Mild Pain (1 - 3)  aluminum hydroxide/magnesium hydroxide/simethicone Suspension 30 milliLiter(s) Oral every 4 hours PRN Dyspepsia  dextrose Oral Gel 15 Gram(s) Oral once PRN Blood Glucose LESS THAN 70 milliGRAM(s)/deciliter  melatonin 3 milliGRAM(s) Oral at bedtime PRN Insomnia  ondansetron Injectable 4 milliGRAM(s) IV Push every 8 hours PRN Nausea and/or Vomiting              FAMILY HISTORY:  No pertinent family history in first degree relatives        CBC Full  -  ( 06 Sep 2023 14:24 )  WBC Count : 13.27 K/uL  RBC Count : 5.07 M/uL  Hemoglobin : 14.8 g/dL  Hematocrit : 46.8 %  Platelet Count - Automated : 269 K/uL  Mean Cell Volume : 92.3 fl  Mean Cell Hemoglobin : 29.2 pg  Mean Cell Hemoglobin Concentration : 31.6 gm/dL  Auto Neutrophil # : x  Auto Lymphocyte # : x  Auto Monocyte # : x  Auto Eosinophil # : x  Auto Basophil # : x  Auto Neutrophil % : x  Auto Lymphocyte % : x  Auto Monocyte % : x  Auto Eosinophil % : x  Auto Basophil % : x      09-06    144  |  108  |  17  ----------------------------<  104<H>  4.0   |  23  |  0.83    Ca    9.5      06 Sep 2023 14:24        Urinalysis Basic - ( 06 Sep 2023 14:24 )    Color: x / Appearance: x / SG: x / pH: x  Gluc: 104 mg/dL / Ketone: x  / Bili: x / Urobili: x   Blood: x / Protein: x / Nitrite: x   Leuk Esterase: x / RBC: x / WBC x   Sq Epi: x / Non Sq Epi: x / Bacteria: x        Radiology :     < from: Xray Foot AP + Lateral + Oblique, Right (09.06.23 @ 17:04) >  ACC: [id number] EXAM:  XR FOOT COMP MIN 3 VIEWS RT   ORDERED BY: YAS DHILLON   DIRECTOR     PROCEDURE DATE:  09/06/2023          INTERPRETATION:  EXAMINATION: XR FOOT 3 VIEWS RIGHT    CLINICAL INDICATION:foot infection great toe    COMPARISON: None    TECHNIQUE: Right foot, 3 views    INTERPRETATION: There is extensive lucency centered at the first MTP   joint raising concern for septic arthritis/osteomyelitis. There is   notable sclerosis within the proximal phalanx as well.     There is a healed fracture deformity of the first and fourth metatarsal   distal shaft/neck. The midfoot alignment is maintained. Scattered bony   proliferative changes are present. There is chronic-appearing deformity   of the fifth digit distal phalanx and second digit distal tuft. There are   surgical clips abutting the medial malleolus. There is Achilles   insertional enthesopathy and a plantar calcaneal spur. Vascular   atherosclerotic calcifications are present.    IMPRESSION: Findings concerning for septic arthritis/osteomyelitis   centered at the first MTP joint. Advise further evaluation/workup with   forefoot MRI. Additional chronic findings as above.                Review of Systems : per HPI         Vital Signs Last 24 Hrs  T(C): 36.4 (07 Sep 2023 05:15), Max: 36.6 (06 Sep 2023 12:34)  T(F): 97.5 (07 Sep 2023 05:15), Max: 97.9 (06 Sep 2023 12:34)  HR: 84 (07 Sep 2023 05:15) (78 - 93)  BP: 152/81 (07 Sep 2023 05:15) (111/39 - 155/65)  BP(mean): --  RR: 18 (07 Sep 2023 05:15) (18 - 18)  SpO2: 97% (07 Sep 2023 05:15) (97% - 100%)    Parameters below as of 07 Sep 2023 05:15  Patient On (Oxygen Delivery Method): room air            Physical Exam :  overweight 78 y o man lying comfortably in semi Yousif's position , awake , alert , no acute complaints     Head : normocephalic , atraumatic    Eyes : PERRLA , EOMI , no nystagmus , sclera anicteric    ENT / FACE : neg nasal discharge , uvula midline , no oropharyngeal erythema / exudate    Neck : supple , negative JVD , negative carotid bruits , no thyromegaly    Chest : CTA bilaterally , neg wheeze / rhonchi / rales / crackles / egophany    Cardiovascular : regular rate and rhythm , neg murmurs / rubs / gallops    Abdomen : soft , non distended , non tender to palpation in all 4 quadrants ,  normal bowel sounds     Extremities :  1 + LE edema, chronic appearing R I toe wound /erythema     Neurologic Exam :    Alert and oriented x 3     Motor Exam:          Right UE:               no focal weakness ,  > 3+/5 throughout     Left UE:                 no focal weakness ,  > 3+/5 throughout         Right LE:    no focal weakness ,  > 3+/5 throughout        Left LE:    no focal weakness ,  > 3+/5 throughout         Sensation :         intact to light touch x 4 extremities     DTR :     biceps/brachioradialis : equal                      patella/ankle : equal        Gait :  not tested          PM&R Impression :     admitted for c/o  R great toe redness, erythema, pain surrounding chronic wound w/ concern for osteomyelitis.        Recommendations / Plan :      1) Physical / Occupational therapy focusing on therapeutic exercises , equipment evaluation , bed mobility/transfer out of bed evaluation , progressive ambulation with assistive devices prn .    2) Current disposition plan recommendation  :    pending functional progress

## 2023-09-07 NOTE — DIETITIAN INITIAL EVALUATION ADULT - PROBLEM SELECTOR PLAN 1
Met 2/4 sepsis criteria. WBC 13.27. HR 93 w/ source R great toe cellulitis. s/p 1 dose vanc/zosyn in ED.

## 2023-09-07 NOTE — PROGRESS NOTE ADULT - PROBLEM SELECTOR PLAN 1
Met 2/4 sepsis criteria. WBC 13.27. HR 93 w/ source R great toe cellulitis. s/p 1 dose vanc/zosyn in ED. Met 2/4 sepsis criteria. WBC 13.27. HR 93 w/ source R great toe cellulitis. s/p 1 dose vanc/zosyn in ED.  - c/w vanc 1500mg q12, zosyn 3.375g q8

## 2023-09-07 NOTE — PROGRESS NOTE ADULT - PROBLEM SELECTOR PLAN 3
Hx of schizoaffective disorder, adjustment disorder w/ anxious mood. Previous records indicate pt on seroquel at home. Pressured, rapid speech, inappropriately loud. Able to redirect.    -c/w seroquel 100 mg QHS Hx of schizoaffective disorder, adjustment disorder w/ anxious mood. Previous records indicate pt on seroquel at home. Pressured, rapid speech, inappropriately loud. Able to redirect. Psy was consulted and will f/u recs   - start Seroquel 200mg BID  - Klonopin 0.5mg daily for dystonia  - Seroquel 50mg PO bid PRN for anxiety/agitation, if refusing PO, can use Zyprexa 10mg IM QDaily

## 2023-09-07 NOTE — BH CONSULTATION LIAISON ASSESSMENT NOTE - CURRENT MEDICATION
MEDICATIONS  (STANDING):  atorvastatin 40 milliGRAM(s) Oral at bedtime  dextrose 5%. 1000 milliLiter(s) (50 mL/Hr) IV Continuous <Continuous>  dextrose 5%. 1000 milliLiter(s) (100 mL/Hr) IV Continuous <Continuous>  dextrose 50% Injectable 12.5 Gram(s) IV Push once  dextrose 50% Injectable 25 Gram(s) IV Push once  dextrose 50% Injectable 25 Gram(s) IV Push once  enoxaparin Injectable 40 milliGRAM(s) SubCutaneous every 24 hours  glucagon  Injectable 1 milliGRAM(s) IntraMuscular once  insulin lispro (ADMELOG) corrective regimen sliding scale   SubCutaneous Before meals and at bedtime  losartan 50 milliGRAM(s) Oral daily  metoprolol succinate ER 50 milliGRAM(s) Oral daily  piperacillin/tazobactam IVPB.. 3.375 Gram(s) IV Intermittent every 8 hours  QUEtiapine 100 milliGRAM(s) Oral at bedtime  vancomycin  IVPB 1500 milliGRAM(s) IV Intermittent every 12 hours    MEDICATIONS  (PRN):  acetaminophen     Tablet .. 650 milliGRAM(s) Oral every 6 hours PRN Temp greater or equal to 38C (100.4F), Mild Pain (1 - 3)  aluminum hydroxide/magnesium hydroxide/simethicone Suspension 30 milliLiter(s) Oral every 4 hours PRN Dyspepsia  dextrose Oral Gel 15 Gram(s) Oral once PRN Blood Glucose LESS THAN 70 milliGRAM(s)/deciliter  melatonin 3 milliGRAM(s) Oral at bedtime PRN Insomnia  OLANZapine Injectable 2.5 milliGRAM(s) IntraMuscular every 12 hours PRN anxiety/agitation  ondansetron Injectable 4 milliGRAM(s) IV Push every 8 hours PRN Nausea and/or Vomiting

## 2023-09-07 NOTE — DIETITIAN INITIAL EVALUATION ADULT - PROBLEM SELECTOR PLAN 3
Hx of schizoaffective disorder, adjustment disorder w/ anxious mood. Previous records indicate pt on seroquel at home. Pressured, rapid speech, inappropriately loud. Able to redirect.    -c/w seroquel 100 mg QHS

## 2023-09-07 NOTE — CONSULT NOTE ADULT - ASSESSMENT
78y M w/ PMHx CAD s/p CABG, DM w/ neuropathy, CLL, HTN, adjustment disorder w/ anxious mood, schizoaffective p/w R great toe redness, erythema, pain w/ pt unable to recall when wound started. At the time of consult, VSS 13.27(9/6, 9/7 WBC pending), ESR 7 CRP 6.2.       - R foot XR reviewed   - Recommend L foot XR to evaluate new onset of cellulitis vs. OM vs recent trauma    - Recommend possible vascular consult to evaluate cold L foot.   - MRI needed to evaluate extent of   - Monitor erythema progression   - C/w IV antibiotics   -Pt dressed with dry 4x8 gauze, and tex. Pt refused ACE wrap.  -Rest of care up to primary team    Podiatry following, Plan d/w attending   78y M w/ PMHx CAD s/p CABG, DM w/ neuropathy, CLL, HTN, adjustment disorder w/ anxious mood, schizoaffective p/w R great toe redness, erythema, pain w/ pt unable to recall when wound started. At the time of consult, VSS 13.27(9/6, 9/7 WBC pending), ESR 7 CRP 6.2.       - R foot XR reviewed   - Recommend L foot XR to evaluate new onset of cellulitis vs. OM vs recent trauma    - Recommend possible vascular consult to evaluate cold L foot.   - MRI needed to evaluate extent of cellulitis   - Juancarlos out borders of cellulitis with surgical marking pen. Will continue to monitor erythema progression   - C/w IV antibiotics   -Pt dressed with dry 4x8 gauze, and tex. Pt refused ACE wrap.  -Rest of care up to primary team    Podiatry following, Plan d/w attending   78y M w/ PMHx CAD s/p CABG, DM w/ neuropathy, CLL, HTN, adjustment disorder w/ anxious mood, schizoaffective p/w R great toe redness, erythema, pain w/ pt unable to recall when wound started. At the time of consult, VSS 13.27(9/6, 9/7 WBC pending), ESR 7 CRP 6.2.       - R foot XR reviewed   - Recommend L foot XR to evaluate new onset of cellulitis vs. recent trauma    - Juancarlos out borders of cellulitis with surgical marking pen. Will continue to monitor erythema progression   - C/w IV antibiotics for 1-2 days.    - Pt dressed with dry 4x8 gauze, and tex. Pt refused ACE wrap.  -Rest of care up to primary team    Podiatry following, Plan d/w attending   78y M w/ PMHx CAD s/p CABG, DM w/ neuropathy, CLL, HTN, adjustment disorder w/ anxious mood, schizoaffective p/w R great toe redness, erythema, pain w/ pt unable to recall when wound started. At the time of consult, VSS 13.27(9/6, 9/7 WBC pending), ESR 7 CRP 6.2.       - B/l foot XR reviewed   - Juancarlos out borders of cellulitis with surgical marking pen. Will continue to monitor erythema progression   - C/w IV antibiotics for 1-2 days.    - Pt dressed with dry 4x8 gauze, and tex. Pt refused ACE wrap.  -Rest of care up to primary team    Podiatry following, Plan d/w attending   78y M w/ PMHx CAD s/p CABG, DM w/ neuropathy, CLL, HTN, adjustment disorder w/ anxious mood, schizoaffective p/w R great toe redness, erythema, pain w/ pt unable to recall when wound started. At the time of consult, VSS 13.27(9/6, 9/7 WBC pending), ESR 7 CRP 6.2.       - B/l foot XR reviewed   - Will defer MRI at this time- XR findings likely due to previous implant.   - Juancarlos out borders of cellulitis with surgical marking pen. Will continue to monitor erythema progression   - C/w IV antibiotics for 1-2 days.    - Pt dressed with dry 4x8 gauze, and tex. Pt refused ACE wrap.  -Rest of care up to primary team    Podiatry following, Plan d/w attending

## 2023-09-07 NOTE — DIETITIAN INITIAL EVALUATION ADULT - OTHER INFO
78y M w/ PMHx CAD s/p CABG, DM neuropathy, CLL, HTN, adjustment disorder w/ anxious mood, schizoaffective p/w R great toe redness, erythema, pain surround chronic diabetic ulcer w/ concern for OM. 9/7 psych consulted. Visited pt at bedside this am on 7UR. On assessment pt is awake and alert, however not amenable to interview. Reports "not eating well" pta and does not want to be seen by RD. Consumes breakfast upon visit; endorses feeling hungry as "he didn't eat for 48hrs". Denies ethnic/Buddhism/cultural preferences. Does not want to receive ONS given dislike. Unable to obtain wt hx given not amenable to interview. No overt fat/muscle wasting noted. Skin with abrasion to R first toe, diabetic ulcer to RL foot, PU stg II to sacrum; edema 2+ to L leg and L ankle; edema 4+ to R leg and R knee; liz scale 16. Nutrition related labs reviewed; FSBG 136, 160- insulin regimen ordered. Endorses constipation; reports that team will add bowel regimen. Denies n/v. Denies difficult chew/swallow. Endorses mild pain on assessment. Please view full recs below. RD to remain available

## 2023-09-07 NOTE — DIETITIAN INITIAL EVALUATION ADULT - NSFNSPHYEXAMSKINFT_GEN_A_CORE
Pressure Injury 1: sacrum, Stage II  Pressure Injury 2: none, none  Pressure Injury 3: none, none  Pressure Injury 4: none, none  Pressure Injury 5: none, none  Pressure Injury 6: none, none  Pressure Injury 7: none, none  Pressure Injury 8: none, none  Pressure Injury 9: none, none  Pressure Injury 10: none, none  Pressure Injury 11: none, none, Pressure Injury 1: sacrum, Stage II  Pressure Injury 2: none, none  Pressure Injury 3: none, none  Pressure Injury 4: none, none  Pressure Injury 5: none, none  Pressure Injury 6: none, none  Pressure Injury 7: none, none  Pressure Injury 8: none, none  Pressure Injury 9: none, none  Pressure Injury 10: none, none  Pressure Injury 11: none, none, Pressure Injury 1: sacrum, Stage II  Pressure Injury 2: none, none  Pressure Injury 3: none, none  Pressure Injury 4: none, none  Pressure Injury 5: none, none  Pressure Injury 6: none, none  Pressure Injury 7: none, none  Pressure Injury 8: none, none  Pressure Injury 9: none, none  Pressure Injury 10: none, none  Pressure Injury 11: none, none

## 2023-09-07 NOTE — CONSULT NOTE ADULT - ASSESSMENT
I M    78 y o M w/ PMHx CAD s/p CABG, DM w/ neuropathy, CLL, HTN, adjustment disorder w/ anxious mood, schizoaffective p/w R great toe redness, erythema, pain surrounding chronic wound w/ concern for osteomyelitis.    Problem/Plan - 1:  ·  Problem: Sepsis.   ·  Plan: Met 2/4 sepsis criteria. WBC 13.27. HR 93 w/ source R great toe cellulitis. s/p 1 dose vanc/zosyn in ED.    Problem/Plan - 2:  ·  Problem: Cellulitis of right toe.   ·  Plan: Chronic R toe wound w/ surrounding redness, erythema, warmth, tenderness. Afebrile, vitals WNL. HDS. Mild leukocytosis WBC 13.27. CRP 6.2, ESR 7. Podiatry initially saw pt in the ED who refused evaluation. XR R foot findings concerning for septic arthritis/OM centered at first MTP joint w/ recommendations for f/u MRI. s/p 1 dose of vanc/zosyn in ED.     -Consulted wound care   -Awaiting Podiatry recommendations   -f/u w/ MRI of R foot  -f/u repeat ESR, CRP in AM  -c/w vancomycin/zosyn for OM coverage.    Problem/Plan - 3:  ·  Problem: Schizoaffective disorder.   ·  Plan: Hx of schizoaffective disorder, adjustment disorder w/ anxious mood. Previous records indicate pt on seroquel at home. Pressured, rapid speech, inappropriately loud. Able to redirect.    -c/w seroquel 100 mg QHS.    Problem/Plan - 4:  ·  Problem: Leg swelling.   ·  Plan: Chronic b/l le +1 pitting edema. Difficulty w/ ambulation w/ cane. Likely 2/2 to diabetic neuropathy. U/S R LE shows no DVT. Extremities cold to touch.    Problem/Plan - 5:  ·  Problem: DM (diabetes mellitus).   ·  Plan: Diabetic neuropathy w/ loss of sensation. On metformin, sitagliptin for home regimen. Per endocrine note 2 years prio, pt does not self monitor glucose at home and is unable to see glucometer or lancets d/t vision issues.     -mISS   -Recheck  Hgb A1C.    Problem/Plan - 6:  ·  Problem: Hypertension.   ·  Plan: -c/w toprol 50 mg PO QD   -c/w losartan 50 mg PO QD.    Problem/Plan - 7:  ·  Problem: Chronic leukemia in remission.   ·  Plan: last dose of chemo completed on 10/19. Follows outpatient w/ Dr. Brown.    Problem/Plan - 8:  ·  Problem: HLD (hyperlipidemia).   ·  Plan: -c/w rosuvastatin 10 mg PO QD.    Problem/Plan - 9:  ·  Problem: Healthcare maintenance.   ·  Plan: N: consistent carb diet   E: replete PRN  DVT: lovenox subq   Code:   Dispo:

## 2023-09-07 NOTE — PROGRESS NOTE ADULT - PROBLEM SELECTOR PLAN 5
Diabetic neuropathy w/ loss of sensation. On metformin, sitagliptin for home regimen. Per endocrine note 2 years prio, pt does not self monitor glucose at home and is unable to see glucometer or lancets d/t vision issues.     -mISS   -Recheck  Hgb A1C

## 2023-09-07 NOTE — BH CONSULTATION LIAISON ASSESSMENT NOTE - NSICDXBHSECONDARYDX_PSY_ALL_CORE
History of schizoaffective disorder   Z86.59  History of schizophrenia   Z86.59  History of major depression   Z86.59  History of anxiety disorder   Z86.59   History of anxiety disorder   Z86.59  Intellectual disability   [id number]

## 2023-09-07 NOTE — PROGRESS NOTE ADULT - SUBJECTIVE AND OBJECTIVE BOX
**INCOMPLETE NOTE    OVERNIGHT EVENTS:    SUBJECTIVE:  Patient seen and examined at bedside.    Vital Signs Last 12 Hrs  T(F): 97.5 (09-07-23 @ 05:15), Max: 97.5 (09-07-23 @ 05:15)  HR: 84 (09-07-23 @ 05:15) (84 - 84)  BP: 152/81 (09-07-23 @ 05:15) (152/81 - 152/81)  BP(mean): --  RR: 18 (09-07-23 @ 05:15) (18 - 18)  SpO2: 97% (09-07-23 @ 05:15) (97% - 97%)  I&O's Summary    06 Sep 2023 07:01  -  07 Sep 2023 07:00  --------------------------------------------------------  IN: 0 mL / OUT: 1900 mL / NET: -1900 mL        PHYSICAL EXAM:  Constitutional: NAD, comfortable in bed.  HEENT: NC/AT, PERRLA, EOMI, no conjunctival pallor or scleral icterus, MMM  Neck: Supple, no JVD  Respiratory: CTA B/L. No w/r/r.   Cardiovascular: RRR, normal S1 and S2, no m/r/g.   Gastrointestinal: +BS, soft NTND, no guarding or rebound tenderness, no palpable masses   Extremities: wwp; no cyanosis, clubbing or edema.   Vascular: Pulses equal and strong throughout.   Neurological: AAOx3, no CN deficits, strength and sensation intact throughout.   Skin: No gross skin abnormalities or rashes        LABS:                        14.8   13.27 )-----------( 269      ( 06 Sep 2023 14:24 )             46.8     09-06    144  |  108  |  17  ----------------------------<  104<H>  4.0   |  23  |  0.83    Ca    9.5      06 Sep 2023 14:24        Urinalysis Basic - ( 06 Sep 2023 14:24 )    Color: x / Appearance: x / SG: x / pH: x  Gluc: 104 mg/dL / Ketone: x  / Bili: x / Urobili: x   Blood: x / Protein: x / Nitrite: x   Leuk Esterase: x / RBC: x / WBC x   Sq Epi: x / Non Sq Epi: x / Bacteria: x          RADIOLOGY & ADDITIONAL TESTS:    MEDICATIONS  (STANDING):  atorvastatin 40 milliGRAM(s) Oral at bedtime  dextrose 5%. 1000 milliLiter(s) (50 mL/Hr) IV Continuous <Continuous>  dextrose 5%. 1000 milliLiter(s) (100 mL/Hr) IV Continuous <Continuous>  dextrose 50% Injectable 25 Gram(s) IV Push once  dextrose 50% Injectable 12.5 Gram(s) IV Push once  dextrose 50% Injectable 25 Gram(s) IV Push once  enoxaparin Injectable 40 milliGRAM(s) SubCutaneous every 24 hours  glucagon  Injectable 1 milliGRAM(s) IntraMuscular once  insulin lispro (ADMELOG) corrective regimen sliding scale   SubCutaneous Before meals and at bedtime  losartan 50 milliGRAM(s) Oral daily  metoprolol succinate ER 50 milliGRAM(s) Oral daily  piperacillin/tazobactam IVPB.. 3.375 Gram(s) IV Intermittent every 8 hours  QUEtiapine 100 milliGRAM(s) Oral at bedtime  vancomycin  IVPB 1500 milliGRAM(s) IV Intermittent every 12 hours    MEDICATIONS  (PRN):  acetaminophen     Tablet .. 650 milliGRAM(s) Oral every 6 hours PRN Temp greater or equal to 38C (100.4F), Mild Pain (1 - 3)  aluminum hydroxide/magnesium hydroxide/simethicone Suspension 30 milliLiter(s) Oral every 4 hours PRN Dyspepsia  dextrose Oral Gel 15 Gram(s) Oral once PRN Blood Glucose LESS THAN 70 milliGRAM(s)/deciliter  melatonin 3 milliGRAM(s) Oral at bedtime PRN Insomnia  ondansetron Injectable 4 milliGRAM(s) IV Push every 8 hours PRN Nausea and/or Vomiting   OVERNIGHT EVENTS: MERLINE    SUBJECTIVE:  Patient seen and examined at bedside. Pt states that he did not want to have his MRI last night however he wants to get it now.     Vital Signs Last 12 Hrs  T(F): 97.5 (09-07-23 @ 05:15), Max: 97.5 (09-07-23 @ 05:15)  HR: 84 (09-07-23 @ 05:15) (84 - 84)  BP: 152/81 (09-07-23 @ 05:15) (152/81 - 152/81)  BP(mean): --  RR: 18 (09-07-23 @ 05:15) (18 - 18)  SpO2: 97% (09-07-23 @ 05:15) (97% - 97%)  I&O's Summary    06 Sep 2023 07:01  -  07 Sep 2023 07:00  --------------------------------------------------------  IN: 0 mL / OUT: 1900 mL / NET: -1900 mL        PHYSICAL EXAM:  Constitutional: NAD, comfortable in bed.  HEENT: NC/AT, MMM  Neck: Supple, no JVD  Respiratory: CTA B/L. No w/r/r.   Cardiovascular: RRR, normal S1 and S2, no m/r/g.   Gastrointestinal: +BS, soft NTND, no guarding or rebound tenderness, no palpable masses   Extremities: wwp; b/l LE edema.   Vascular: Pulses equal throughout.   Neurological: AAOx3  Skin: Right great toe ulcer, tenderness and redness.          LABS:                        14.8   13.27 )-----------( 269      ( 06 Sep 2023 14:24 )             46.8     09-06    144  |  108  |  17  ----------------------------<  104<H>  4.0   |  23  |  0.83    Ca    9.5      06 Sep 2023 14:24        Urinalysis Basic - ( 06 Sep 2023 14:24 )    Color: x / Appearance: x / SG: x / pH: x  Gluc: 104 mg/dL / Ketone: x  / Bili: x / Urobili: x   Blood: x / Protein: x / Nitrite: x   Leuk Esterase: x / RBC: x / WBC x   Sq Epi: x / Non Sq Epi: x / Bacteria: x          RADIOLOGY & ADDITIONAL TESTS:    MEDICATIONS  (STANDING):  atorvastatin 40 milliGRAM(s) Oral at bedtime  dextrose 5%. 1000 milliLiter(s) (50 mL/Hr) IV Continuous <Continuous>  dextrose 5%. 1000 milliLiter(s) (100 mL/Hr) IV Continuous <Continuous>  dextrose 50% Injectable 25 Gram(s) IV Push once  dextrose 50% Injectable 12.5 Gram(s) IV Push once  dextrose 50% Injectable 25 Gram(s) IV Push once  enoxaparin Injectable 40 milliGRAM(s) SubCutaneous every 24 hours  glucagon  Injectable 1 milliGRAM(s) IntraMuscular once  insulin lispro (ADMELOG) corrective regimen sliding scale   SubCutaneous Before meals and at bedtime  losartan 50 milliGRAM(s) Oral daily  metoprolol succinate ER 50 milliGRAM(s) Oral daily  piperacillin/tazobactam IVPB.. 3.375 Gram(s) IV Intermittent every 8 hours  QUEtiapine 100 milliGRAM(s) Oral at bedtime  vancomycin  IVPB 1500 milliGRAM(s) IV Intermittent every 12 hours    MEDICATIONS  (PRN):  acetaminophen     Tablet .. 650 milliGRAM(s) Oral every 6 hours PRN Temp greater or equal to 38C (100.4F), Mild Pain (1 - 3)  aluminum hydroxide/magnesium hydroxide/simethicone Suspension 30 milliLiter(s) Oral every 4 hours PRN Dyspepsia  dextrose Oral Gel 15 Gram(s) Oral once PRN Blood Glucose LESS THAN 70 milliGRAM(s)/deciliter  melatonin 3 milliGRAM(s) Oral at bedtime PRN Insomnia  ondansetron Injectable 4 milliGRAM(s) IV Push every 8 hours PRN Nausea and/or Vomiting

## 2023-09-07 NOTE — DIETITIAN INITIAL EVALUATION ADULT - ADD RECOMMEND
1. Continue with current diet regimen (conscho)  2. Monitor PO intake/tolerance; honor pt food preferences as able   3. Add daily multivitamin + ascorbic acid 500mg BID to promote better wound healing   4. Monitor GI fxn, labs, skin integrity, wts   > adjust bowel regimen prn to promote optimal stooling   5. Fluids per MD discretion   6. RD to remain available

## 2023-09-07 NOTE — BH CONSULTATION LIAISON ASSESSMENT NOTE - RISK ASSESSMENT
Risk factors: present and past psychiatric illness, low frustration tolerance.  Protective factors: adherence to treatment Low acute suicide risks.  Risk factors: present and past psychiatric illness, low frustration tolerance.  Protective factors: adherence to treatment. Low acute suicide and violence risk.  Risk factors: present and past psychiatric illness, low frustration tolerance.  Protective factors: adherence to treatment. sobriety. residential stability. no known h/o suicide attempts or violence

## 2023-09-07 NOTE — BH CONSULTATION LIAISON ASSESSMENT NOTE - NSBHCHARTREVIEWLAB_PSY_A_CORE FT
14.8   13.27 )-----------( 269      ( 06 Sep 2023 14:24 )             46.8     09-06    144  |  108  |  17  ----------------------------<  104<H>  4.0   |  23  |  0.83    Ca    9.5      06 Sep 2023 14:24

## 2023-09-07 NOTE — BH CONSULTATION LIAISON ASSESSMENT NOTE - NSBHTIMEACTIVITIESPERFORMED_PSY_A_CORE
chart review including past medical record review, patient interview, psychoeducation re: med management, medical decision making, collateral history from therapist, coordination of care with primary team

## 2023-09-07 NOTE — BH CONSULTATION LIAISON ASSESSMENT NOTE - DIFFERENTIAL
intellectual disabilities vs. underlying personality d/o schizoaffective d/o, intellectual disability r/o personality d/o r/o adjustment d/o

## 2023-09-07 NOTE — DIETITIAN INITIAL EVALUATION ADULT - PROBLEM SELECTOR PLAN 2
Chronic R toe wound w/ surrounding redness, erythema, warmth, tenderness. Afebrile, vitals WNL. HDS. Mild leukocytosis WBC 13.27. CRP 6.2, ESR 7. Podiatry initially saw pt in the ED who refused evaluation. XR R foot findings concerning for septic arthritis/OM centered at first MTP joint w/ recommendations for f/u MRI. s/p 1 dose of vanc/zosyn in ED.     -Consulted wound care   -Awaiting Podiatry recommendations   -f/u w/ MRI of R foot  -f/u repeat ESR, CRP in AM  -c/w vancomycin/zosyn for OM coverage

## 2023-09-07 NOTE — PROGRESS NOTE ADULT - ATTENDING COMMENTS
78-year-old male with a PMHx of CAD (s/p CABG), DMII, CLL, HTN and schizoaffective disorder who presented with sepsis 2/2 right great toe cellulitis.    #Sepsis 2/2 Right Great Toe Cellulitis    -podiatry consulted, follow up recommendations   -continue with vancomycin and zosyn for now, de-escalate as able based on culture data and clinical progression    -follow up MRI to rule out OM   -follow up BCx and wound Cx    -trend inflammatory markers      #Schizoaffective Disorder   #MDD/AMBERLY  #Bipolar Disorder  #Agitation   -psych consulted, appreciate recommendations as below  -continue with Seroquel 200mg BID, Klonopin 0.5mg daily and PRN Seroquel 50mg BID     #CAD  -continue with DAPT, Imdur and Metoprolol     DVT PPx: Lovenox

## 2023-09-07 NOTE — CONSULT NOTE ADULT - SUBJECTIVE AND OBJECTIVE BOX
Attending:    Patient is a 78y old  Male who presents with a chief complaint of DIABETIC FOOT ULCER     (07 Sep 2023 11:36)      HPI:  78y M w/ PMHx CAD s/p CABG, DM w/ neuropathy, CLL, HTN, adjustment disorder w/ anxious mood, schizoaffective p/w R great toe redness, erythema, pain w/ pt unable to recall when wound started. Pt seen by PCP, Dr. Brown today who inspected wound and instructed pt to present to ED. Previous issues w/ leg swelling and leg swelling/wound has decreased his ability to ambulate, can only walk 1 block now d/t leg heaviness, pain. Per pt., he had home health services but dismissed them d/t them "not doing anything". Reports chest pain associated w/ anxiety which he takes nitro for. Otherwise denies fever, SOB, abdominal pain, N/V. Normal appetite.  (06 Sep 2023 17:10)      Podiatry Addendum    Pt was evaluated bedside. Mr. Elias is a poor historian. States that he has had the erythema on his R forfoot for the past month although history is not reliable. He is unsure when the bruise on the distal tip of hallux came on. He was followed by a podiatrist in [place] however he does not recall the name. The pt states that he has a hx of a Nevarez implant on his right foot however the date of surgery is unknown at this time. Pt is concerned that the infection may be travelling to his other foot. Denies any recent trama to the area.          XR of Left foot     Review of systems negative except per HPI and as stated below  General:	 no weakness; no fevers, no chills  Skin/Breast: no rash  Respiratory and Thorax: no SOB, no cough  Cardiovascular:	No chest pain  Gastrointestinal:	 no nausea, vomiting , diarrhea  Genitourinary:	no dysuria, no difficulty urinating, no hematuria  Musculoskeletal:	no weakness, no joint swelling/pain  Neurological:	no focal weakness/numbness  Endocrine:	no polyuria, no polydipsia    PAST MEDICAL & SURGICAL HISTORY:  Hypertension      Sleep apnea, unspecified type      Chronic leukemia in remission      DM (diabetes mellitus)  Type 2      HLD (hyperlipidemia)      S/P CABG (coronary artery bypass graft)        Home Medications:  aspirin 81 mg oral delayed release tablet: 1 tab(s) orally once a day (06 Sep 2023 14:41)  clopidogrel 75 mg oral tablet: 1 tab(s) orally once a day (06 Sep 2023 14:41)  fexofenadine 180 mg oral tablet: 1 orally (06 Sep 2023 14:41)  isosorbide mononitrate 60 mg oral tablet, extended release: 1 orally (06 Sep 2023 14:41)  latanoprost 0.005% ophthalmic solution: 1 drop(s) to each affected eye once a day (at bedtime) (06 Sep 2023 14:41)  losartan 50 mg oral tablet: 1 orally (06 Sep 2023 14:41)  meclizine 25 mg oral tablet: 1 tab(s) orally 3 times a day (06 Sep 2023 14:41)  metFORMIN 1000 mg oral tablet: 1 tab(s) orally 2 times a day (06 Sep 2023 14:41)  Metoprolol Succinate ER 50 mg oral tablet, extended release: 1 orally (06 Sep 2023 14:41)  pantoprazole 40 mg oral delayed release tablet: 1 orally (06 Sep 2023 14:41)  pregabalin 75 mg oral capsule: 1 orally (06 Sep 2023 14:41)  QUEtiapine 100 mg oral tablet: 1 orally (06 Sep 2023 14:41)  ranolazine 500 mg oral granule, extended release: 500 orally (06 Sep 2023 14:41)  rosuvastatin 10 mg oral tablet: 1 orally (06 Sep 2023 14:41)  SITagliptin 100 mg oral tablet: 1 tab(s) orally once a day (06 Sep 2023 14:41)    Allergies    No Known Allergies    Intolerances      FAMILY HISTORY:  No pertinent family history in first degree relatives      Social History:       LABS                        14.8   13.27 )-----------( 269      ( 06 Sep 2023 14:24 )             46.8     09-06    144  |  108  |  17  ----------------------------<  104<H>  4.0   |  23  |  0.83    Ca    9.5      06 Sep 2023 14:24          Vital Signs Last 24 Hrs  T(C): 36.9 (07 Sep 2023 11:20), Max: 36.9 (07 Sep 2023 11:20)  T(F): 98.4 (07 Sep 2023 11:20), Max: 98.4 (07 Sep 2023 11:20)  HR: 87 (07 Sep 2023 11:20) (78 - 87)  BP: 150/73 (07 Sep 2023 11:20) (126/69 - 155/65)  BP(mean): --  RR: 16 (07 Sep 2023 11:20) (16 - 18)  SpO2: 98% (07 Sep 2023 11:20) (97% - 100%)    Parameters below as of 07 Sep 2023 11:20  Patient On (Oxygen Delivery Method): room air        PHYSICAL EXAM  General: NAD, AA0x3    Lower Extremity Focused:  Vasc: Pulses are faint, L foot very cold to touch in comparison to right foot.   Derm: Ecchymosis noted on the distal tip of R hallux and dorsal-medial aspect of left foot. MMild erythema noted on dorsal aspect of R forefoot. no IDM noted. - open lesions, -PTB,   Neuro: Protective sensation diminished  MSK: Limited ROM on R Hallux MPJ     RADIOLOGY    < from: Xray Foot AP + Lateral + Oblique, Right (09.06.23 @ 17:04) >  IMPRESSION: Findings concerning for septic arthritis/osteomyelitis   centered at the first MTP joint. Advise further evaluation/workup with   forefoot MRI. Additional chronic findings as above.      < end of copied text >                     Attending:    Patient is a 78y old  Male who presents with a chief complaint of DIABETIC FOOT ULCER     (07 Sep 2023 11:36)      HPI:  78y M w/ PMHx CAD s/p CABG, DM w/ neuropathy, CLL, HTN, adjustment disorder w/ anxious mood, schizoaffective p/w R great toe redness, erythema, pain w/ pt unable to recall when wound started. Pt seen by PCP, Dr. Bronw today who inspected wound and instructed pt to present to ED. Previous issues w/ leg swelling and leg swelling/wound has decreased his ability to ambulate, can only walk 1 block now d/t leg heaviness, pain. Per pt., he had home health services but dismissed them d/t them "not doing anything". Reports chest pain associated w/ anxiety which he takes nitro for. Otherwise denies fever, SOB, abdominal pain, N/V. Normal appetite.  (06 Sep 2023 17:10)      Podiatry Addendum    Pt was evaluated bedside. Mr. Elias is a poor historian. States that he has had the erythema on his R forfoot for the past month although history is not reliable. He is unsure when the bruise on the distal tip of hallux came on. He was followed by a podiatrist in [place] however he does not recall the name. The pt states that he has a hx of a Nevarez implant on his right foot however the date of surgery is unknown at this time. Pt is concerned that the infection may be travelling to his other foot. Denies any recent trama to the area.          XR of Left foot     Review of systems negative except per HPI and as stated below  General:	 no weakness; no fevers, no chills  Skin/Breast: no rash  Respiratory and Thorax: no SOB, no cough  Cardiovascular:	No chest pain  Gastrointestinal:	 no nausea, vomiting , diarrhea  Genitourinary:	no dysuria, no difficulty urinating, no hematuria  Musculoskeletal:	no weakness, no joint swelling/pain  Neurological:	no focal weakness/numbness  Endocrine:	no polyuria, no polydipsia    PAST MEDICAL & SURGICAL HISTORY:  Hypertension      Sleep apnea, unspecified type      Chronic leukemia in remission      DM (diabetes mellitus)  Type 2      HLD (hyperlipidemia)      S/P CABG (coronary artery bypass graft)        Home Medications:  aspirin 81 mg oral delayed release tablet: 1 tab(s) orally once a day (06 Sep 2023 14:41)  clopidogrel 75 mg oral tablet: 1 tab(s) orally once a day (06 Sep 2023 14:41)  fexofenadine 180 mg oral tablet: 1 orally (06 Sep 2023 14:41)  isosorbide mononitrate 60 mg oral tablet, extended release: 1 orally (06 Sep 2023 14:41)  latanoprost 0.005% ophthalmic solution: 1 drop(s) to each affected eye once a day (at bedtime) (06 Sep 2023 14:41)  losartan 50 mg oral tablet: 1 orally (06 Sep 2023 14:41)  meclizine 25 mg oral tablet: 1 tab(s) orally 3 times a day (06 Sep 2023 14:41)  metFORMIN 1000 mg oral tablet: 1 tab(s) orally 2 times a day (06 Sep 2023 14:41)  Metoprolol Succinate ER 50 mg oral tablet, extended release: 1 orally (06 Sep 2023 14:41)  pantoprazole 40 mg oral delayed release tablet: 1 orally (06 Sep 2023 14:41)  pregabalin 75 mg oral capsule: 1 orally (06 Sep 2023 14:41)  QUEtiapine 100 mg oral tablet: 1 orally (06 Sep 2023 14:41)  ranolazine 500 mg oral granule, extended release: 500 orally (06 Sep 2023 14:41)  rosuvastatin 10 mg oral tablet: 1 orally (06 Sep 2023 14:41)  SITagliptin 100 mg oral tablet: 1 tab(s) orally once a day (06 Sep 2023 14:41)    Allergies    No Known Allergies    Intolerances      FAMILY HISTORY:  No pertinent family history in first degree relatives      Social History:       LABS                        14.8   13.27 )-----------( 269      ( 06 Sep 2023 14:24 )             46.8     09-06    144  |  108  |  17  ----------------------------<  104<H>  4.0   |  23  |  0.83    Ca    9.5      06 Sep 2023 14:24          Vital Signs Last 24 Hrs  T(C): 36.9 (07 Sep 2023 11:20), Max: 36.9 (07 Sep 2023 11:20)  T(F): 98.4 (07 Sep 2023 11:20), Max: 98.4 (07 Sep 2023 11:20)  HR: 87 (07 Sep 2023 11:20) (78 - 87)  BP: 150/73 (07 Sep 2023 11:20) (126/69 - 155/65)  BP(mean): --  RR: 16 (07 Sep 2023 11:20) (16 - 18)  SpO2: 98% (07 Sep 2023 11:20) (97% - 100%)    Parameters below as of 07 Sep 2023 11:20  Patient On (Oxygen Delivery Method): room air        PHYSICAL EXAM  General: NAD, AA0x3    Lower Extremity Focused:  Vasc: Pulses are faint, L foot very cold to touch in comparison to right foot.   Derm: Ecchymosis noted on the distal tip of R hallux and dorsal-medial aspect of left foot. Mild erythema noted on dorsal aspect of R forefoot. no IDM noted. No open lesions, -PTB, - drainage, - malodor   Neuro: Protective sensation diminished  MSK: Limited ROM on R Hallux MPJ     RADIOLOGY    < from: Xray Foot AP + Lateral + Oblique, Right (09.06.23 @ 17:04) >  IMPRESSION: Findings concerning for septic arthritis/osteomyelitis   centered at the first MTP joint. Advise further evaluation/workup with   forefoot MRI. Additional chronic findings as above.      < end of copied text >                     Attending:    Patient is a 78y old  Male who presents with a chief complaint of DIABETIC FOOT ULCER     (07 Sep 2023 11:36)      HPI:  78y M w/ PMHx CAD s/p CABG, DM w/ neuropathy, CLL, HTN, adjustment disorder w/ anxious mood, schizoaffective p/w R great toe redness, erythema, pain w/ pt unable to recall when wound started. Pt seen by PCP, Dr. Brown today who inspected wound and instructed pt to present to ED. Previous issues w/ leg swelling and leg swelling/wound has decreased his ability to ambulate, can only walk 1 block now d/t leg heaviness, pain. Per pt., he had home health services but dismissed them d/t them "not doing anything". Reports chest pain associated w/ anxiety which he takes nitro for. Otherwise denies fever, SOB, abdominal pain, N/V. Normal appetite.  (06 Sep 2023 17:10)      Podiatry Addendum    Pt was evaluated bedside. Mr. Elias is a poor historian. States that he has had the erythema on his R forefoot for the past month although history is not reliable. He is unsure when the bruise on the distal tip of hallux came on. Pt notes having a hx of DM and venous insufficiency He was followed by a podiatrist in [place] however he does not recall the name. The pt states that he has a hx of a Nevarez implant on his right foot however the date of surgery is unknown at this time. Pt is concerned that the infection may be travelling to his other foot. Denies any recent trama to the area.          XR of Left foot     Review of systems negative except per HPI and as stated below  General:	 no weakness; no fevers, no chills  Skin/Breast: no rash  Respiratory and Thorax: no SOB, no cough  Cardiovascular:	No chest pain  Gastrointestinal:	 no nausea, vomiting , diarrhea  Genitourinary:	no dysuria, no difficulty urinating, no hematuria  Musculoskeletal:	no weakness, no joint swelling/pain  Neurological:	no focal weakness/numbness  Endocrine:	no polyuria, no polydipsia    PAST MEDICAL & SURGICAL HISTORY:  Hypertension      Sleep apnea, unspecified type      Chronic leukemia in remission      DM (diabetes mellitus)  Type 2      HLD (hyperlipidemia)      S/P CABG (coronary artery bypass graft)        Home Medications:  aspirin 81 mg oral delayed release tablet: 1 tab(s) orally once a day (06 Sep 2023 14:41)  clopidogrel 75 mg oral tablet: 1 tab(s) orally once a day (06 Sep 2023 14:41)  fexofenadine 180 mg oral tablet: 1 orally (06 Sep 2023 14:41)  isosorbide mononitrate 60 mg oral tablet, extended release: 1 orally (06 Sep 2023 14:41)  latanoprost 0.005% ophthalmic solution: 1 drop(s) to each affected eye once a day (at bedtime) (06 Sep 2023 14:41)  losartan 50 mg oral tablet: 1 orally (06 Sep 2023 14:41)  meclizine 25 mg oral tablet: 1 tab(s) orally 3 times a day (06 Sep 2023 14:41)  metFORMIN 1000 mg oral tablet: 1 tab(s) orally 2 times a day (06 Sep 2023 14:41)  Metoprolol Succinate ER 50 mg oral tablet, extended release: 1 orally (06 Sep 2023 14:41)  pantoprazole 40 mg oral delayed release tablet: 1 orally (06 Sep 2023 14:41)  pregabalin 75 mg oral capsule: 1 orally (06 Sep 2023 14:41)  QUEtiapine 100 mg oral tablet: 1 orally (06 Sep 2023 14:41)  ranolazine 500 mg oral granule, extended release: 500 orally (06 Sep 2023 14:41)  rosuvastatin 10 mg oral tablet: 1 orally (06 Sep 2023 14:41)  SITagliptin 100 mg oral tablet: 1 tab(s) orally once a day (06 Sep 2023 14:41)    Allergies    No Known Allergies    Intolerances      FAMILY HISTORY:  No pertinent family history in first degree relatives      Social History:       LABS                        14.8   13.27 )-----------( 269      ( 06 Sep 2023 14:24 )             46.8     09-06    144  |  108  |  17  ----------------------------<  104<H>  4.0   |  23  |  0.83    Ca    9.5      06 Sep 2023 14:24          Vital Signs Last 24 Hrs  T(C): 36.9 (07 Sep 2023 11:20), Max: 36.9 (07 Sep 2023 11:20)  T(F): 98.4 (07 Sep 2023 11:20), Max: 98.4 (07 Sep 2023 11:20)  HR: 87 (07 Sep 2023 11:20) (78 - 87)  BP: 150/73 (07 Sep 2023 11:20) (126/69 - 155/65)  BP(mean): --  RR: 16 (07 Sep 2023 11:20) (16 - 18)  SpO2: 98% (07 Sep 2023 11:20) (97% - 100%)    Parameters below as of 07 Sep 2023 11:20  Patient On (Oxygen Delivery Method): room air        PHYSICAL EXAM  General: NAD, AA0x3    Lower Extremity Focused:  Vasc: Pulses are faint, L foot very cold to touch in comparison to right foot.   Derm: Ecchymosis noted on the distal tip of R hallux and dorsal-medial aspect of left foot. Mild erythema noted on dorsal aspect of R forefoot. no IDM noted. No open lesions, -PTB, - drainage, - malodor   Neuro: Protective sensation diminished  MSK: Limited ROM on R Hallux MPJ     RADIOLOGY    < from: Xray Foot AP + Lateral + Oblique, Right (09.06.23 @ 17:04) >  IMPRESSION: Findings concerning for septic arthritis/osteomyelitis   centered at the first MTP joint. Advise further evaluation/workup with   forefoot MRI. Additional chronic findings as above.      < end of copied text >                     Attending:    Patient is a 78y old  Male who presents with a chief complaint of DIABETIC FOOT ULCER     (07 Sep 2023 11:36)      HPI:  78y M w/ PMHx CAD s/p CABG, DM w/ neuropathy, CLL, HTN, adjustment disorder w/ anxious mood, schizoaffective p/w R great toe redness, erythema, pain w/ pt unable to recall when wound started. Pt seen by PCP, Dr. Brown today who inspected wound and instructed pt to present to ED. Previous issues w/ leg swelling and leg swelling/wound has decreased his ability to ambulate, can only walk 1 block now d/t leg heaviness, pain. Per pt., he had home health services but dismissed them d/t them "not doing anything". Reports chest pain associated w/ anxiety which he takes nitro for. Otherwise denies fever, SOB, abdominal pain, N/V. Normal appetite.  (06 Sep 2023 17:10)      Podiatry Addendum    Pt was evaluated bedside. Mr. Elias is a poor historian. States that he has had the erythema on his R forefoot for the past month although history is not reliable. He is unsure when the bruise on the distal tip of hallux came on. Pt notes having a hx of DM and venous insufficiency He was followed by a podiatrist in [place] however he does not recall the name. The pt states that he has a hx of a Nevarez implant on his right foot however the date of surgery is unknown currently. Pt is concerned that the infection may be travelling to his other foot. Denies any recent trauma to the area.          XR of Left foot     Review of systems negative except per HPI and as stated below  General:	 no weakness; no fevers, no chills  Skin/Breast: no rash  Respiratory and Thorax: no SOB, no cough  Cardiovascular:	No chest pain  Gastrointestinal:	 no nausea, vomiting , diarrhea  Genitourinary:	no dysuria, no difficulty urinating, no hematuria  Musculoskeletal:	no weakness, no joint swelling/pain  Neurological:	no focal weakness/numbness  Endocrine:	no polyuria, no polydipsia    PAST MEDICAL & SURGICAL HISTORY:  Hypertension      Sleep apnea, unspecified type      Chronic leukemia in remission      DM (diabetes mellitus)  Type 2      HLD (hyperlipidemia)      S/P CABG (coronary artery bypass graft)        Home Medications:  aspirin 81 mg oral delayed release tablet: 1 tab(s) orally once a day (06 Sep 2023 14:41)  clopidogrel 75 mg oral tablet: 1 tab(s) orally once a day (06 Sep 2023 14:41)  fexofenadine 180 mg oral tablet: 1 orally (06 Sep 2023 14:41)  isosorbide mononitrate 60 mg oral tablet, extended release: 1 orally (06 Sep 2023 14:41)  latanoprost 0.005% ophthalmic solution: 1 drop(s) to each affected eye once a day (at bedtime) (06 Sep 2023 14:41)  losartan 50 mg oral tablet: 1 orally (06 Sep 2023 14:41)  meclizine 25 mg oral tablet: 1 tab(s) orally 3 times a day (06 Sep 2023 14:41)  metFORMIN 1000 mg oral tablet: 1 tab(s) orally 2 times a day (06 Sep 2023 14:41)  Metoprolol Succinate ER 50 mg oral tablet, extended release: 1 orally (06 Sep 2023 14:41)  pantoprazole 40 mg oral delayed release tablet: 1 orally (06 Sep 2023 14:41)  pregabalin 75 mg oral capsule: 1 orally (06 Sep 2023 14:41)  QUEtiapine 100 mg oral tablet: 1 orally (06 Sep 2023 14:41)  ranolazine 500 mg oral granule, extended release: 500 orally (06 Sep 2023 14:41)  rosuvastatin 10 mg oral tablet: 1 orally (06 Sep 2023 14:41)  SITagliptin 100 mg oral tablet: 1 tab(s) orally once a day (06 Sep 2023 14:41)    Allergies    No Known Allergies    Intolerances      FAMILY HISTORY:  No pertinent family history in first degree relatives      Social History:       LABS                        14.8   13.27 )-----------( 269      ( 06 Sep 2023 14:24 )             46.8     09-06    144  |  108  |  17  ----------------------------<  104<H>  4.0   |  23  |  0.83    Ca    9.5      06 Sep 2023 14:24          Vital Signs Last 24 Hrs  T(C): 36.9 (07 Sep 2023 11:20), Max: 36.9 (07 Sep 2023 11:20)  T(F): 98.4 (07 Sep 2023 11:20), Max: 98.4 (07 Sep 2023 11:20)  HR: 87 (07 Sep 2023 11:20) (78 - 87)  BP: 150/73 (07 Sep 2023 11:20) (126/69 - 155/65)  BP(mean): --  RR: 16 (07 Sep 2023 11:20) (16 - 18)  SpO2: 98% (07 Sep 2023 11:20) (97% - 100%)    Parameters below as of 07 Sep 2023 11:20  Patient On (Oxygen Delivery Method): room air        PHYSICAL EXAM  General: NAD, AA0x3    Lower Extremity Focused:  Vasc: DP/PT 1/4 b/l, L foot very cold to touch in comparison to right foot. + 2 pitting edema present.   Derm: Ecchymosis noted on the distal tip of R hallux and dorsal-medial aspect of left foot. Mild erythema noted on dorsal aspect of R forefoot. No IDM noted. No open lesions, -PTB, - drainage, - malodor   Neuro: Protective sensation diminished  MSK: Limited ROM on R Hallux MPJ     RADIOLOGY    < from: Xray Foot AP + Lateral + Oblique, Right (09.06.23 @ 17:04) >  IMPRESSION: Findings concerning for septic arthritis/osteomyelitis   centered at the first MTP joint. Advise further evaluation/workup with   forefoot MRI. Additional chronic findings as above.      < end of copied text >                     Attending:    Patient is a 78y old  Male who presents with a chief complaint of DIABETIC FOOT ULCER     (07 Sep 2023 11:36)      HPI:  78y M w/ PMHx CAD s/p CABG, DM w/ neuropathy, CLL, HTN, adjustment disorder w/ anxious mood, schizoaffective p/w R great toe redness, erythema, pain w/ pt unable to recall when wound started. Pt seen by PCP, Dr. Brown today who inspected wound and instructed pt to present to ED. Previous issues w/ leg swelling and leg swelling/wound has decreased his ability to ambulate, can only walk 1 block now d/t leg heaviness, pain. Per pt., he had home health services but dismissed them d/t them "not doing anything". Reports chest pain associated w/ anxiety which he takes nitro for. Otherwise denies fever, SOB, abdominal pain, N/V. Normal appetite.  (06 Sep 2023 17:10)      Podiatry Addendum    Pt was evaluated bedside. Mr. Elias is a poor historian. States that he has had the erythema on his R forefoot for the past month although history is not reliable. He is unsure when the bruise on the distal tip of hallux came on. Pt notes having a hx of DM and venous insufficiency He was followed by a podiatrist in [place] however he does not recall the name. The pt states that he has a hx of a Nevarez implant on his right foot however the date of surgery is unknown currently. Pt is concerned that the infection may be travelling to his other foot. Denies any recent trauma to the area.          XR of Left foot     Review of systems negative except per HPI and as stated below  General:	 no weakness; no fevers, no chills  Skin/Breast: no rash  Respiratory and Thorax: no SOB, no cough  Cardiovascular:	No chest pain  Gastrointestinal:	 no nausea, vomiting , diarrhea  Genitourinary:	no dysuria, no difficulty urinating, no hematuria  Musculoskeletal:	no weakness, no joint swelling/pain  Neurological:	no focal weakness/numbness  Endocrine:	no polyuria, no polydipsia    PAST MEDICAL & SURGICAL HISTORY:  Hypertension      Sleep apnea, unspecified type      Chronic leukemia in remission      DM (diabetes mellitus)  Type 2      HLD (hyperlipidemia)      S/P CABG (coronary artery bypass graft)        Home Medications:  aspirin 81 mg oral delayed release tablet: 1 tab(s) orally once a day (06 Sep 2023 14:41)  clopidogrel 75 mg oral tablet: 1 tab(s) orally once a day (06 Sep 2023 14:41)  fexofenadine 180 mg oral tablet: 1 orally (06 Sep 2023 14:41)  isosorbide mononitrate 60 mg oral tablet, extended release: 1 orally (06 Sep 2023 14:41)  latanoprost 0.005% ophthalmic solution: 1 drop(s) to each affected eye once a day (at bedtime) (06 Sep 2023 14:41)  losartan 50 mg oral tablet: 1 orally (06 Sep 2023 14:41)  meclizine 25 mg oral tablet: 1 tab(s) orally 3 times a day (06 Sep 2023 14:41)  metFORMIN 1000 mg oral tablet: 1 tab(s) orally 2 times a day (06 Sep 2023 14:41)  Metoprolol Succinate ER 50 mg oral tablet, extended release: 1 orally (06 Sep 2023 14:41)  pantoprazole 40 mg oral delayed release tablet: 1 orally (06 Sep 2023 14:41)  pregabalin 75 mg oral capsule: 1 orally (06 Sep 2023 14:41)  QUEtiapine 100 mg oral tablet: 1 orally (06 Sep 2023 14:41)  ranolazine 500 mg oral granule, extended release: 500 orally (06 Sep 2023 14:41)  rosuvastatin 10 mg oral tablet: 1 orally (06 Sep 2023 14:41)  SITagliptin 100 mg oral tablet: 1 tab(s) orally once a day (06 Sep 2023 14:41)    Allergies    No Known Allergies    Intolerances      FAMILY HISTORY:  No pertinent family history in first degree relatives      Social History:       LABS                        14.8   13.27 )-----------( 269      ( 06 Sep 2023 14:24 )             46.8     09-06    144  |  108  |  17  ----------------------------<  104<H>  4.0   |  23  |  0.83    Ca    9.5      06 Sep 2023 14:24          Vital Signs Last 24 Hrs  T(C): 36.9 (07 Sep 2023 11:20), Max: 36.9 (07 Sep 2023 11:20)  T(F): 98.4 (07 Sep 2023 11:20), Max: 98.4 (07 Sep 2023 11:20)  HR: 87 (07 Sep 2023 11:20) (78 - 87)  BP: 150/73 (07 Sep 2023 11:20) (126/69 - 155/65)  BP(mean): --  RR: 16 (07 Sep 2023 11:20) (16 - 18)  SpO2: 98% (07 Sep 2023 11:20) (97% - 100%)    Parameters below as of 07 Sep 2023 11:20  Patient On (Oxygen Delivery Method): room air        PHYSICAL EXAM  General: NAD, AA0x3    Lower Extremity Focused:  Vasc: DP/PT 1/4 b/l, L foot very cold to touch in comparison to right foot. + 2 pitting edema present.   Derm: Ecchymosis noted on the distal tip of R hallux and dorsal-medial aspect of left foot. Mild erythema noted on dorsal aspect of R forefoot. No IDM noted. No open lesions, -PTB, - drainage, - malodor   Neuro: Protective sensation diminished  MSK: Limited ROM on R Hallux MPJ     RADIOLOGY    < from: Xray Foot AP + Lateral + Oblique, Right (09.06.23 @ 17:04) >  IMPRESSION: Findings concerning for septic arthritis/osteomyelitis   centered at the first MTP joint. Advise further evaluation/workup with   forefoot MRI. Additional chronic findings as above.      < end of copied text >          Wet read of left foot shows no acute fractures or GAS in soft tissues.                    Attending:    Patient is a 78y old  Male who presents with a chief complaint of DIABETIC FOOT ULCER     (07 Sep 2023 11:36)      HPI:  78y M w/ PMHx CAD s/p CABG, DM w/ neuropathy, CLL, HTN, adjustment disorder w/ anxious mood, schizoaffective p/w R great toe redness, erythema, pain w/ pt unable to recall when wound started. Pt seen by PCP, Dr. Brown today who inspected wound and instructed pt to present to ED. Previous issues w/ leg swelling and leg swelling/wound has decreased his ability to ambulate, can only walk 1 block now d/t leg heaviness, pain. Per pt., he had home health services but dismissed them d/t them "not doing anything". Reports chest pain associated w/ anxiety which he takes nitro for. Otherwise denies fever, SOB, abdominal pain, N/V. Normal appetite.  (06 Sep 2023 17:10)      Podiatry Addendum    Pt was evaluated bedside. Mr. Elias is a poor historian. States that he has had the erythema on his R forefoot for the past month although history is not reliable. He is unsure when the bruise on the distal tip of hallux came on. Pt notes having a hx of DM and venous insufficiency He was followed by a podiatrist in [place] however he does not recall the name. The pt states that he has a hx of a Nevarez implant on his right foot however the date of surgery is unknown currently. Pt also noticed new lesion on his L hallux and is unsure when it appeared.  Denies any recent trauma to the area.          XR of Left foot     Review of systems negative except per HPI and as stated below  General:	 no weakness; no fevers, no chills  Skin/Breast: no rash  Respiratory and Thorax: no SOB, no cough  Cardiovascular:	No chest pain  Gastrointestinal:	 no nausea, vomiting , diarrhea  Genitourinary:	no dysuria, no difficulty urinating, no hematuria  Musculoskeletal:	no weakness, no joint swelling/pain  Neurological:	no focal weakness/numbness  Endocrine:	no polyuria, no polydipsia    PAST MEDICAL & SURGICAL HISTORY:  Hypertension      Sleep apnea, unspecified type      Chronic leukemia in remission      DM (diabetes mellitus)  Type 2      HLD (hyperlipidemia)      S/P CABG (coronary artery bypass graft)        Home Medications:  aspirin 81 mg oral delayed release tablet: 1 tab(s) orally once a day (06 Sep 2023 14:41)  clopidogrel 75 mg oral tablet: 1 tab(s) orally once a day (06 Sep 2023 14:41)  fexofenadine 180 mg oral tablet: 1 orally (06 Sep 2023 14:41)  isosorbide mononitrate 60 mg oral tablet, extended release: 1 orally (06 Sep 2023 14:41)  latanoprost 0.005% ophthalmic solution: 1 drop(s) to each affected eye once a day (at bedtime) (06 Sep 2023 14:41)  losartan 50 mg oral tablet: 1 orally (06 Sep 2023 14:41)  meclizine 25 mg oral tablet: 1 tab(s) orally 3 times a day (06 Sep 2023 14:41)  metFORMIN 1000 mg oral tablet: 1 tab(s) orally 2 times a day (06 Sep 2023 14:41)  Metoprolol Succinate ER 50 mg oral tablet, extended release: 1 orally (06 Sep 2023 14:41)  pantoprazole 40 mg oral delayed release tablet: 1 orally (06 Sep 2023 14:41)  pregabalin 75 mg oral capsule: 1 orally (06 Sep 2023 14:41)  QUEtiapine 100 mg oral tablet: 1 orally (06 Sep 2023 14:41)  ranolazine 500 mg oral granule, extended release: 500 orally (06 Sep 2023 14:41)  rosuvastatin 10 mg oral tablet: 1 orally (06 Sep 2023 14:41)  SITagliptin 100 mg oral tablet: 1 tab(s) orally once a day (06 Sep 2023 14:41)    Allergies    No Known Allergies    Intolerances      FAMILY HISTORY:  No pertinent family history in first degree relatives      Social History:       LABS                        14.8   13.27 )-----------( 269      ( 06 Sep 2023 14:24 )             46.8     09-06    144  |  108  |  17  ----------------------------<  104<H>  4.0   |  23  |  0.83    Ca    9.5      06 Sep 2023 14:24          Vital Signs Last 24 Hrs  T(C): 36.9 (07 Sep 2023 11:20), Max: 36.9 (07 Sep 2023 11:20)  T(F): 98.4 (07 Sep 2023 11:20), Max: 98.4 (07 Sep 2023 11:20)  HR: 87 (07 Sep 2023 11:20) (78 - 87)  BP: 150/73 (07 Sep 2023 11:20) (126/69 - 155/65)  BP(mean): --  RR: 16 (07 Sep 2023 11:20) (16 - 18)  SpO2: 98% (07 Sep 2023 11:20) (97% - 100%)    Parameters below as of 07 Sep 2023 11:20  Patient On (Oxygen Delivery Method): room air        PHYSICAL EXAM  General: NAD, AA0x3    Lower Extremity Focused:  Vasc: DP/PT 1/4 b/l, L foot very cold to touch in comparison to right foot. + 2 pitting edema present.   Derm: Ecchymosis noted on the distal tip of R hallux and dorsal-medial aspect of left foot. Mild erythema noted on dorsal aspect of R forefoot. No IDM noted. No open lesions, -PTB, - drainage, - malodor   Neuro: Protective sensation diminished  MSK: Limited ROM on R Hallux MPJ     RADIOLOGY    < from: Xray Foot AP + Lateral + Oblique, Right (09.06.23 @ 17:04) >  IMPRESSION: Findings concerning for septic arthritis/osteomyelitis   centered at the first MTP joint. Advise further evaluation/workup with   forefoot MRI. Additional chronic findings as above.      < end of copied text >          Wet read of left foot shows no acute fractures or GAS in soft tissues.                    Attending:    Patient is a 78y old  Male who presents with a chief complaint of DIABETIC FOOT ULCER     (07 Sep 2023 11:36)      HPI:  78y M w/ PMHx CAD s/p CABG, DM w/ neuropathy, CLL, HTN, adjustment disorder w/ anxious mood, schizoaffective p/w R great toe redness, erythema, pain w/ pt unable to recall when wound started. Pt seen by PCP, Dr. Brown today who inspected wound and instructed pt to present to ED. Previous issues w/ leg swelling and leg swelling/wound has decreased his ability to ambulate, can only walk 1 block now d/t leg heaviness, pain. Per pt., he had home health services but dismissed them d/t them "not doing anything". Reports chest pain associated w/ anxiety which he takes nitro for. Otherwise denies fever, SOB, abdominal pain, N/V. Normal appetite.  (06 Sep 2023 17:10)      Podiatry Addendum    Pt was evaluated bedside. Mr. Elias is a poor historian. States that he has had the erythema on his R forefoot for the past month although history is not reliable. He is unsure when the bruise on the distal tip of hallux came on. Pt notes having a hx of DM and venous insufficiency He was followed by a podiatrist in [place] however he does not recall the name. The pt states that he has a hx of a Nevarez implant on his right foot however the date of surgery is unknown currently. Pt also noticed new lesion on his L hallux and is unsure how and when he acquired it. Denies any recent trauma to the area.          XR of Left foot     Review of systems negative except per HPI and as stated below  General:	 no weakness; no fevers, no chills  Skin/Breast: no rash  Respiratory and Thorax: no SOB, no cough  Cardiovascular:	No chest pain  Gastrointestinal:	 no nausea, vomiting , diarrhea  Genitourinary:	no dysuria, no difficulty urinating, no hematuria  Musculoskeletal:	no weakness, no joint swelling/pain  Neurological:	no focal weakness/numbness  Endocrine:	no polyuria, no polydipsia    PAST MEDICAL & SURGICAL HISTORY:  Hypertension      Sleep apnea, unspecified type      Chronic leukemia in remission      DM (diabetes mellitus)  Type 2      HLD (hyperlipidemia)      S/P CABG (coronary artery bypass graft)        Home Medications:  aspirin 81 mg oral delayed release tablet: 1 tab(s) orally once a day (06 Sep 2023 14:41)  clopidogrel 75 mg oral tablet: 1 tab(s) orally once a day (06 Sep 2023 14:41)  fexofenadine 180 mg oral tablet: 1 orally (06 Sep 2023 14:41)  isosorbide mononitrate 60 mg oral tablet, extended release: 1 orally (06 Sep 2023 14:41)  latanoprost 0.005% ophthalmic solution: 1 drop(s) to each affected eye once a day (at bedtime) (06 Sep 2023 14:41)  losartan 50 mg oral tablet: 1 orally (06 Sep 2023 14:41)  meclizine 25 mg oral tablet: 1 tab(s) orally 3 times a day (06 Sep 2023 14:41)  metFORMIN 1000 mg oral tablet: 1 tab(s) orally 2 times a day (06 Sep 2023 14:41)  Metoprolol Succinate ER 50 mg oral tablet, extended release: 1 orally (06 Sep 2023 14:41)  pantoprazole 40 mg oral delayed release tablet: 1 orally (06 Sep 2023 14:41)  pregabalin 75 mg oral capsule: 1 orally (06 Sep 2023 14:41)  QUEtiapine 100 mg oral tablet: 1 orally (06 Sep 2023 14:41)  ranolazine 500 mg oral granule, extended release: 500 orally (06 Sep 2023 14:41)  rosuvastatin 10 mg oral tablet: 1 orally (06 Sep 2023 14:41)  SITagliptin 100 mg oral tablet: 1 tab(s) orally once a day (06 Sep 2023 14:41)    Allergies    No Known Allergies    Intolerances      FAMILY HISTORY:  No pertinent family history in first degree relatives      Social History:       LABS                        14.8   13.27 )-----------( 269      ( 06 Sep 2023 14:24 )             46.8     09-06    144  |  108  |  17  ----------------------------<  104<H>  4.0   |  23  |  0.83    Ca    9.5      06 Sep 2023 14:24          Vital Signs Last 24 Hrs  T(C): 36.9 (07 Sep 2023 11:20), Max: 36.9 (07 Sep 2023 11:20)  T(F): 98.4 (07 Sep 2023 11:20), Max: 98.4 (07 Sep 2023 11:20)  HR: 87 (07 Sep 2023 11:20) (78 - 87)  BP: 150/73 (07 Sep 2023 11:20) (126/69 - 155/65)  BP(mean): --  RR: 16 (07 Sep 2023 11:20) (16 - 18)  SpO2: 98% (07 Sep 2023 11:20) (97% - 100%)    Parameters below as of 07 Sep 2023 11:20  Patient On (Oxygen Delivery Method): room air        PHYSICAL EXAM  General: NAD, AA0x3    Lower Extremity Focused:  Vasc: DP/PT 1/4 b/l, L foot very cold to touch in comparison to right foot. + 2 pitting edema present.   Derm: Ecchymosis noted on the distal tip of R hallux and dorsal-medial aspect of left foot. Mild erythema noted on dorsal aspect of R forefoot. No IDM noted. No open lesions, -PTB, - drainage, - malodor   Neuro: Protective sensation diminished  MSK: Limited ROM on R Hallux MPJ     RADIOLOGY    < from: Xray Foot AP + Lateral + Oblique, Right (09.06.23 @ 17:04) >  IMPRESSION: Findings concerning for septic arthritis/osteomyelitis   centered at the first MTP joint. Advise further evaluation/workup with   forefoot MRI. Additional chronic findings as above.      < end of copied text >          Wet read of left foot shows no acute fractures or GAS in soft tissues.
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
## 2023-09-07 NOTE — BH CONSULTATION LIAISON ASSESSMENT NOTE - SUMMARY
78y SWM, domiciled alone, as per chart review PPH Schizophrenia (vs Schizoaffective D/O?), anxiety dos, AMBERLY, past SI, with prior IPP admission at [facility] in 2019, follows outpatient psychologist, PMH CAD s/p CABG, DM w/ neuropathy, CLL, HTN, p/w R great toe redness, erythema, pain surrounding chronic wound w/ concern for osteomyelitis. Psychiatry consulted for medication clarification and agitation leading to refusal of medical interventions. 78y SWM, domiciled alone, as per chart review PPH Schizophrenia vs Schizoaffective D/O, MDD, AMBERLY, dystonia, intellectual disabilities, Bipolar unspecified, past SI with no SA, prior IPP admission at [facility] in 2019, follows outpatient psychologist, PMH CAD s/p CABG, DM w/ neuropathy, CLL, HTN, p/w R great toe redness, erythema, pain surrounding chronic wound w/ concern for osteomyelitis. Psychiatry consulted for medication clarification and agitation leading to refusal of medical interventions.     On evaluation today, patient is guarded but in general cooperative, not agitated. Patient has basic understanding of his medical situation and is agreeable to current plans. Patient demonstrates misconceptions and limitation of understanding, unsure if secondary to intellectual disabilities vs. underlying personality d/o. Patient is not acutely psychotic, no acute safety concerns.    Plan:  - no indication for IPP admission at this time  - given patient's low frustration tolerance and limitation of understanding, he will benefit from simple instructions and frequent reminders of treatment plans  - as delirium precautions, recommend frequent re-orientations, delivery of care in clusters, reducing daytime interruptions to ensure adherence to circadian rhythm  - start Seroquel 200mg BID  - start Klonopin  - Psychiatry will sign off, please re-consult if questions arise   78y SWM, domiciled alone, as per chart review PPH Schizophrenia vs Schizoaffective D/O, MDD, AMBERLY, dystonia, intellectual disabilities, Bipolar unspecified, past SI with no SA, prior IPP admission at [facility] in 2019, follows outpatient psychologist, PMH CAD s/p CABG, DM w/ neuropathy, CLL, HTN, p/w R great toe redness, erythema, pain surrounding chronic wound w/ concern for osteomyelitis. Psychiatry consulted for medication clarification and agitation leading to refusal of medical interventions.     On evaluation today, patient is guarded but in general cooperative, not agitated. Patient has basic understanding of his medical situation and is agreeable to current plans. Patient demonstrates misconceptions and limitation of understanding, unsure if secondary to intellectual disabilities vs. underlying personality d/o. Patient is not acutely psychotic, no acute safety concerns.    Plan:  - no indication for IPP admission at this time  - given patient's low frustration tolerance and limitation of understanding, he will benefit from simple instructions and frequent reminders of treatment plans  - as delirium precautions, recommend frequent re-orientations, delivery of care in clusters, reducing daytime interruptions to ensure adherence to circadian rhythm  - start Seroquel 200mg BID (hold for sedation)  - start Klonopin 0.5mg daily for dystonia  - Psychiatry will sign off, please re-consult if questions arise   78y SWM, domiciled alone, as per chart review PPH Schizophrenia vs Schizoaffective D/O, MDD, AMBERLY, dystonia, intellectual disabilities, Bipolar unspecified, past SI with no SA, prior IPP admission at [facility] in 2019, follows outpatient psychologist, PMH CAD s/p CABG, DM w/ neuropathy, CLL, HTN, p/w R great toe redness, erythema, pain surrounding chronic wound w/ concern for osteomyelitis. Psychiatry consulted for medication clarification and agitation leading to refusal of medical interventions.     On evaluation today, patient is guarded but in general cooperative, not agitated. Patient has basic understanding of his medical situation and is agreeable to current plans. Patient demonstrates misconceptions and limitation of understanding, unsure if secondary to intellectual disabilities vs. underlying personality d/o. Patient is not acutely psychotic, no acute safety concerns.    Plan:  - no indication for IPP admission at this time  - given patient's low frustration tolerance and limitation of understanding, he will benefit from simple instructions and frequent reminders of treatment plans  - as delirium precautions, recommend frequent re-orientations, delivery of care in clusters, reducing daytime interruptions to ensure adherence to circadian rhythm  - start Seroquel 200mg BID (hold for sedation)  - start Klonopin 0.5mg daily for dystonia  - can use Seroquel 50mg PO bid PRN for anxiety/agitation, if refusing PO, can use Zyprexa 10mg IM QDaily  - no psychiatric contraindications to discharge  - Psychiatry will sign off, please re-consult if questions arise   78y SWM, domiciled alone, as per chart review PPH Schizophrenia vs Schizoaffective D/O, MDD, AMBERLY, dystonia, intellectual disabilities, Bipolar unspecified, past SI with no SA, prior IPP admission at [facility] in 2019, follows outpatient psychologist, PMH CAD s/p CABG, DM w/ neuropathy, CLL, HTN, p/w R great toe redness, erythema, pain surrounding chronic wound w/ concern for osteomyelitis. Psychiatry consulted for medication clarification and agitation leading to refusal of medical interventions.     On evaluation today, patient is guarded but in general cooperative, not agitated. Patient has basic understanding of his medical situation and is agreeable to current plans. Patient demonstrates misconceptions and limitation of understanding, unsure if secondary to intellectual disabilities vs. underlying personality d/o. Patient is not acutely psychotic or manic, no acute safety concerns.    Plan:  - no indication for IPP admission at this time  - given patient's low frustration tolerance and limitation of understanding, he will benefit from simple instructions and frequent reminders of treatment plans  - as delirium precautions, recommend frequent re-orientations, delivery of care in clusters, reducing daytime interruptions to ensure adherence to circadian rhythm  - start Seroquel 200mg BID (reported home medication - hold for sedation)  - start Klonopin 0.5mg daily for dystonia (reported home medication - verified on ISTOP)  - can use Seroquel 50mg PO bid PRN for anxiety/agitation, if refusing PO and acutely dangerous to self or others, can use Zyprexa 2.5mg IM Q8H PRN  - no psychiatric contraindications to discharge  - Psychiatry will sign off, please re-consult if questions arise

## 2023-09-07 NOTE — DIETITIAN INITIAL EVALUATION ADULT - OTHER CALCULATIONS
Ideal body weight used to calculate energy needs due to pt's current body weight > 120% ideal body weight. Adjusted for PU stg II   *Fluids per team

## 2023-09-07 NOTE — BH CONSULTATION LIAISON ASSESSMENT NOTE - OTHER PAST PSYCHIATRIC HISTORY (INCLUDE DETAILS REGARDING ONSET, COURSE OF ILLNESS, INPATIENT/OUTPATIENT TREATMENT)
78y SWM, domiciled alone, as per chart review PPH Schizophrenia vs Schizoaffective D/O, MDD, AMBERLY, dystonia, intellectual disabilities, Bipolar unspecified, past SI with no SA, prior IPP admission at [facility] in 2019, follows outpatient psychologist 78y SWM, domiciled alone, as per chart review PPH Schizophrenia vs Schizoaffective D/O, MDD, AMBERLY, dystonia, intellectual disabilities, Bipolar unspecified, past SI with no SA, past reported psychiatric admissions incuding to [facility] (?last 2014), follows outpatient psychologist

## 2023-09-07 NOTE — DIETITIAN INITIAL EVALUATION ADULT - PERTINENT LABORATORY DATA
09-06    144  |  108  |  17  ----------------------------<  104<H>  4.0   |  23  |  0.83    Ca    9.5      06 Sep 2023 14:24    POCT Blood Glucose.: 136 mg/dL (09-07-23 @ 08:26)

## 2023-09-07 NOTE — DIETITIAN INITIAL EVALUATION ADULT - LITERATURE/VIDEOS GIVEN
Encouraged adequate intake with focus on high protein foods in setting of PU stg II. Discussed addition of multivitamin to promote better wound healing

## 2023-09-07 NOTE — PROGRESS NOTE ADULT - PROBLEM SELECTOR PLAN 2
Chronic R toe wound w/ surrounding redness, erythema, warmth, tenderness. Afebrile, vitals WNL. HDS. Mild leukocytosis WBC 13.27. CRP 6.2, ESR 7. Podiatry initially saw pt in the ED who refused evaluation. XR R foot findings concerning for septic arthritis/OM centered at first MTP joint w/ recommendations for f/u MRI. s/p 1 dose of vanc/zosyn in ED.     -Consulted wound care   -Awaiting Podiatry recommendations   -f/u w/ MRI of R foot  -f/u repeat ESR, CRP in AM  -c/w vancomycin/zosyn for OM coverage Chronic R toe wound w/ surrounding redness, erythema, warmth, tenderness. Afebrile, vitals WNL. HDS. Mild leukocytosis WBC 13.27. CRP 6.2, ESR 7. Podiatry initially saw pt in the ED who refused evaluation. XR R foot findings concerning for septic arthritis/OM centered at first MTP joint w/ recommendations for f/u MRI. s/p 1 dose of vanc/zosyn in ED.   - c/w vanc 1500mg q12, zosyn 3.375g q8 for OM coverage   - f/u wound care   - f/u Podiatry recommendations   - f/u w/ MRI of R foot  - f/u repeat ESR, CRP in AM

## 2023-09-07 NOTE — BH CONSULTATION LIAISON ASSESSMENT NOTE - NSBHCHARTREVIEWVS_PSY_A_CORE FT
Vital Signs Last 24 Hrs  T(C): 36.4 (07 Sep 2023 05:15), Max: 36.6 (06 Sep 2023 12:34)  T(F): 97.5 (07 Sep 2023 05:15), Max: 97.9 (06 Sep 2023 12:34)  HR: 84 (07 Sep 2023 05:15) (78 - 93)  BP: 152/81 (07 Sep 2023 05:15) (111/39 - 155/65)  BP(mean): --  RR: 18 (07 Sep 2023 05:15) (18 - 18)  SpO2: 97% (07 Sep 2023 05:15) (97% - 100%)    Parameters below as of 07 Sep 2023 05:15  Patient On (Oxygen Delivery Method): room air

## 2023-09-13 DIAGNOSIS — Z95.1 PRESENCE OF AORTOCORONARY BYPASS GRAFT: ICD-10-CM

## 2023-09-13 DIAGNOSIS — Z95.5 PRESENCE OF CORONARY ANGIOPLASTY IMPLANT AND GRAFT: ICD-10-CM

## 2023-09-13 DIAGNOSIS — C95.11 CHRONIC LEUKEMIA OF UNSPECIFIED CELL TYPE, IN REMISSION: ICD-10-CM

## 2023-09-13 DIAGNOSIS — L03.031 CELLULITIS OF RIGHT TOE: ICD-10-CM

## 2023-09-13 DIAGNOSIS — F32.A DEPRESSION, UNSPECIFIED: ICD-10-CM

## 2023-09-13 DIAGNOSIS — A41.9 SEPSIS, UNSPECIFIED ORGANISM: ICD-10-CM

## 2023-09-13 DIAGNOSIS — L97.519 NON-PRESSURE CHRONIC ULCER OF OTHER PART OF RIGHT FOOT WITH UNSPECIFIED SEVERITY: ICD-10-CM

## 2023-09-13 DIAGNOSIS — F25.9 SCHIZOAFFECTIVE DISORDER, UNSPECIFIED: ICD-10-CM

## 2023-09-13 DIAGNOSIS — F41.9 ANXIETY DISORDER, UNSPECIFIED: ICD-10-CM

## 2023-09-13 DIAGNOSIS — I10 ESSENTIAL (PRIMARY) HYPERTENSION: ICD-10-CM

## 2023-09-13 DIAGNOSIS — I25.10 ATHEROSCLEROTIC HEART DISEASE OF NATIVE CORONARY ARTERY WITHOUT ANGINA PECTORIS: ICD-10-CM

## 2023-09-13 DIAGNOSIS — E11.621 TYPE 2 DIABETES MELLITUS WITH FOOT ULCER: ICD-10-CM

## 2023-09-13 DIAGNOSIS — E11.9 TYPE 2 DIABETES MELLITUS WITHOUT COMPLICATIONS: ICD-10-CM

## 2023-11-27 NOTE — ED BEHAVIORAL HEALTH ASSESSMENT NOTE - AXIS IV
FAMILY HISTORY:  Father  Still living? Unknown  FH: heart disease, Age at diagnosis: Age Unknown     Problems with access to healthcare services/Problems with primary support/Problem related to social environment

## 2024-01-15 NOTE — ED BEHAVIORAL HEALTH ASSESSMENT NOTE - NS ED BHA MED ROS CARDIOVASCULAR
Left Voicemail (2nd Attempt) for the patient to call back and schedule the following:    Appointment type: follow up   Provider: Dr. Oliva  Return date: next available  Specialty phone number: [phone]  Additional appointment(s) needed:   Additonal Notes:     
No complaints

## 2024-03-22 ENCOUNTER — EMERGENCY (EMERGENCY)
Facility: HOSPITAL | Age: 79
LOS: 1 days | Discharge: LEFT BEFORE DISCHARGE | End: 2024-03-22
Attending: EMERGENCY MEDICINE | Admitting: EMERGENCY MEDICINE
Payer: MEDICARE

## 2024-03-22 VITALS
SYSTOLIC BLOOD PRESSURE: 138 MMHG | RESPIRATION RATE: 18 BRPM | HEIGHT: 71 IN | DIASTOLIC BLOOD PRESSURE: 70 MMHG | HEART RATE: 115 BPM | OXYGEN SATURATION: 98 % | TEMPERATURE: 98 F | WEIGHT: 197.98 LBS

## 2024-03-22 DIAGNOSIS — Z95.1 PRESENCE OF AORTOCORONARY BYPASS GRAFT: Chronic | ICD-10-CM

## 2024-03-22 PROCEDURE — 82962 GLUCOSE BLOOD TEST: CPT

## 2024-03-22 PROCEDURE — 93005 ELECTROCARDIOGRAM TRACING: CPT

## 2024-03-22 PROCEDURE — 99284 EMERGENCY DEPT VISIT MOD MDM: CPT

## 2024-03-22 NOTE — ED ADULT TRIAGE NOTE - CHIEF COMPLAINT QUOTE
Pt c/o worsening depression, anxiety, decreased PO intake x days. Pt requesting admission to [facility], "I think it is the only way I will fix my problems". Denies SI/HI, AH/VH, alcohol/drug use.

## 2024-03-22 NOTE — ED ADULT NURSE NOTE - NSFALLHARMRISKINTERV_ED_ALL_ED

## 2024-03-22 NOTE — ED PROVIDER NOTE - OBJECTIVE STATEMENT
Attempted to speak to pt while tech getting pt undressed for EKG. He reports he is not going to speak to me while he is getting tests done. I will return after EKG and blood work performed.

## 2024-03-22 NOTE — ED ADULT NURSE NOTE - NS ED NURSE ELOPE COMMENTS
Pt stated "they are not going to admit me to the hospital I am leaving." Pt ambulated out of ED w/ steady gait. Charge RN made aware, MD Palma notified.

## 2024-03-22 NOTE — ED ADULT NURSE REASSESSMENT NOTE - NS ED NURSE REASSESS COMMENT FT1
Pt having EKG done, stated to MD "I am not talking to you while I have a procedure done." RN attempted to collect blood work as per MD orders, pt stated "they are not going to admit me, I am going home." Pt educated on importance of blood work and staying in ED.

## 2024-03-22 NOTE — ED ADULT NURSE NOTE - OBJECTIVE STATEMENT
78M PMH HTN, HLD, DM, chronic leukemia, GERD and depression/anxiety/pain attacks presents to ED with multiple medical complaints. "I don't want to die while I am awake just when I go to sleep." pt states he needs to be admitted because of his depression/anxiety and also that he has to have an endoscopy here in the hospital because he has no family. pt also reports that he has not eaten in 5 days due to his psychiatric history. pt provided with sandwich and juice. pt with pressured speech, fair eye contact and appears to have poor grooming. pt denies SI/HI, AV/HV, drugs/ETOH, CP/SOB, n/v/d, abd pain or fever/chills. pt reports feeling unsafe at home, "its the people in my building that is part of the problem." 78M PMH HTN, HLD, DM, chronic leukemia, GERD and depression/anxiety/pain attacks presents to ED with multiple medical complaints. "I don't want to die while I am awake. When I die one day I want it to be in my sleep." pt states he needs to be admitted because of his depression/anxiety and also that he has to have an endoscopy here in the hospital because he has no family. pt also reports that he has not eaten in 5 days due to his psychiatric history. pt provided with sandwich and juice. pt with pressured speech, fair eye contact and appears to have poor grooming. pt denies SI/HI, AV/HV, drugs/ETOH, CP/SOB, n/v/d, abd pain or fever/chills. pt reports feeling unsafe at home, "its the people in my building that is part of the problem." pt states he feels safe here in the hospital

## 2024-03-22 NOTE — ED PROVIDER NOTE - PROGRESS NOTE DETAILS
While RN attempting to draw blood work pt reports "they are not going to admit me" and refuses blood work and leaves the ED. Pt did not endorse suicidality or psychotic features on arrival

## 2024-03-25 DIAGNOSIS — Z53.21 PROCEDURE AND TREATMENT NOT CARRIED OUT DUE TO PATIENT LEAVING PRIOR TO BEING SEEN BY HEALTH CARE PROVIDER: ICD-10-CM

## 2024-03-25 DIAGNOSIS — F32.A DEPRESSION, UNSPECIFIED: ICD-10-CM

## 2024-04-12 NOTE — ED BEHAVIORAL HEALTH ASSESSMENT NOTE - NS ED BHA PLAN ADMIT TO [facility] REFERRANT CONTACTED YN
[facility] received a fax that requires your attention. The document has been indexed to the patient’s chart for your review.      Reason for sending:  CT ABD/PELVIS W/WO 04/05/24    Documents Description:  CT ABD/PELVIS W/WO_ [facility]_04/05/24    Name of Sender:  [facility]    Date Indexed: 04/12/24    Notes (if needed): CT ABD/PELVIS W/WO 04/05/24 RESULTS     Yes

## 2024-05-29 NOTE — ED PROVIDER NOTE - HIV OFFER
Make a \"SMART\" plan:  Specific  Measurable  Attainable  Realistic  Time Limited  Patient Education        Quitting Tobacco: Care Instructions  Quitting tobacco is much harder than simply changing a habit. Nicotine cravings make it hard to quit, but you can do it. Your doctor will help you set up the plan that best meets your needs.    You will miss the nicotine at first. You may feel short-tempered and grumpy. You may have trouble sleeping or thinking clearly. The urge to use tobacco may continue for a time.    Combining tools can raise your chances of success. You can use medicine along with counseling. And you can join a quit-tobacco program, such as the American Lung Association's Freedom from Smoking program.    Get support.  Reach out to family and friends, and find a counselor to help you quit. Join a support group, such as Nicotine Anonymous. Go to www.smokefree.gov to sign up for text messaging support.     Talk to your doctor or pharmacist about medicines that can help you quit.  Medicines can help with cravings and withdrawal symptoms. There are several over-the-counter choices, such as nicotine patches, gum, and lozenges.     After you quit, do not use tobacco again, not even once.  Get rid of all tobacco products and anything that reminds you of tobacco, such as ashtrays.     Avoid things that make you reach for tobacco.  Change your daily routine. Take a different route to work, or eat a meal in a different place.     Try to cut down on stress.  Find ways to calm yourself, such as taking a hot bath or doing deep breathing exercises.     Eat a healthy diet, and get regular exercise.  Having healthy habits may help you quit using tobacco.     Don't give up on quitting if you use tobacco again.  Most people quit and restart a few times before they quit for good.   Follow-up care is a key part of your treatment and safety. Be sure to make and go to all appointments, and call your doctor if you are having 
Opt out

## 2024-09-26 NOTE — ED PROVIDER NOTE - ATTENDING CONTRIBUTION TO CARE
Rx sent [facility] instead of [facility] at mother's request.  Left message for [facility] informing them not to fill Rx.   I performed a face to face evaluation of this patient and obtained a history and performed a full exam.  I agree with the history, physical exam and plan of the NP.    74 yr old m, pmh, schizoaffective, DM2, HTN, HDL, Cabg x 2 with c/o increased agitation, depression and unable to take care of himself. 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 Today he went for tx where he became very anxious, agitated, he was medicated there with Ativan 1mg IV, and after that he was sent in for further evaluation. PT states he is not able to take care of himself anymore. Currently living in senior living home, requesting admission for placement in nursing home.  He denies abuse, but feels he is not safe in his currently living situation. Denies falling, punching or kicking any objects. Denies pain, SOB, fever, chills, chest and abdominal discomfort. Denies recent use of alcohol or illicit drug. No evidence of physical injuries.  VSS AF.  Exam with pressured speech, non-toxic appearing, neck supple, non-diaphoretic, lungs clear, heart sounds nl, no rash, no focal neuro deficit.  No apparent infectious etiology, no c/f meningitis.  Does not appear acutely psychotic at this time.  Will send labs.  Have patient seen by .  Dispo pending.

## 2024-11-04 ENCOUNTER — APPOINTMENT (OUTPATIENT)
Dept: HEART AND VASCULAR | Facility: CLINIC | Age: 79
End: 2024-11-04